# Patient Record
Sex: FEMALE | Race: WHITE | NOT HISPANIC OR LATINO | URBAN - METROPOLITAN AREA
[De-identification: names, ages, dates, MRNs, and addresses within clinical notes are randomized per-mention and may not be internally consistent; named-entity substitution may affect disease eponyms.]

---

## 2023-03-21 ENCOUNTER — INPATIENT (INPATIENT)
Facility: HOSPITAL | Age: 88
LOS: 3 days | Discharge: HOME | DRG: 871 | End: 2023-03-25
Attending: HOSPITALIST | Admitting: HOSPITALIST
Payer: MEDICARE

## 2023-03-21 VITALS
OXYGEN SATURATION: 97 % | HEART RATE: 100 BPM | TEMPERATURE: 96 F | DIASTOLIC BLOOD PRESSURE: 52 MMHG | RESPIRATION RATE: 20 BRPM | SYSTOLIC BLOOD PRESSURE: 91 MMHG

## 2023-03-21 DIAGNOSIS — J18.9 PNEUMONIA, UNSPECIFIED ORGANISM: ICD-10-CM

## 2023-03-21 LAB
ALBUMIN SERPL ELPH-MCNC: 2.7 G/DL — LOW (ref 3.5–5.2)
ALP SERPL-CCNC: 116 U/L — HIGH (ref 30–115)
ALT FLD-CCNC: <5 U/L — SIGNIFICANT CHANGE UP (ref 0–41)
ANION GAP SERPL CALC-SCNC: 15 MMOL/L — HIGH (ref 7–14)
AST SERPL-CCNC: 10 U/L — SIGNIFICANT CHANGE UP (ref 0–41)
BASOPHILS # BLD AUTO: 0.03 K/UL — SIGNIFICANT CHANGE UP (ref 0–0.2)
BASOPHILS NFR BLD AUTO: 0.2 % — SIGNIFICANT CHANGE UP (ref 0–1)
BILIRUB SERPL-MCNC: 0.2 MG/DL — SIGNIFICANT CHANGE UP (ref 0.2–1.2)
BUN SERPL-MCNC: 68 MG/DL — CRITICAL HIGH (ref 10–20)
CALCIUM SERPL-MCNC: 9.3 MG/DL — SIGNIFICANT CHANGE UP (ref 8.4–10.4)
CHLORIDE SERPL-SCNC: 89 MMOL/L — LOW (ref 98–110)
CO2 SERPL-SCNC: 21 MMOL/L — SIGNIFICANT CHANGE UP (ref 17–32)
CREAT SERPL-MCNC: 2.7 MG/DL — HIGH (ref 0.7–1.5)
EGFR: 16 ML/MIN/1.73M2 — LOW
EOSINOPHIL # BLD AUTO: 0.11 K/UL — SIGNIFICANT CHANGE UP (ref 0–0.7)
EOSINOPHIL NFR BLD AUTO: 0.8 % — SIGNIFICANT CHANGE UP (ref 0–8)
GLUCOSE SERPL-MCNC: 418 MG/DL — HIGH (ref 70–99)
HCT VFR BLD CALC: 31.5 % — LOW (ref 37–47)
HGB BLD-MCNC: 10.4 G/DL — LOW (ref 12–16)
IMM GRANULOCYTES NFR BLD AUTO: 1.1 % — HIGH (ref 0.1–0.3)
LACTATE SERPL-SCNC: 2.4 MMOL/L — HIGH (ref 0.7–2)
LIDOCAIN IGE QN: 17 U/L — SIGNIFICANT CHANGE UP (ref 7–60)
LYMPHOCYTES # BLD AUTO: 1.62 K/UL — SIGNIFICANT CHANGE UP (ref 1.2–3.4)
LYMPHOCYTES # BLD AUTO: 11.6 % — LOW (ref 20.5–51.1)
MCHC RBC-ENTMCNC: 29.3 PG — SIGNIFICANT CHANGE UP (ref 27–31)
MCHC RBC-ENTMCNC: 33 G/DL — SIGNIFICANT CHANGE UP (ref 32–37)
MCV RBC AUTO: 88.7 FL — SIGNIFICANT CHANGE UP (ref 81–99)
MONOCYTES # BLD AUTO: 0.56 K/UL — SIGNIFICANT CHANGE UP (ref 0.1–0.6)
MONOCYTES NFR BLD AUTO: 4 % — SIGNIFICANT CHANGE UP (ref 1.7–9.3)
NEUTROPHILS # BLD AUTO: 11.5 K/UL — HIGH (ref 1.4–6.5)
NEUTROPHILS NFR BLD AUTO: 82.3 % — HIGH (ref 42.2–75.2)
NRBC # BLD: 0 /100 WBCS — SIGNIFICANT CHANGE UP (ref 0–0)
PLATELET # BLD AUTO: 368 K/UL — SIGNIFICANT CHANGE UP (ref 130–400)
POTASSIUM SERPL-MCNC: 4.4 MMOL/L — SIGNIFICANT CHANGE UP (ref 3.5–5)
POTASSIUM SERPL-SCNC: 4.4 MMOL/L — SIGNIFICANT CHANGE UP (ref 3.5–5)
PROT SERPL-MCNC: 6.5 G/DL — SIGNIFICANT CHANGE UP (ref 6–8)
RBC # BLD: 3.55 M/UL — LOW (ref 4.2–5.4)
RBC # FLD: 15.9 % — HIGH (ref 11.5–14.5)
SODIUM SERPL-SCNC: 125 MMOL/L — LOW (ref 135–146)
TROPONIN T SERPL-MCNC: 0.04 NG/ML — CRITICAL HIGH
WBC # BLD: 13.97 K/UL — HIGH (ref 4.8–10.8)
WBC # FLD AUTO: 13.97 K/UL — HIGH (ref 4.8–10.8)

## 2023-03-21 PROCEDURE — 71045 X-RAY EXAM CHEST 1 VIEW: CPT | Mod: 26

## 2023-03-21 PROCEDURE — 87493 C DIFF AMPLIFIED PROBE: CPT

## 2023-03-21 PROCEDURE — 82962 GLUCOSE BLOOD TEST: CPT

## 2023-03-21 PROCEDURE — 83036 HEMOGLOBIN GLYCOSYLATED A1C: CPT

## 2023-03-21 PROCEDURE — 84484 ASSAY OF TROPONIN QUANT: CPT

## 2023-03-21 PROCEDURE — 99285 EMERGENCY DEPT VISIT HI MDM: CPT

## 2023-03-21 PROCEDURE — 97162 PT EVAL MOD COMPLEX 30 MIN: CPT | Mod: GP

## 2023-03-21 PROCEDURE — 93970 EXTREMITY STUDY: CPT

## 2023-03-21 PROCEDURE — 83550 IRON BINDING TEST: CPT

## 2023-03-21 PROCEDURE — 92610 EVALUATE SWALLOWING FUNCTION: CPT | Mod: GN

## 2023-03-21 PROCEDURE — 80048 BASIC METABOLIC PNL TOTAL CA: CPT

## 2023-03-21 PROCEDURE — 87040 BLOOD CULTURE FOR BACTERIA: CPT

## 2023-03-21 PROCEDURE — U0003: CPT

## 2023-03-21 PROCEDURE — 93010 ELECTROCARDIOGRAM REPORT: CPT

## 2023-03-21 PROCEDURE — 85027 COMPLETE CBC AUTOMATED: CPT

## 2023-03-21 PROCEDURE — 0225U NFCT DS DNA&RNA 21 SARSCOV2: CPT

## 2023-03-21 PROCEDURE — 84145 PROCALCITONIN (PCT): CPT

## 2023-03-21 PROCEDURE — 82607 VITAMIN B-12: CPT

## 2023-03-21 PROCEDURE — 83930 ASSAY OF BLOOD OSMOLALITY: CPT

## 2023-03-21 PROCEDURE — 83605 ASSAY OF LACTIC ACID: CPT

## 2023-03-21 PROCEDURE — 82728 ASSAY OF FERRITIN: CPT

## 2023-03-21 PROCEDURE — 81003 URINALYSIS AUTO W/O SCOPE: CPT

## 2023-03-21 PROCEDURE — 80053 COMPREHEN METABOLIC PANEL: CPT

## 2023-03-21 PROCEDURE — 87899 AGENT NOS ASSAY W/OPTIC: CPT

## 2023-03-21 PROCEDURE — 84540 ASSAY OF URINE/UREA-N: CPT

## 2023-03-21 PROCEDURE — 85025 COMPLETE CBC W/AUTO DIFF WBC: CPT

## 2023-03-21 PROCEDURE — 87507 IADNA-DNA/RNA PROBE TQ 12-25: CPT

## 2023-03-21 PROCEDURE — 87338 HPYLORI STOOL AG IA: CPT

## 2023-03-21 PROCEDURE — 76770 US EXAM ABDO BACK WALL COMP: CPT

## 2023-03-21 PROCEDURE — 84300 ASSAY OF URINE SODIUM: CPT

## 2023-03-21 PROCEDURE — 71045 X-RAY EXAM CHEST 1 VIEW: CPT

## 2023-03-21 PROCEDURE — 87449 NOS EACH ORGANISM AG IA: CPT

## 2023-03-21 PROCEDURE — 74176 CT ABD & PELVIS W/O CONTRAST: CPT | Mod: 26,MA

## 2023-03-21 PROCEDURE — 83935 ASSAY OF URINE OSMOLALITY: CPT

## 2023-03-21 PROCEDURE — 83735 ASSAY OF MAGNESIUM: CPT

## 2023-03-21 PROCEDURE — 83540 ASSAY OF IRON: CPT

## 2023-03-21 PROCEDURE — U0005: CPT

## 2023-03-21 PROCEDURE — 82570 ASSAY OF URINE CREATININE: CPT

## 2023-03-21 PROCEDURE — 84443 ASSAY THYROID STIM HORMONE: CPT

## 2023-03-21 PROCEDURE — 84156 ASSAY OF PROTEIN URINE: CPT

## 2023-03-21 PROCEDURE — 82010 KETONE BODYS QUAN: CPT

## 2023-03-21 PROCEDURE — 93005 ELECTROCARDIOGRAM TRACING: CPT

## 2023-03-21 PROCEDURE — 36415 COLL VENOUS BLD VENIPUNCTURE: CPT

## 2023-03-21 PROCEDURE — 94640 AIRWAY INHALATION TREATMENT: CPT

## 2023-03-21 PROCEDURE — 82746 ASSAY OF FOLIC ACID SERUM: CPT

## 2023-03-21 RX ORDER — FAMOTIDINE 10 MG/ML
20 INJECTION INTRAVENOUS ONCE
Refills: 0 | Status: COMPLETED | OUTPATIENT
Start: 2023-03-21 | End: 2023-03-21

## 2023-03-21 RX ORDER — DIPHENHYDRAMINE HYDROCHLORIDE AND LIDOCAINE HYDROCHLORIDE AND ALUMINUM HYDROXIDE AND MAGNESIUM HYDRO
30 KIT ONCE
Refills: 0 | Status: COMPLETED | OUTPATIENT
Start: 2023-03-21 | End: 2023-03-21

## 2023-03-21 RX ORDER — AZTREONAM 2 G
2000 VIAL (EA) INJECTION ONCE
Refills: 0 | Status: DISCONTINUED | OUTPATIENT
Start: 2023-03-21 | End: 2023-03-22

## 2023-03-21 RX ORDER — SODIUM CHLORIDE 9 MG/ML
1000 INJECTION INTRAMUSCULAR; INTRAVENOUS; SUBCUTANEOUS ONCE
Refills: 0 | Status: COMPLETED | OUTPATIENT
Start: 2023-03-21 | End: 2023-03-21

## 2023-03-21 RX ADMIN — FAMOTIDINE 20 MILLIGRAM(S): 10 INJECTION INTRAVENOUS at 19:45

## 2023-03-21 RX ADMIN — SODIUM CHLORIDE 1000 MILLILITER(S): 9 INJECTION INTRAMUSCULAR; INTRAVENOUS; SUBCUTANEOUS at 22:30

## 2023-03-21 NOTE — ED PROVIDER NOTE - CONSIDERATION OF ADMISSION OBSERVATION
Elderly frail female with elevated white cell count, findings of bilateral pneumonia, admit Consideration of Admission/Observation

## 2023-03-21 NOTE — ED PROVIDER NOTE - CLINICAL SUMMARY MEDICAL DECISION MAKING FREE TEXT BOX
89-year-old female with epigastric pain.  Labs ordered and reviewed, patient was found to have elevated white cell count as well as impaired renal function.  Troponin was slightly elevated, likely function of elevated creatinine.  EKG ordered and appears nonischemic.  Imaging including chest x-ray and CT abdomen pelvis were ordered, patient was found to have bilateral basilar infiltrates, antibiotics ordered, patient admitted for further management.

## 2023-03-21 NOTE — ED PROVIDER NOTE - OBJECTIVE STATEMENT
89 year old female with pmhx of ckd, dm, and peptic ulcers, presents to ed with epigastric abdominal pain. Pain is sharp, radiates upward. intermittent. pt denies sob, cough, fever, chills,  v/d, urinary symptoms, chest pain.

## 2023-03-21 NOTE — ED ADULT TRIAGE NOTE - CHIEF COMPLAINT QUOTE
BIBA from home, pt c/o epigastric abdominal pain, as per EMS, pt has been recently diagnosed with an ulcer. pt states the pain is worse after eating and she "burps a lot"

## 2023-03-21 NOTE — ED PROVIDER NOTE - NS ED ATTENDING STATEMENT MOD
This was a shared visit with the OSIEL. I reviewed and verified the documentation and independently performed the documented:

## 2023-03-21 NOTE — ED PROVIDER NOTE - ATTENDING APP SHARED VISIT CONTRIBUTION OF CARE
89-year-old female recently diagnosed with stomach ulcer presenting for evaluation of epigastric burning with eating.  Patient is a poor historian and very hard of hearing, most history obtained by from her daughter.  No vomiting, no chest pain, no shortness of breath, no leg pain or swelling.  Elderly female appears frail but in no acute distress, speaking full sentences, bilateral basilar inspiratory crackles, RRR, well-perfused extremities, abdomen is soft with mild epigastric tenderness without rebound or guarding, no leg edema, moving all extremities, awake and alert, follows directions appropriately.  Will treat pain, obtain labs, imaging, reassess.

## 2023-03-22 LAB
A1C WITH ESTIMATED AVERAGE GLUCOSE RESULT: 7.4 % — HIGH (ref 4–5.6)
ALBUMIN SERPL ELPH-MCNC: 2.7 G/DL — LOW (ref 3.5–5.2)
ALP SERPL-CCNC: 121 U/L — HIGH (ref 30–115)
ALT FLD-CCNC: <5 U/L — SIGNIFICANT CHANGE UP (ref 0–41)
ANION GAP SERPL CALC-SCNC: 13 MMOL/L — SIGNIFICANT CHANGE UP (ref 7–14)
ANION GAP SERPL CALC-SCNC: 16 MMOL/L — HIGH (ref 7–14)
AST SERPL-CCNC: 9 U/L — SIGNIFICANT CHANGE UP (ref 0–41)
B-OH-BUTYR SERPL-SCNC: <0.2 MMOL/L — SIGNIFICANT CHANGE UP
BASOPHILS # BLD AUTO: 0.05 K/UL — SIGNIFICANT CHANGE UP (ref 0–0.2)
BASOPHILS NFR BLD AUTO: 0.4 % — SIGNIFICANT CHANGE UP (ref 0–1)
BILIRUB SERPL-MCNC: <0.2 MG/DL — SIGNIFICANT CHANGE UP (ref 0.2–1.2)
BUN SERPL-MCNC: 64 MG/DL — CRITICAL HIGH (ref 10–20)
BUN SERPL-MCNC: 64 MG/DL — CRITICAL HIGH (ref 10–20)
CALCIUM SERPL-MCNC: 8.8 MG/DL — SIGNIFICANT CHANGE UP (ref 8.4–10.5)
CALCIUM SERPL-MCNC: 9.2 MG/DL — SIGNIFICANT CHANGE UP (ref 8.4–10.4)
CHLORIDE SERPL-SCNC: 96 MMOL/L — LOW (ref 98–110)
CHLORIDE SERPL-SCNC: 96 MMOL/L — LOW (ref 98–110)
CO2 SERPL-SCNC: 21 MMOL/L — SIGNIFICANT CHANGE UP (ref 17–32)
CO2 SERPL-SCNC: 23 MMOL/L — SIGNIFICANT CHANGE UP (ref 17–32)
CREAT SERPL-MCNC: 2.5 MG/DL — HIGH (ref 0.7–1.5)
CREAT SERPL-MCNC: 2.7 MG/DL — HIGH (ref 0.7–1.5)
EGFR: 16 ML/MIN/1.73M2 — LOW
EGFR: 18 ML/MIN/1.73M2 — LOW
EOSINOPHIL # BLD AUTO: 0.54 K/UL — SIGNIFICANT CHANGE UP (ref 0–0.7)
EOSINOPHIL NFR BLD AUTO: 4.3 % — SIGNIFICANT CHANGE UP (ref 0–8)
ESTIMATED AVERAGE GLUCOSE: 166 MG/DL — HIGH (ref 68–114)
FERRITIN SERPL-MCNC: 520 NG/ML — HIGH (ref 15–150)
FOLATE SERPL-MCNC: 4.7 NG/ML — SIGNIFICANT CHANGE UP
GLUCOSE BLDC GLUCOMTR-MCNC: 110 MG/DL — HIGH (ref 70–99)
GLUCOSE BLDC GLUCOMTR-MCNC: 144 MG/DL — HIGH (ref 70–99)
GLUCOSE BLDC GLUCOMTR-MCNC: 196 MG/DL — HIGH (ref 70–99)
GLUCOSE BLDC GLUCOMTR-MCNC: 291 MG/DL — HIGH (ref 70–99)
GLUCOSE BLDC GLUCOMTR-MCNC: 88 MG/DL — SIGNIFICANT CHANGE UP (ref 70–99)
GLUCOSE SERPL-MCNC: 157 MG/DL — HIGH (ref 70–99)
GLUCOSE SERPL-MCNC: 56 MG/DL — LOW (ref 70–99)
HCT VFR BLD CALC: 29.6 % — LOW (ref 37–47)
HGB BLD-MCNC: 9.4 G/DL — LOW (ref 12–16)
IMM GRANULOCYTES NFR BLD AUTO: 1.2 % — HIGH (ref 0.1–0.3)
IRON SATN MFR SERPL: 21 % — SIGNIFICANT CHANGE UP (ref 15–50)
IRON SATN MFR SERPL: 31 UG/DL — LOW (ref 35–150)
LACTATE SERPL-SCNC: 1.5 MMOL/L — SIGNIFICANT CHANGE UP (ref 0.7–2)
LACTATE SERPL-SCNC: 1.6 MMOL/L — SIGNIFICANT CHANGE UP (ref 0.7–2)
LACTATE SERPL-SCNC: 1.8 MMOL/L — SIGNIFICANT CHANGE UP (ref 0.7–2)
LACTATE SERPL-SCNC: 2.2 MMOL/L — HIGH (ref 0.7–2)
LYMPHOCYTES # BLD AUTO: 16.2 % — LOW (ref 20.5–51.1)
LYMPHOCYTES # BLD AUTO: 2.06 K/UL — SIGNIFICANT CHANGE UP (ref 1.2–3.4)
MAGNESIUM SERPL-MCNC: 1.4 MG/DL — LOW (ref 1.8–2.4)
MCHC RBC-ENTMCNC: 28.4 PG — SIGNIFICANT CHANGE UP (ref 27–31)
MCHC RBC-ENTMCNC: 31.8 G/DL — LOW (ref 32–37)
MCV RBC AUTO: 89.4 FL — SIGNIFICANT CHANGE UP (ref 81–99)
MONOCYTES # BLD AUTO: 0.62 K/UL — HIGH (ref 0.1–0.6)
MONOCYTES NFR BLD AUTO: 4.9 % — SIGNIFICANT CHANGE UP (ref 1.7–9.3)
NEUTROPHILS # BLD AUTO: 9.26 K/UL — HIGH (ref 1.4–6.5)
NEUTROPHILS NFR BLD AUTO: 73 % — SIGNIFICANT CHANGE UP (ref 42.2–75.2)
NRBC # BLD: 0 /100 WBCS — SIGNIFICANT CHANGE UP (ref 0–0)
OSMOLALITY SERPL: 300 MOS/KG — SIGNIFICANT CHANGE UP (ref 280–301)
PLATELET # BLD AUTO: 394 K/UL — SIGNIFICANT CHANGE UP (ref 130–400)
POTASSIUM SERPL-MCNC: 3.9 MMOL/L — SIGNIFICANT CHANGE UP (ref 3.5–5)
POTASSIUM SERPL-MCNC: 4 MMOL/L — SIGNIFICANT CHANGE UP (ref 3.5–5)
POTASSIUM SERPL-SCNC: 3.9 MMOL/L — SIGNIFICANT CHANGE UP (ref 3.5–5)
POTASSIUM SERPL-SCNC: 4 MMOL/L — SIGNIFICANT CHANGE UP (ref 3.5–5)
PROCALCITONIN SERPL-MCNC: 0.25 NG/ML — HIGH (ref 0.02–0.1)
PROT SERPL-MCNC: 6.2 G/DL — SIGNIFICANT CHANGE UP (ref 6–8)
RBC # BLD: 3.31 M/UL — LOW (ref 4.2–5.4)
RBC # FLD: 15.9 % — HIGH (ref 11.5–14.5)
SARS-COV-2 RNA SPEC QL NAA+PROBE: SIGNIFICANT CHANGE UP
SODIUM SERPL-SCNC: 132 MMOL/L — LOW (ref 135–146)
SODIUM SERPL-SCNC: 133 MMOL/L — LOW (ref 135–146)
TIBC SERPL-MCNC: 145 UG/DL — LOW (ref 220–430)
TROPONIN T SERPL-MCNC: 0.03 NG/ML — CRITICAL HIGH
TROPONIN T SERPL-MCNC: 0.04 NG/ML — CRITICAL HIGH
TSH SERPL-MCNC: 1.23 UIU/ML — SIGNIFICANT CHANGE UP (ref 0.27–4.2)
UIBC SERPL-MCNC: 114 UG/DL — SIGNIFICANT CHANGE UP (ref 110–370)
VIT B12 SERPL-MCNC: 1216 PG/ML — SIGNIFICANT CHANGE UP (ref 232–1245)
WBC # BLD: 12.68 K/UL — HIGH (ref 4.8–10.8)
WBC # FLD AUTO: 12.68 K/UL — HIGH (ref 4.8–10.8)

## 2023-03-22 PROCEDURE — 76770 US EXAM ABDO BACK WALL COMP: CPT | Mod: 26

## 2023-03-22 PROCEDURE — 99223 1ST HOSP IP/OBS HIGH 75: CPT

## 2023-03-22 RX ORDER — DEXTROSE 50 % IN WATER 50 %
25 SYRINGE (ML) INTRAVENOUS ONCE
Refills: 0 | Status: DISCONTINUED | OUTPATIENT
Start: 2023-03-22 | End: 2023-03-24

## 2023-03-22 RX ORDER — ATORVASTATIN CALCIUM 80 MG/1
20 TABLET, FILM COATED ORAL AT BEDTIME
Refills: 0 | Status: DISCONTINUED | OUTPATIENT
Start: 2023-03-22 | End: 2023-03-25

## 2023-03-22 RX ORDER — SODIUM CHLORIDE 9 MG/ML
1000 INJECTION, SOLUTION INTRAVENOUS
Refills: 0 | Status: DISCONTINUED | OUTPATIENT
Start: 2023-03-22 | End: 2023-03-24

## 2023-03-22 RX ORDER — LACTOBACILLUS ACIDOPHILUS 100MM CELL
1 CAPSULE ORAL DAILY
Refills: 0 | Status: DISCONTINUED | OUTPATIENT
Start: 2023-03-22 | End: 2023-03-25

## 2023-03-22 RX ORDER — SODIUM CHLORIDE 9 MG/ML
500 INJECTION, SOLUTION INTRAVENOUS
Refills: 0 | Status: COMPLETED | OUTPATIENT
Start: 2023-03-22 | End: 2023-03-22

## 2023-03-22 RX ORDER — PANTOPRAZOLE SODIUM 20 MG/1
40 TABLET, DELAYED RELEASE ORAL
Refills: 0 | Status: DISCONTINUED | OUTPATIENT
Start: 2023-03-22 | End: 2023-03-25

## 2023-03-22 RX ORDER — SUCRALFATE 1 G
1 TABLET ORAL EVERY 6 HOURS
Refills: 0 | Status: DISCONTINUED | OUTPATIENT
Start: 2023-03-22 | End: 2023-03-25

## 2023-03-22 RX ORDER — INSULIN GLARGINE 100 [IU]/ML
8 INJECTION, SOLUTION SUBCUTANEOUS ONCE
Refills: 0 | Status: COMPLETED | OUTPATIENT
Start: 2023-03-22 | End: 2023-03-22

## 2023-03-22 RX ORDER — SODIUM CHLORIDE 9 MG/ML
250 INJECTION, SOLUTION INTRAVENOUS ONCE
Refills: 0 | Status: COMPLETED | OUTPATIENT
Start: 2023-03-22 | End: 2023-03-22

## 2023-03-22 RX ORDER — METRONIDAZOLE 500 MG
500 TABLET ORAL EVERY 8 HOURS
Refills: 0 | Status: DISCONTINUED | OUTPATIENT
Start: 2023-03-22 | End: 2023-03-23

## 2023-03-22 RX ORDER — GLUCAGON INJECTION, SOLUTION 0.5 MG/.1ML
1 INJECTION, SOLUTION SUBCUTANEOUS ONCE
Refills: 0 | Status: DISCONTINUED | OUTPATIENT
Start: 2023-03-22 | End: 2023-03-24

## 2023-03-22 RX ORDER — INFLUENZA VIRUS VACCINE 15; 15; 15; 15 UG/.5ML; UG/.5ML; UG/.5ML; UG/.5ML
0.7 SUSPENSION INTRAMUSCULAR ONCE
Refills: 0 | Status: DISCONTINUED | OUTPATIENT
Start: 2023-03-22 | End: 2023-03-25

## 2023-03-22 RX ORDER — POLYETHYLENE GLYCOL 3350 17 G/17G
17 POWDER, FOR SOLUTION ORAL DAILY
Refills: 0 | Status: DISCONTINUED | OUTPATIENT
Start: 2023-03-22 | End: 2023-03-25

## 2023-03-22 RX ORDER — DEXTROSE 50 % IN WATER 50 %
15 SYRINGE (ML) INTRAVENOUS ONCE
Refills: 0 | Status: DISCONTINUED | OUTPATIENT
Start: 2023-03-22 | End: 2023-03-24

## 2023-03-22 RX ORDER — SODIUM CHLORIDE 9 MG/ML
1000 INJECTION INTRAMUSCULAR; INTRAVENOUS; SUBCUTANEOUS
Refills: 0 | Status: DISCONTINUED | OUTPATIENT
Start: 2023-03-22 | End: 2023-03-23

## 2023-03-22 RX ORDER — SODIUM CHLORIDE 9 MG/ML
1000 INJECTION INTRAMUSCULAR; INTRAVENOUS; SUBCUTANEOUS
Refills: 0 | Status: DISCONTINUED | OUTPATIENT
Start: 2023-03-22 | End: 2023-03-22

## 2023-03-22 RX ORDER — DEXTROSE 50 % IN WATER 50 %
12.5 SYRINGE (ML) INTRAVENOUS ONCE
Refills: 0 | Status: DISCONTINUED | OUTPATIENT
Start: 2023-03-22 | End: 2023-03-24

## 2023-03-22 RX ORDER — INSULIN LISPRO 100/ML
8 VIAL (ML) SUBCUTANEOUS ONCE
Refills: 0 | Status: COMPLETED | OUTPATIENT
Start: 2023-03-22 | End: 2023-03-22

## 2023-03-22 RX ORDER — HEPARIN SODIUM 5000 [USP'U]/ML
5000 INJECTION INTRAVENOUS; SUBCUTANEOUS EVERY 12 HOURS
Refills: 0 | Status: DISCONTINUED | OUTPATIENT
Start: 2023-03-22 | End: 2023-03-25

## 2023-03-22 RX ORDER — VANCOMYCIN HCL 1 G
750 VIAL (EA) INTRAVENOUS ONCE
Refills: 0 | Status: COMPLETED | OUTPATIENT
Start: 2023-03-22 | End: 2023-03-22

## 2023-03-22 RX ORDER — INSULIN LISPRO 100/ML
VIAL (ML) SUBCUTANEOUS
Refills: 0 | Status: DISCONTINUED | OUTPATIENT
Start: 2023-03-22 | End: 2023-03-24

## 2023-03-22 RX ADMIN — Medication 100 MILLIGRAM(S): at 21:34

## 2023-03-22 RX ADMIN — SODIUM CHLORIDE 50 MILLILITER(S): 9 INJECTION INTRAMUSCULAR; INTRAVENOUS; SUBCUTANEOUS at 14:22

## 2023-03-22 RX ADMIN — INSULIN GLARGINE 8 UNIT(S): 100 INJECTION, SOLUTION SUBCUTANEOUS at 05:22

## 2023-03-22 RX ADMIN — HEPARIN SODIUM 5000 UNIT(S): 5000 INJECTION INTRAVENOUS; SUBCUTANEOUS at 05:21

## 2023-03-22 RX ADMIN — Medication 8 UNIT(S): at 05:22

## 2023-03-22 RX ADMIN — PANTOPRAZOLE SODIUM 40 MILLIGRAM(S): 20 TABLET, DELAYED RELEASE ORAL at 05:23

## 2023-03-22 RX ADMIN — PANTOPRAZOLE SODIUM 40 MILLIGRAM(S): 20 TABLET, DELAYED RELEASE ORAL at 17:19

## 2023-03-22 RX ADMIN — SODIUM CHLORIDE 75 MILLILITER(S): 9 INJECTION INTRAMUSCULAR; INTRAVENOUS; SUBCUTANEOUS at 05:24

## 2023-03-22 RX ADMIN — Medication 1 TABLET(S): at 11:22

## 2023-03-22 RX ADMIN — ATORVASTATIN CALCIUM 20 MILLIGRAM(S): 80 TABLET, FILM COATED ORAL at 21:34

## 2023-03-22 RX ADMIN — Medication 250 MILLIGRAM(S): at 06:40

## 2023-03-22 RX ADMIN — SODIUM CHLORIDE 250 MILLILITER(S): 9 INJECTION, SOLUTION INTRAVENOUS at 17:06

## 2023-03-22 RX ADMIN — Medication 100 MILLIGRAM(S): at 05:22

## 2023-03-22 RX ADMIN — HEPARIN SODIUM 5000 UNIT(S): 5000 INJECTION INTRAVENOUS; SUBCUTANEOUS at 17:19

## 2023-03-22 RX ADMIN — Medication 100 MILLIGRAM(S): at 14:25

## 2023-03-22 RX ADMIN — Medication 1 GRAM(S): at 17:19

## 2023-03-22 RX ADMIN — Medication 1 GRAM(S): at 05:23

## 2023-03-22 RX ADMIN — SODIUM CHLORIDE 250 MILLILITER(S): 9 INJECTION, SOLUTION INTRAVENOUS at 10:34

## 2023-03-22 RX ADMIN — POLYETHYLENE GLYCOL 3350 17 GRAM(S): 17 POWDER, FOR SOLUTION ORAL at 11:22

## 2023-03-22 RX ADMIN — Medication 1 GRAM(S): at 11:23

## 2023-03-22 NOTE — PATIENT PROFILE ADULT - FALL HARM RISK - HARM RISK INTERVENTIONS

## 2023-03-22 NOTE — CONSULT NOTE ADULT - ASSESSMENT
ASSESSMENT  89y Female with a PMH of CKD, DM, HTN presented with abdominal pain, positive CXR admitted for aspiration PNA     IMPRESSION  #Severe sepsis on admission T<36 WBC 13 lactic acidosis currently on 4L NC sat 94%   #B/l lower lobes consolidation on CT   #Recent admission for PNA last month in [place]     #CKD  #DM  #HTN  #Torres Martinez      RECOMMENDATIONS  - C/w Levaquin for a total of 7 days, can be transitioned to PO if discharged earlier   - D/c Flagyl   - SLP evaluation completed   - Sputum and blood clx

## 2023-03-22 NOTE — PROGRESS NOTE ADULT - SUBJECTIVE AND OBJECTIVE BOX
RAUL LYNN  89[facility]      Patient is a 89y old  Female who presents with a chief complaint of pneumonia (22 Mar 2023 02:08)      INTERVAL HPI/OVERNIGHT EVENTS:        REVIEW OF SYSTEMS:        FAMILY HISTORY:    T(C): 36.7 (03-22-23 @ 07:35), Max: 37.2 (03-22-23 @ 00:10)  HR: 98 (03-22-23 @ 07:35) (98 - 104)  BP: 104/57 (03-22-23 @ 07:35) (91/52 - 104/57)  RR: 20 (03-22-23 @ 07:35) (18 - 20)  SpO2: 98% (03-22-23 @ 07:35) (96% - 98%)  Wt(kg): --Vital Signs Last 24 Hrs  T(C): 36.7 (22 Mar 2023 07:35), Max: 37.2 (22 Mar 2023 00:10)  T(F): 98 (22 Mar 2023 07:35), Max: 99 (22 Mar 2023 00:10)  HR: 98 (22 Mar 2023 07:35) (98 - 104)  BP: 104/57 (22 Mar 2023 07:35) (91/52 - 104/57)  BP(mean): 77 (21 Mar 2023 23:54) (77 - 77)  RR: 20 (22 Mar 2023 07:35) (18 - 20)  SpO2: 98% (22 Mar 2023 07:35) (96% - 98%)    Parameters below as of 22 Mar 2023 07:35  Patient On (Oxygen Delivery Method): nasal cannula        PHYSICAL EXAM:  GENERAL: NAD, well-groomed, well-developed  HEAD:  Atraumatic, Normocephalic  EYES: EOMI, PERRLA, conjunctiva and sclera clear  ENMT: No tonsillar erythema, exudates, or enlargement; Moist mucous membranes, Good dentition, No lesions  NECK: Supple, No JVD, Normal thyroid  NERVOUS SYSTEM:  Alert & Oriented X3, Good concentration; Motor Strength 5/5 B/L upper and lower extremities; DTRs 2+ intact and symmetric  PULM: Clear to auscultation bilaterally  CARDIAC: Regular rate and rhythm; No murmurs, rubs, or gallops  GI: Soft, Nontender, Nondistended; Bowel sounds present  EXTREMITIES:  2+ Peripheral Pulses, No clubbing, cyanosis, or edema  LYMPH: No lymphadenopathy noted  SKIN: No rashes or lesions    Consultant(s) Notes Reviewed:  [x ] YES  [ ] NO  Care Discussed with Consultants/Other Providers [ x] YES  [ ] NO    LABS:                            10.4   13.97 )-----------( 368      ( 21 Mar 2023 19:31 )             31.5   03-21    125<L>  |  89<L>  |  68<HH>  ----------------------------<  418<H>  4.4   |  21  |  2.7<H>    Ca    9.3      21 Mar 2023 19:31    TPro  6.5  /  Alb  2.7<L>  /  TBili  0.2  /  DBili  x   /  AST  10  /  ALT  <5  /  AlkPhos  116<H>  03-21            atorvastatin 20 milliGRAM(s) Oral at bedtime  dextrose 5%. 1000 milliLiter(s) IV Continuous <Continuous>  dextrose 5%. 1000 milliLiter(s) IV Continuous <Continuous>  dextrose 50% Injectable 25 Gram(s) IV Push once  dextrose 50% Injectable 12.5 Gram(s) IV Push once  dextrose 50% Injectable 25 Gram(s) IV Push once  dextrose Oral Gel 15 Gram(s) Oral once PRN  glucagon  Injectable 1 milliGRAM(s) IntraMuscular once  heparin   Injectable 5000 Unit(s) SubCutaneous every 12 hours  insulin lispro (ADMELOG) corrective regimen sliding scale   SubCutaneous three times a day before meals  levoFLOXacin IVPB 500 milliGRAM(s) IV Intermittent every 48 hours  metroNIDAZOLE  IVPB 500 milliGRAM(s) IV Intermittent every 8 hours  pantoprazole    Tablet 40 milliGRAM(s) Oral two times a day  sodium chloride 0.9%. 1000 milliLiter(s) IV Continuous <Continuous>  sucralfate 1 Gram(s) Oral every 6 hours      89 year old female withPMHx of HTN, DM, CKD presents to ed with epigastric abdominal pain. Pt is [place] and limited historian, further hx was obtained from daughter Chanelle. Per daughter, pt recently moved to [place] from [place] 2 months ago and was admitted to hospital in [place] last month with pneumonia and discharged to rehab where she was complaining of burning stomach pain and was discharged on Protonix (endoscopy was denied because of her age) Pt had a nephrologist in [place] per daughter but does not remember the patient's baseline kidney function. Denies chest pain, nausea, vomiting, SOB.    1. Sepsis (leukocytosis, hypothermia) secondary to Aspiration pneumonia complicated by demand ischemia   - Admit  to medicine     - ECG:WNL      - LA:2.4. Repeat LA:pending              - CXR: bilateral opacities          - cont levofloxacin and metronidazole (hx of PCN allergy)   - Bolus in the ER with 1 liter followed by 75ml/hr ( Give 250ml bolus *1)   - Blood cx:pending         - Sputum cx:pending         - Urine leg:pending        - Urine strep:pending         -RVP:pending   - CT A/P showed Bilateral lower lobe consolidation, occlusion of peripheral lower lobe bronchi suggestive of aspiration  - NPO  - cyclic cardiac enzymes detectable.   - Swallowing eval:pending     2. Dyspepsia  - was taking Protonix 40mg daily at home with no response  - increased to Protonix 40mg BID PO here  - endoscopy was not performed before because of her advanced age  - GI f/u OP  - consult GI inpatient if HgB drops acutely        3. CHRIS Vs progressive CKD  - Cr 2.7 (unknown baseline)  - F/u urine lytes  - c/w IVF NS @75cc/hr  - Monitor Cr daily      4. acute vs chronic  hyponatremia seems related to dehydration and hyperglycemia   - TSH:pending   - Na+ 125  - Urine Na/Urine Osm/Serum Osm   - Cont IVF     5. Normocytic Anemia - 2/2 CKD?  - HgB 10.4 (unknown baseline)  - MCV 88  - F/u iron studies, b12, folate    6. HTN  - takes amlodipine 2.5 mg daily, HCTZ 12.5mg daily and lisinopril 10mg daily  - hold anti-HTNsive meds for now as BP is soft and restart as needed    7. DM  - takes pioglitazone 15mg daily  - serum glucose 418; AG 15  - STAT lispro 8U  - F/u BHB and repeat AG  - c/w insulin lantus 8U and sliding scale    DVT px - heparin  Diet - NPO  GI px - protonix  Dispo - floor  CODE STATUS - DNR/DNI  *MED REC - confirmed meds with daughter Chanelle          Case Discussed with House Staff   39  minutes spent on total encounter; more than 50% of the visit was spent counseling and/or coordinating care by the attending physician.   Spectra [phone]     RAUL LYNN  89y  [facility]-N ED Hold 028 A      Patient is a 89y old  Female who presents with a chief complaint of pneumonia (22 Mar 2023 02:08)      INTERVAL HPI/OVERNIGHT EVENTS:  Patient looks comfortable  daughter at the bedside. she reported that patient has been experiencing recurrent pneumonia over the last 3 months.   it looks related to aspiration.             FAMILY HISTORY:    T(C): 36.7 (03-22-23 @ 07:35), Max: 37.2 (03-22-23 @ 00:10)  HR: 98 (03-22-23 @ 07:35) (98 - 104)  BP: 104/57 (03-22-23 @ 07:35) (91/52 - 104/57)  RR: 20 (03-22-23 @ 07:35) (18 - 20)  SpO2: 98% (03-22-23 @ 07:35) (96% - 98%)  Wt(kg): --Vital Signs Last 24 Hrs  T(C): 36.7 (22 Mar 2023 07:35), Max: 37.2 (22 Mar 2023 00:10)  T(F): 98 (22 Mar 2023 07:35), Max: 99 (22 Mar 2023 00:10)  HR: 98 (22 Mar 2023 07:35) (98 - 104)  BP: 104/57 (22 Mar 2023 07:35) (91/52 - 104/57)  BP(mean): 77 (21 Mar 2023 23:54) (77 - 77)  RR: 20 (22 Mar 2023 07:35) (18 - 20)  SpO2: 98% (22 Mar 2023 07:35) (96% - 98%)    Parameters below as of 22 Mar 2023 07:35  Patient On (Oxygen Delivery Method): nasal cannula        PHYSICAL EXAM:  GENERAL: NAD, well-groomed, well-developed  NERVOUS SYSTEM:  Alert & Oriented X3,   PULM: Crackles   Heart: Regular rate and rhythm;  GI: Soft, Nontender, Nondistended; Bowel sounds present  EXTREMITIES:  2+ Peripheral Pulses,    Consultant(s) Notes Reviewed:  [x ] YES  [ ] NO  Care Discussed with Consultants/Other Providers [ x] YES  [ ] NO    LABS:                            10.4   13.97 )-----------( 368      ( 21 Mar 2023 19:31 )             31.5   03-21    125<L>  |  89<L>  |  68<HH>  ----------------------------<  418<H>  4.4   |  21  |  2.7<H>    Ca    9.3      21 Mar 2023 19:31    TPro  6.5  /  Alb  2.7<L>  /  TBili  0.2  /  DBili  x   /  AST  10  /  ALT  <5  /  AlkPhos  116<H>  03-21            atorvastatin 20 milliGRAM(s) Oral at bedtime  dextrose 5%. 1000 milliLiter(s) IV Continuous <Continuous>  dextrose 5%. 1000 milliLiter(s) IV Continuous <Continuous>  dextrose 50% Injectable 25 Gram(s) IV Push once  dextrose 50% Injectable 12.5 Gram(s) IV Push once  dextrose 50% Injectable 25 Gram(s) IV Push once  dextrose Oral Gel 15 Gram(s) Oral once PRN  glucagon  Injectable 1 milliGRAM(s) IntraMuscular once  heparin   Injectable 5000 Unit(s) SubCutaneous every 12 hours  insulin lispro (ADMELOG) corrective regimen sliding scale   SubCutaneous three times a day before meals  levoFLOXacin IVPB 500 milliGRAM(s) IV Intermittent every 48 hours  metroNIDAZOLE  IVPB 500 milliGRAM(s) IV Intermittent every 8 hours  pantoprazole    Tablet 40 milliGRAM(s) Oral two times a day  sodium chloride 0.9%. 1000 milliLiter(s) IV Continuous <Continuous>  sucralfate 1 Gram(s) Oral every 6 hours      89 year old female withPMHx of HTN, DM, CKD presents to ed with epigastric abdominal pain. Pt is [place] and limited historian, further hx was obtained from daughter Chanelle. Per daughter, pt recently moved to [place] from [place] 2 months ago and was admitted to hospital in [place] last month with pneumonia and discharged to rehab where she was complaining of burning stomach pain and was discharged on Protonix (endoscopy was denied because of her age) Pt had a nephrologist in [place] per daughter but does not remember the patient's baseline kidney function. Denies chest pain, nausea, vomiting, SOB.    1. Sepsis (leukocytosis, hypothermia) secondary to Aspiration pneumonia complicated by demand ischemia   - Admit  to medicine     - ECG:WNL      - LA:2.4. Repeat LA:1.5              - CXR: bilateral opacities          - cont levofloxacin and metronidazole (hx of PCN allergy)   - Bolus in the ER with 1 liter followed by 50/hr ( Give 250ml bolus *1)   - Blood cx:pending         - Sputum cx:pending         - Urine leg:pending        - Urine strep:pending         -RVP:pending   - CT A/P showed Bilateral lower lobe consolidation, occlusion of peripheral lower lobe bronchi suggestive of aspiration  - NPO  - cyclic cardiac enzymes detectable.   - Swallowing eval:pending   - PT eval:pending     2. Dyspepsia  - was taking Protonix 40mg daily at home with no response  - increased to Protonix 40mg BID PO here  - endoscopy was not performed before because of her advanced age  - GI f/u OP  - consult GI inpatient if HgB drops acutely      3. CHRIS Vs progressive CKD  - Cr 2.7 (unknown baseline)  - F/u urine lytes  - Cont IVF at lower rate 50ml/hr for 10 hours.   - Monitor Cr daily      4. acute yponatremia seems related to dehydration and hyperglycemia   * daughter reported decrease in solid intake and increase in tea uptake.   * corrected hyponatremia for hyperglycemia is close to 130-132.   - TSH:pending   - Urine Na/Urine Osm/Serum Osm   - Cont IVF     5. Normocytic Anemia - 2/2 CKD?  - HgB 10.4 (unknown baseline)  - MCV 88  - F/u iron studies, b12, folate    6. HTN  - takes amlodipine 2.5 mg daily, HCTZ 12.5mg daily and lisinopril 10mg daily  - hold anti-HTNsive meds for now as BP is soft and restart as needed    7. DM  - takes pioglitazone 15mg daily  - serum glucose 418; AG 15  - c/w insulin lantus 8U and sliding scale  - Hgba1c: 7.4    DVT px - heparin  Diet - NPO  GI px - protonix  Dispo - floor  CODE STATUS - DNR/DNI  *MED REC - confirmed meds with daughter Chanelle

## 2023-03-22 NOTE — H&P ADULT - ASSESSMENT
89 year old female withPMHx of HTN, DM, CKD presents to ed with epigastric abdominal pain. Pt is [place] and limited historian, further hx was obtained from daughter Chanelle. Per daughter, pt recently moved to [place] from [place] 2 months ago and was admitted to hospital in [place] last month with pneumonia and discharged to rehab where she was complaining of burning stomach pain and was discharged on Protonix (endoscopy was denied because of her age) Pt had a nephrologist in [place] per daughter but does not remember the patient's baseline kidney function. Denies chest pain, nausea, vomiting, SOB.    #Sepsis 2/2 aspiration pna  - WBC 13K, Tmin 95.6; on RA  - Lactate 2.4  - CXR shows b/l patchy opacities (f/u official read)  - CT A/P showed Bilateral lower lobe consolidation, occlusion of peripheral lower lobe bronchi suggestive of aspiration  - F/u procal, MRSA, RVP panel, Strep ur Ag, Legionella Ur Ag  - F/u BCx, sputum Cx  - ceftriaxone 1g iv daily and flagyl 500mg iv TID  - NPO  - SpSw eval  - c/w IVF NS @75cc/hr  - Trend lactate    #Dyspepsia  - was taking Protonix 40mg daily at home with no response  - increased to Protonix 40mg BID PO here  - endoscopy was not performed before because of her advanced age  - GI f/u OP  - consult GI inpatient if HgB drops acutely    #CHRIS Vs progressive CKD  - Cr 2.7 (unknown baseline)  - F/u urine lytes  - c/w IVF NS @75cc/hr  - F/u RBUS  - Monitor Cr daily    #Troponinemia - likely 2/2 kidney dysfunction  - trops 0.04  - no chest pain  - EKG showed no ischemic changes  - trend trops    #Hypertonic hyponatremia  - Na+ 125  - asymptomatic  - calculated serum osm: 298  - likely 2/2 hyperglycemia  - F/u urine Na, serum osm, urine osm    #Normocytic Anemia - 2/2 CKD?  - HgB 10.4 (unknown baseline)  - MCV 88  - F/u iron studies, b12, folate    #HTN  - takes amlodipine 2.5 mg daily, HCTZ 12.5mg daily and lisinopril 10mg daily  - hold anti-HTNsive meds for now as BP is soft and restart as needed    #DM  - takes pioglitazone 15mg daily  - serum glucose 418; AG 15  - STAT lispro 8U  - F/u BHB and repeat AG  - c/w insulin lantus 8U and sliding scale    DVT px - heparin  Diet - NPO  GI px - protonix  Dispo - floor  CODE STATUS - DNR/DNI  *MED REC - confirmed meds with daughter Chanelle 89 year old female withPMHx of HTN, DM, CKD presents to ed with epigastric abdominal pain. [place] and limited historian, further hx was obtained from daughter Chanelle. Per daughter, pt recently moved to [place] from [place] 2 months ago and was admitted to hospital in [place] last month with pneumonia and discharged to rehab where she was complaining of burning stomach pain and was discharged on Protonix (endoscopy was denied because of her age) Pt had a nephrologist in [place] per daughter but does not remember the patient's baseline kidney function. Denies chest pain, nausea, vomiting, SOB.    #Sepsis 2/2 aspiration pna  - WBC 13K, Tmin 95.6; on RA  - Lactate 2.4  - CXR shows b/l patchy opacities (f/u official read)  - CT A/P showed Bilateral lower lobe consolidation, occlusion of peripheral lower lobe bronchi suggestive of aspiration  - F/u procal, RVP panel, Strep ur Ag, Legionella Ur Ag  - F/u BCx, sputum Cx  - levofloxacin 500mg iv daily and flagyl 500mg iv TID  - vancomycin 750mg iv once and F/u MRSA nares  - ID eval  - NPO  - SpSw eval  - c/w IVF NS @75cc/hr  - Trend lactate    #Dyspepsia  - was taking Protonix 40mg daily at home with no response  - increased to Protonix 40mg BID PO here  - endoscopy was not performed before because of her advanced age  - GI f/u OP  - consult GI inpatient if HgB drops acutely    #CHRIS Vs progressive CKD  - Cr 2.7 (unknown baseline)  - F/u urine lytes  - c/w IVF NS @75cc/hr  - F/u RBUS  - Monitor Cr daily    #Troponinemia - likely 2/2 kidney dysfunction  - trops 0.04  - no chest pain  - EKG showed no ischemic changes  - trend trops    #Hypertonic hyponatremia  - Na+ 125  - asymptomatic  - calculated serum osm: 298  - likely 2/2 hyperglycemia  - F/u urine Na, serum osm, urine osm    #Normocytic Anemia - 2/2 CKD?  - HgB 10.4 (unknown baseline)  - MCV 88  - F/u iron studies, b12, folate    #HTN  - takes amlodipine 2.5 mg daily, HCTZ 12.5mg daily and lisinopril 10mg daily  - hold anti-HTNsive meds for now as BP is soft and restart as needed    #DM  - takes pioglitazone 15mg daily  - serum glucose 418; AG 15  - STAT lispro 8U  - F/u BHB and repeat AG  - c/w insulin lantus 8U and sliding scale    DVT px - heparin  Diet - NPO  GI px - protonix  Dispo - floor  CODE STATUS - DNR/DNI  *MED REC - confirmed meds with daughter Chanelle

## 2023-03-22 NOTE — PATIENT PROFILE ADULT - FALL HARM RISK - FALLEN IN PAST
ENT HPI





- General


Chief complaint: ENT


Stated complaint: sore throat/headache


Time Seen by Provider: 11/12/18 13:18


Source: patient


Mode of arrival: ambulatory


Limitations: no limitations





- History of Present Illness


Initial comments: 


29-year-old male with PMH of HTN presenting today for headache on-and-off for 

past 3 days.  Patient states that he has had a headache every time he has sex 

for the past 2 days, he states is sharp shooting pain located to the posterior 

aspect of his head.  Patient denies any visual changes, nausea, vomiting, 

dizziness, upper or lower extremity weakness or paresthesias, facial asymmetry, 

ataxia, speech changes or any other symptoms at the time.  Patient states that 

when he gets this headache feels anxious, and throat discomfort.  Patient 

denies any throat pain, pain with swallowing, tongue or neck swelling.  Patient 

denies any ALLERGIES to any medications or rashes, dizziness, confusion, fever, 

chills, night sweats, neck masses, cough, congestion, sinus pressure.  Patient 

states that he has no current symptoms, but this is happened about 3-4 times 

total in the past 2-3 times, no episodes today.  Remainder was negative, 

patient denies any recent shortness of breath, chest pain, back pain, abdominal 

pain, nausea or vomiting, numbness or tingling, dysuria or hematuria, 

constipation or diarrhea,  or any other complaints. Upon arrival pt appears 

well no signs of acute distress. VS within acceptable limits.








- Related Data


 Allergies











Allergy/AdvReac Type Severity Reaction Status Date / Time


 


No Known Allergies Allergy   Verified 11/12/18 12:34














Review of Systems


ROS Statement: 


Those systems with pertinent positive or pertinent negative responses have been 

documented in the HPI.





ROS Other: All systems not noted in ROS Statement are negative.


Constitutional: Denies: fever, night sweats


ENT: Reports: as per HPI (throat discomfort).  Denies: ear pain


Respiratory: Denies: cough, dyspnea, wheezes, hemoptysis, stridor


Cardiovascular: Denies: chest pain, palpitations, dyspnea on exertion, edema


Endocrine: Denies: fatigue


Gastrointestinal: Denies: abdominal pain, nausea, vomiting, diarrhea, 

constipation, hematemesis, melena, hematochezia


Genitourinary: Denies: urgency, dysuria, frequency, hematuria, discharge


Musculoskeletal: Denies: back pain


Skin: Denies: rash, lesions


Neurological: Reports: headache (sharp,stabbing headache lasting from minute to 

hours ).  Denies: weakness, numbness, paresthesias, confusion, abnormal gait





Past Medical History


Past Medical History: No Reported History


History of Any Multi-Drug Resistant Organisms: None Reported


Past Surgical History: No Surgical Hx Reported


Past Psychological History: Schizophrenia


Smoking Status: Current every day smoker


Past Alcohol Use History: None Reported


Past Drug Use History: None Reported





General Exam





- General Exam Comments


Initial Comments: 


General:  The patient is awake and alert, in no distress, and does not appear 

acutely ill. Nontoxic


Eye:  +3 mm pupils are equal, round and reactive to light, extra-ocular 

movements are intact.  No nystagmus.  There is normal conjunctiva bilaterally.  

No signs of icterus.  


Ears, nose, mouth and throat:  There are moist mucous membranes and no oral 

lesions.  Oropharynx mildly erythematous, there is no tonsillar enlargement, 

exudates or lesions.  Uvula is midline, there is no neck masses.  No palpable 

anterior cervical adenopathy, no tongue swelling noted swelling of the lips.  

Tympanic membranes are within normal limits bilaterally.  External auditory 

canals within normal limits bilaterally


Neck:  The neck is supple, there is no tenderness or JVD.  No nuchal rigidity, 

negative Kernig and Brudzinski's.


Cardiovascular:  There is a regular rate and rhythm. No murmur, rub or gallop 

is appreciated.


Respiratory:  Lungs are clear to auscultation, respirations are non-labored, 

breath sounds are equal.  No wheezes, stridor, rales, or rhonchi.


Gastrointestinal:  Soft, non-distended, non-tender abdomen without masses or 

organomegaly noted. There is no rebound or guarding present.  No CVA 

tenderness. Bowel sounds are unremarkable.


Musculoskeletal:  Normal ROM, no tenderness.  Strength 5/5. Sensation intact. 

Radial pulses equal bilaterally 2+.  


Neurological:  A&O x 3. CN II-XII intact, There are no obvious motor or sensory 

deficits. Coordination appears grossly intact. Speech is normal.


Skin:  Skin is warm and dry and no rashes or lesions are noted. 


Psychiatric:  Cooperative, appropriate mood & affect, normal judgment.  





Limitations: no limitations





Course


 Vital Signs











  11/12/18 11/12/18





  12:35 15:33


 


Temperature 98.1 F 97.1 F L


 


Pulse Rate 70 60


 


Respiratory 18 18





Rate  


 


Blood Pressure 134/84 133/76


 


O2 Sat by Pulse 98 98





Oximetry  














Medical Decision Making





- Medical Decision Making


28yo with cc of coital headache, and nonspecific throat discomfort on off x 3 

days. Pt denies current symptoms.  No focal neurological deficits on exam, no 

meningeal signs.  Signs of respiratory distress or findings concerning for 

ALLERGIC reaction.  Vital signs within acceptable limits.  Rapid strep testing 

negative.  Influenza testing negative.  CT obtained without contrast, no acute 

intracranial process.  Case discussed in detail Dr. Caal who at this time feel 

patient is stable for discharge with primary care follow-up in 1-2 days. I 

agree with plan, all findings were discussed with patient, patient is agreeable 

to discharge.  Patient was given strict return parameters including return for 

headache, nausea, vomiting, visual changes or any other concerning symptoms.  

In addition I discussed possibility of neurology follow-up for further 

evaluation of coital headaches, patient states that he will discuss this with 

his primary care provider and obtain referral if deemed necessary.  Patient 

verbalized understanding of plan.  Patient was instructed to take over-the-

counter ibuprofen and Tylenol for pain management as needed.  Patient was 

discharged in stable condition. 








- Lab Data


 Lab Results











  11/12/18 11/12/18 Range/Units





  13:40 13:40 


 


Influenza Type A RNA  Not Detected   (Not Detectd)  


 


Influenza Type B (PCR)  Not Detected   (Not Detectd)  


 


Group A Strep Rapid   Negative  (Negative)  














Disposition


Clinical Impression: 


 Coital headache, Throat discomfort





Disposition: HOME SELF-CARE


Condition: Good


Instructions:  Acute Headache (ED)


Additional Instructions: 


Please use medication as discussed.  Please follow-up with family doctor in the 

next 2 days, for evaluation and possible neurological referral. Please return 

to emergency room immediately if the symptoms increase or worsen or for any 

other concerns, including return of symptoms, nausea, vomiting, visual changes, 

weakness of UE or any other concerning symptoms.


Is patient prescribed a controlled substance at d/c from ED?: No


Referrals: 


Bryon Guerra DO [Primary Care Provider] - 1-2 days


Time of Disposition: 15:18 No

## 2023-03-22 NOTE — SWALLOW BEDSIDE ASSESSMENT ADULT - SLP PERTINENT HISTORY OF CURRENT PROBLEM
89 year old female with PMHx of HTN, DM, CKD presents to ED with epigastric abdominal pain. Suspected Sepsis 2/2 aspiration PNA. CXR shows b/l patchy opacities. CT showed bilateral lower lobe consolidation, superimposed emphysema, compatible with pneumonia.

## 2023-03-22 NOTE — H&P ADULT - ATTENDING COMMENTS
Patient seen and examined at bedside independently of the residents. I read the resident's note and agree with the plan with the additions and corrections as noted below. My note supersedes the resident's note.     REVIEW OF SYSTEMS:  Negative except in HPI.      PMH: HTN, DM II, CKD    FHx: Reviewed. No fhx of asthma/copd, No fhx of liver and pulmonary disease. No fhx of hematological disorder.     Physical Exam:  GEN: No acute distress. Awake, Alert and oriented x 3.   Head: Atraumatic, Normocephalic.   Eye: PEERLA. No sclera icterus. EOMI.   ENT: Normal oropharynx, no thyromegaly, no mass, no lymphadenopathy.   LUNGS: Clear to auscultation bilaterally. No wheeze/rales/crackles.   HEART: Normal. S1/S2 present. RRR. No murmur/gallops.   ABD: Soft, non-tender, non-distended. Bowel sounds present.   EXT: No pitting edema. No erythema. No tenderness.  Integumentary: No rash, No sore, No petechia.   NEURO: CN III-XII intact. Strength: 5/5 b/l ULE. Sensory intact b/l ULE.     Vital Signs Last 24 Hrs  T(C): 37.2 (22 Mar 2023 00:10), Max: 37.2 (22 Mar 2023 00:10)  T(F): 99 (22 Mar 2023 00:10), Max: 99 (22 Mar 2023 00:10)  HR: 104 (21 Mar 2023 23:54) (100 - 104)  BP: 102/59 (21 Mar 2023 23:54) (91/52 - 102/59)  BP(mean): 77 (21 Mar 2023 23:54) (77 - 77)  RR: 18 (21 Mar 2023 23:54) (18 - 20)  SpO2: 96% (21 Mar 2023 23:54) (96% - 97%)    Parameters below as of 21 Mar 2023 23:54  Patient On (Oxygen Delivery Method): room air      Please see the above notes for Labs and radiology.     Assessment and Plan:     89 yo F with hx of HTN, DM II, CKD presents to ED from rehab for epigastric pain, found to have aspiration PNA.     Sepsis likely 2/2 aspiration PNA  - CT shows Bilateral lower lobe consolidation, superimposed emphysema, compatible with pneumonia in the appropriate clinical setting. Occlusion of peripheral lower lobe bronchi; correlation for aspiration recommended.  - s/p aztreonam and levaquin x 1 in ED.   - will give 1 dose of Vancomycin. check MRSA nares.   - c/w Levaquin and flagyl for now.   - f/u BCx, procalcitonin and atypical PNA panel.   - ID consult.   - Speech and swallow evaluation.     CHRIS on CKD vs. CKD   - serum 2.8. No baseline Cr on chart.   - c/w IVF NS @ 75cc/hr for now.   - check renal US and urine studies.   - monitor BMP.     Hyponatremia   - correct Na to glucose is 132.   - c/w IVF NS @ 75cc/hr   - monitor BMP closely.     Epigastric pain likley 2/2 Dyspepsia   - PPI BID and sucralfate   - monitor CBC closely.     Elevated troponin likley 2/2 Type II NSTEMI  - patient denied chest pain.   - trend troponin.     HTN - c/w home med  DM II - monitor FS AC HS. Insulin regimen.     DVT ppx: heparin SC  GI ppx: PPI  Diet: NPO with speech and swallow eval.   Activity: as tolerated.     Date seen by the attendin2023  Total time spent: 75 minutes.

## 2023-03-22 NOTE — H&P ADULT - NSHPPHYSICALEXAM_GEN_ALL_CORE
Constitutional; No acute distress  Head: Atraumatic, normocephalic  Lungs: crackles on Rt lower lung  Heart: S1, S2+; no murmurs  Abd: Soft non tender non distended  Ext: no pedal edema  Neuro: AOX4; no focal deficits  Skin: No rashes or lesions

## 2023-03-22 NOTE — H&P ADULT - HISTORY OF PRESENT ILLNESS
89 year old female withPMHx of HTN, DM, CKD presents to ed with epigastric abdominal pain. Pt is [place] and limited historian, further hx was obtained from daughter Chanelle. Per daughter, pt recently moved to [place] from [place] 2 months ago and was admitted to hospital in [place] last month with pneumonia and discharged to rehab where she was complaining of burning stomach pain and was discharged on protonix (endoscopy was denied because of her age) Pt had a nephrologist in [place] per daughter but does not remember the patient's baseline kidney function. Denies chest pain, nausea, vomiting, SOB.    In ED, pt's vitals are BP 91/52, Tmin 95.6, on RA  Labs show WBC 13K, HgB 10.4, Na+ 125, Cr 2.7, Glu 418  Lactate 2.4 Trops 0.04  EKG showed no ischemic changes  CXR shows b/l patchy opacities (f/u official read)  CT A/P showed Bilateral lower lobe consolidation, occlusion of peripheral lower lobe bronchi suggestive of aspiration  Admitted to medicine for pneumonia management

## 2023-03-22 NOTE — CONSULT NOTE ADULT - SUBJECTIVE AND OBJECTIVE BOX
RAUL LYNN  89y, Female  Allergy: penicillin (Unknown)    CHIEF COMPLAINT: pneumonia (22 Mar 2023 09:26)    HPI:  HPI:  89 year old female with PMHx of HTN, DM, CKD presents to ed with epigastric abdominal pain. Pt is [place] with no hearing aids at the time evaluation. Daughter at bedside reprots that her mother was living in [place] for 22 years then moved to [place] two months ago. Last month she was admitted to a hospital in [place] for PNA, no data or imaging avaliable from that admission. No presents with epigastric pain of one day described as "seesaw" feeling associated with productive coughing. No report of fever chills, diarrhea CP. Pt was short of breath for a few days before presentation but now feels better. Currently saturating 95% on 4L NC       In ED, pt's vitals are BP 91/52, Tmin 95.6, on RA  Labs show WBC 13K, HgB 10.4, Na+ 125, Cr 2.7, Glu 418  Lactate 2.4 Trops 0.04  EKG showed no ischemic changes  CXR shows b/l patchy opacities (f/u official read)  CT A/P showed Bilateral lower lobe consolidation, occlusion of peripheral lower lobe bronchi suggestive of aspiration  Admitted to medicine for pneumonia management (22 Mar 2023 02:08)      Infectious Diseases History:  Old Micro Data/Cultures: No hx available     FAMILY HISTORY: No pertinent hx in first degree relatives     PAST MEDICAL & SURGICAL HISTORY:  Hypertension  Diabetes mellitus  Chronic kidney disease, unspecified CKD stage        SOCIAL HISTORY  Social History: No EtOH, tobacco or recreational drug use       Recent Travel: Was [place], [place] but no recent international travel   Other Exposures: No sick contacts     ROS  General: Denies rigors, nightsweats  HEENT: Denies headache, rhinorrhea, sore throat, eye pain  CV: Denies CP, palpitations  PULM: Denies wheezing, hemoptysis  GI: +ve abdominal pain   : Denies discharge, hematuria  MSK: Denies arthralgias, myalgias  SKIN: Denies rash, lesions  NEURO: Denies paresthesias, weakness  PSYCH: Denies depression, anxiety    VITALS:  T(F): 98, Max: 99 (03-22-23 @ 00:10)  HR: 98  BP: 104/57  RR: 20Vital Signs Last 24 Hrs  T(C): 36.7 (22 Mar 2023 07:35), Max: 37.2 (22 Mar 2023 00:10)  T(F): 98 (22 Mar 2023 07:35), Max: 99 (22 Mar 2023 00:10)  HR: 98 (22 Mar 2023 07:35) (98 - 104)  BP: 104/57 (22 Mar 2023 07:35) (91/52 - 104/57)  BP(mean): 77 (21 Mar 2023 23:54) (77 - 77)  RR: 20 (22 Mar 2023 07:35) (18 - 20)  SpO2: 98% (22 Mar 2023 07:35) (96% - 98%)    Parameters below as of 22 Mar 2023 07:35  Patient On (Oxygen Delivery Method): nasal cannula        PHYSICAL EXAM:  Gen: Underweight elderly female resting comfortable in the ED   HEENT: Normocephalic, atraumatic  Neck: supple, no lymphadenopathy  CV: Regular rate & regular rhythm  Lungs: CTAB  Abdomen: Soft, BS present  Ext: Warm, well perfused  Neuro: non focal, awake  Skin: no rash, no lesions  Lines: no phlebitis    TESTS & MEASUREMENTS:                        9.4    12.68 )-----------( 394      ( 22 Mar 2023 09:05 )             29.6     03-22    132<L>  |  96<L>  |  64<HH>  ----------------------------<  56<L>  4.0   |  23  |  2.7<H>    Ca    9.2      22 Mar 2023 09:05  Mg     1.4     03-22    TPro  6.2  /  Alb  2.7<L>  /  TBili  <0.2  /  DBili  x   /  AST  9   /  ALT  <5  /  AlkPhos  121<H>  03-22      LIVER FUNCTIONS - ( 22 Mar 2023 09:05 )  Alb: 2.7 g/dL / Pro: 6.2 g/dL / ALK PHOS: 121 U/L / ALT: <5 U/L / AST: 9 U/L / GGT: x                 Lactate, Blood: 1.5 mmol/L (03-22-23 @ 09:05)  Lactate, Blood: 2.4 mmol/L (03-21-23 @ 19:31)      INFECTIOUS DISEASES TESTING      RADIOLOGY & ADDITIONAL TESTS:  I have personally reviewed the last available Chest xray  CXR      CT  CT Abdomen and Pelvis No Cont:   ACC: [id number] EXAM:  CT ABDOMEN AND PELVIS   ORDERED BY: MIRI FLORES     PROCEDURE DATE:  03/21/2023          INTERPRETATION:  CLINICAL STATEMENT: Abdominal pain.    TECHNIQUE: Contiguous axial CT images were obtained from the lower chest   to thepubic symphysis without intravenous contrast.  Oral contrast was   not administered.  Reformatted images in the coronal and sagittal planes   were acquired.    Comparison: None.    FINDINGS:    LOWER CHEST: Bilateral lower lobe consolidation, superimposed emphysema,   compatible with pneumonia in the appropriate clinical setting. Occlusion   of peripheral lower lobe bronchi; correlation for aspiration recommended.    HEPATOBILIARY: Cholelithiasis. Liver unremarkable. No biliary dilation.    SPLEEN: Unremarkable.    PANCREAS: Unremarkable.    ADRENAL GLANDS: Unremarkable.    KIDNEYS: No hydronephrosis. Bilateral renal cysts, not completely   evaluated. Nonobstructing left renal calculi or vascular calcifications.    ABDOMINOPELVIC NODES: Unremarkable.    PELVIC ORGANS: Unremarkable.    PERITONEUM/MESENTERY/BOWEL: Colonic diverticulosis. No ascites. No bowel   obstruction. No pneumoperitoneum.    BONES/SOFT TISSUES: Osteopenia. Degenerative change of spine.    OTHER: Vascular calcifications.      IMPRESSION:    Bilateral lower lobe consolidation, superimposed emphysema, compatible   with pneumonia in the appropriate clinical setting.    Occlusion of peripheral lower lobe bronchi; correlation for aspiration   recommended.    --- End of Report ---            DELMA RAMIREZ MD; Attending Radiologist  This document has been electronically signed. Mar 21 2023 10:07PM (03-21-23 @ 22:01)      CARDIOLOGY TESTING  12 Lead ECG:   Ventricular Rate 98 BPM    Atrial Rate 98 BPM    P-R Interval 154 ms    QRS Duration 80 ms    Q-T Interval 328 ms    QTC Calculation(Bazett) 418 ms    P Axis 75 degrees    R Axis 73 degrees    T Axis 68 degrees    Diagnosis Line Normal sinus rhythm  Normal ECG    Confirmed by CHRISSY FRIEDMAN MD ([id number]) on 3/22/2023 6:45:43 AM (03-21-23 @ 21:52)      All available historical records have been reviewed    MEDICATIONS  atorvastatin 20  dextrose 5%. 1000  dextrose 5%. 1000  dextrose 50% Injectable 25  dextrose 50% Injectable 12.5  dextrose 50% Injectable 25  glucagon  Injectable 1  heparin   Injectable 5000  insulin lispro (ADMELOG) corrective regimen sliding scale   lactobacillus acidophilus 1  levoFLOXacin IVPB 500  metroNIDAZOLE  IVPB 500  pantoprazole    Tablet 40  polyethylene glycol 3350 17  sodium chloride 0.9%. 1000  sucralfate 1      ANTIBIOTICS:  levoFLOXacin IVPB 500 milliGRAM(s) IV Intermittent every 48 hours  metroNIDAZOLE  IVPB 500 milliGRAM(s) IV Intermittent every 8 hours      All available historical data has been reviewed    ASSESSMENT  89y Female with a PMH of CKD, DM, HTN presented with abdominal pain, positive CXR admitted for aspiration PNA     IMPRESSION  #Likely sepsis on admission: hypotension low temperature, currently on 4L NC sat 94%   #B/l lower lobes consolidation on CT   #Recent admission for PNA last month in [place]     #CKD  #DM  #HTN  #[place]      RECOMMENDATIONS  - C/w Levaquin for a total of 10 days, can be transitioned to PO if discharged earlier   - D/c Flagyl   - Sputum and blood clx   This is a pended note. All final recommendations to follow pending discussion with ID Attending    RAUL LYNN  89y, Female  Allergy: penicillin (Unknown)    CHIEF COMPLAINT: pneumonia (22 Mar 2023 09:26)    HPI:  HPI:  89 year old female with PMHx of HTN, DM, CKD presents to ed with epigastric abdominal pain. Pt is [place] with no hearing aids at the time evaluation. Daughter at bedside reprots that her mother was living in [place] for 22 years then moved to [place] two months ago. Last month she was admitted to a hospital in [place] for PNA, no data or imaging avaliable from that admission. No presents with epigastric pain of one day described as "seesaw" feeling associated with productive coughing. No report of fever chills, diarrhea CP. Pt was short of breath for a few days before presentation but now feels better. Currently saturating 95% on 4L NC       In ED, pt's vitals are BP 91/52, Tmin 95.6, on RA  Labs show WBC 13K, HgB 10.4, Na+ 125, Cr 2.7, Glu 418  Lactate 2.4 Trops 0.04  EKG showed no ischemic changes  CXR shows b/l patchy opacities (f/u official read)  CT A/P showed Bilateral lower lobe consolidation, occlusion of peripheral lower lobe bronchi suggestive of aspiration  Admitted to medicine for pneumonia management (22 Mar 2023 02:08)      Infectious Diseases History:  Old Micro Data/Cultures: No hx available     FAMILY HISTORY: No pertinent hx in first degree relatives     PAST MEDICAL & SURGICAL HISTORY:  Hypertension  Diabetes mellitus  Chronic kidney disease, unspecified CKD stage        SOCIAL HISTORY  Social History: No EtOH, tobacco or recreational drug use       Recent Travel: Was [place], [place] but no recent international travel   Other Exposures: No sick contacts     ROS  General: Denies rigors, nightsweats  HEENT: Denies headache, rhinorrhea, sore throat, eye pain  CV: Denies CP, palpitations  PULM: Denies wheezing, hemoptysis  GI: +ve abdominal pain   : Denies discharge, hematuria  MSK: Denies arthralgias, myalgias  SKIN: Denies rash, lesions  NEURO: Denies paresthesias, weakness  PSYCH: Denies depression, anxiety    VITALS:  T(F): 98, Max: 99 (03-22-23 @ 00:10)  HR: 98  BP: 104/57  RR: 20Vital Signs Last 24 Hrs  T(C): 36.7 (22 Mar 2023 07:35), Max: 37.2 (22 Mar 2023 00:10)  T(F): 98 (22 Mar 2023 07:35), Max: 99 (22 Mar 2023 00:10)  HR: 98 (22 Mar 2023 07:35) (98 - 104)  BP: 104/57 (22 Mar 2023 07:35) (91/52 - 104/57)  BP(mean): 77 (21 Mar 2023 23:54) (77 - 77)  RR: 20 (22 Mar 2023 07:35) (18 - 20)  SpO2: 98% (22 Mar 2023 07:35) (96% - 98%)    Parameters below as of 22 Mar 2023 07:35  Patient On (Oxygen Delivery Method): nasal cannula        PHYSICAL EXAM:  Gen: Underweight elderly female resting comfortable in the ED   HEENT: Normocephalic, atraumatic  Neck: supple, no lymphadenopathy  CV: Regular rate & regular rhythm  Lungs: CTAB  Abdomen: Soft, BS present  Ext: Warm, well perfused  Neuro: non focal, awake  Skin: no rash, no lesions  Lines: no phlebitis    TESTS & MEASUREMENTS:                        9.4    12.68 )-----------( 394      ( 22 Mar 2023 09:05 )             29.6     03-22    132<L>  |  96<L>  |  64<HH>  ----------------------------<  56<L>  4.0   |  23  |  2.7<H>    Ca    9.2      22 Mar 2023 09:05  Mg     1.4     03-22    TPro  6.2  /  Alb  2.7<L>  /  TBili  <0.2  /  DBili  x   /  AST  9   /  ALT  <5  /  AlkPhos  121<H>  03-22      LIVER FUNCTIONS - ( 22 Mar 2023 09:05 )  Alb: 2.7 g/dL / Pro: 6.2 g/dL / ALK PHOS: 121 U/L / ALT: <5 U/L / AST: 9 U/L / GGT: x                 Lactate, Blood: 1.5 mmol/L (03-22-23 @ 09:05)  Lactate, Blood: 2.4 mmol/L (03-21-23 @ 19:31)      INFECTIOUS DISEASES TESTING      RADIOLOGY & ADDITIONAL TESTS:  I have personally reviewed the last available Chest xray  CXR      CT  CT Abdomen and Pelvis No Cont:   ACC: [id number] EXAM:  CT ABDOMEN AND PELVIS   ORDERED BY: MIRI FLORES     PROCEDURE DATE:  03/21/2023          INTERPRETATION:  CLINICAL STATEMENT: Abdominal pain.    TECHNIQUE: Contiguous axial CT images were obtained from the lower chest   to thepubic symphysis without intravenous contrast.  Oral contrast was   not administered.  Reformatted images in the coronal and sagittal planes   were acquired.    Comparison: None.    FINDINGS:    LOWER CHEST: Bilateral lower lobe consolidation, superimposed emphysema,   compatible with pneumonia in the appropriate clinical setting. Occlusion   of peripheral lower lobe bronchi; correlation for aspiration recommended.    HEPATOBILIARY: Cholelithiasis. Liver unremarkable. No biliary dilation.    SPLEEN: Unremarkable.    PANCREAS: Unremarkable.    ADRENAL GLANDS: Unremarkable.    KIDNEYS: No hydronephrosis. Bilateral renal cysts, not completely   evaluated. Nonobstructing left renal calculi or vascular calcifications.    ABDOMINOPELVIC NODES: Unremarkable.    PELVIC ORGANS: Unremarkable.    PERITONEUM/MESENTERY/BOWEL: Colonic diverticulosis. No ascites. No bowel   obstruction. No pneumoperitoneum.    BONES/SOFT TISSUES: Osteopenia. Degenerative change of spine.    OTHER: Vascular calcifications.      IMPRESSION:    Bilateral lower lobe consolidation, superimposed emphysema, compatible   with pneumonia in the appropriate clinical setting.    Occlusion of peripheral lower lobe bronchi; correlation for aspiration   recommended.    --- End of Report ---            DELMA RAMIREZ MD; Attending Radiologist  This document has been electronically signed. Mar 21 2023 10:07PM (03-21-23 @ 22:01)      CARDIOLOGY TESTING  12 Lead ECG:   Ventricular Rate 98 BPM    Atrial Rate 98 BPM    P-R Interval 154 ms    QRS Duration 80 ms    Q-T Interval 328 ms    QTC Calculation(Bazett) 418 ms    P Axis 75 degrees    R Axis 73 degrees    T Axis 68 degrees    Diagnosis Line Normal sinus rhythm  Normal ECG    Confirmed by CHRISSY FRIEDMAN MD ([id number]) on 3/22/2023 6:45:43 AM (03-21-23 @ 21:52)      All available historical records have been reviewed    MEDICATIONS  atorvastatin 20  dextrose 5%. 1000  dextrose 5%. 1000  dextrose 50% Injectable 25  dextrose 50% Injectable 12.5  dextrose 50% Injectable 25  glucagon  Injectable 1  heparin   Injectable 5000  insulin lispro (ADMELOG) corrective regimen sliding scale   lactobacillus acidophilus 1  levoFLOXacin IVPB 500  metroNIDAZOLE  IVPB 500  pantoprazole    Tablet 40  polyethylene glycol 3350 17  sodium chloride 0.9%. 1000  sucralfate 1      ANTIBIOTICS:  levoFLOXacin IVPB 500 milliGRAM(s) IV Intermittent every 48 hours  metroNIDAZOLE  IVPB 500 milliGRAM(s) IV Intermittent every 8 hours      All available historical data has been reviewed    ASSESSMENT  89y Female with a PMH of CKD, DM, HTN presented with abdominal pain, positive CXR admitted for aspiration PNA     IMPRESSION  #Likely sepsis on admission: hypotension low temperature, currently on 4L NC sat 94%   #B/l lower lobes consolidation on CT   #Recent admission for PNA last month in [place]     #CKD  #DM  #HTN  #[place]      RECOMMENDATIONS  - C/w Levaquin for a total of 10 days, can be transitioned to PO if discharged earlier   - D/c Flagyl   - SLP evaluation completed   - Sputum and blood clx   This is a pended note. All final recommendations to follow pending discussion with ID Attending    RAUL LYNN  89y, Female  Allergy: penicillin (Unknown)    CHIEF COMPLAINT: pneumonia (22 Mar 2023 09:26)    HPI:  HPI:  89 year old female with PMHx of HTN, DM, CKD presents to ed with epigastric abdominal pain. Pt is [place] with no hearing aids at the time evaluation. Daughter at bedside reprots that her mother was living in [place] for 22 years then moved to [place] two months ago. Last month she was admitted to a hospital in [place] for PNA, no data or imaging avaliable from that admission. No presents with epigastric pain of one day described as "seesaw" feeling associated with productive coughing. No report of fever chills, diarrhea CP. Pt was short of breath for a few days before presentation but now feels better. Currently saturating 95% on 4L NC       In ED, pt's vitals are BP 91/52, Tmin 95.6, on RA  Labs show WBC 13K, HgB 10.4, Na+ 125, Cr 2.7, Glu 418  Lactate 2.4 Trops 0.04  EKG showed no ischemic changes  CXR shows b/l patchy opacities (f/u official read)  CT A/P showed Bilateral lower lobe consolidation, occlusion of peripheral lower lobe bronchi suggestive of aspiration  Admitted to medicine for pneumonia management (22 Mar 2023 02:08)      Infectious Diseases History:  Old Micro Data/Cultures: No hx available     FAMILY HISTORY: No pertinent hx in first degree relatives     PAST MEDICAL & SURGICAL HISTORY:  Hypertension  Diabetes mellitus  Chronic kidney disease, unspecified CKD stage        SOCIAL HISTORY  Social History: No EtOH, tobacco or recreational drug use       Recent Travel: Was [place], [place] but no recent international travel   Other Exposures: No sick contacts     ROS  General: Denies rigors, nightsweats  HEENT: Denies headache, rhinorrhea, sore throat, eye pain  CV: Denies CP, palpitations  PULM: Denies wheezing, hemoptysis  GI: +ve abdominal pain   : Denies discharge, hematuria  MSK: Denies arthralgias, myalgias  SKIN: Denies rash, lesions  NEURO: Denies paresthesias, weakness  PSYCH: Denies depression, anxiety    VITALS:  T(F): 98, Max: 99 (03-22-23 @ 00:10)  HR: 98  BP: 104/57  RR: 20Vital Signs Last 24 Hrs  T(C): 36.7 (22 Mar 2023 07:35), Max: 37.2 (22 Mar 2023 00:10)  T(F): 98 (22 Mar 2023 07:35), Max: 99 (22 Mar 2023 00:10)  HR: 98 (22 Mar 2023 07:35) (98 - 104)  BP: 104/57 (22 Mar 2023 07:35) (91/52 - 104/57)  BP(mean): 77 (21 Mar 2023 23:54) (77 - 77)  RR: 20 (22 Mar 2023 07:35) (18 - 20)  SpO2: 98% (22 Mar 2023 07:35) (96% - 98%)    Parameters below as of 22 Mar 2023 07:35  Patient On (Oxygen Delivery Method): nasal cannula        PHYSICAL EXAM:  Gen: Underweight elderly female resting comfortable in the ED   HEENT: Normocephalic, atraumatic  Neck: supple, no lymphadenopathy  CV: Regular rate & regular rhythm  Lungs: rhonchi lower lobes  Abdomen: Soft, BS present  Ext: Warm, well perfused  Neuro: non focal, awake  Skin: no rash, no lesions  Lines: no phlebitis    TESTS & MEASUREMENTS:                        9.4    12.68 )-----------( 394      ( 22 Mar 2023 09:05 )             29.6     03-22    132<L>  |  96<L>  |  64<HH>  ----------------------------<  56<L>  4.0   |  23  |  2.7<H>    Ca    9.2      22 Mar 2023 09:05  Mg     1.4     03-22    TPro  6.2  /  Alb  2.7<L>  /  TBili  <0.2  /  DBili  x   /  AST  9   /  ALT  <5  /  AlkPhos  121<H>  03-22      LIVER FUNCTIONS - ( 22 Mar 2023 09:05 )  Alb: 2.7 g/dL / Pro: 6.2 g/dL / ALK PHOS: 121 U/L / ALT: <5 U/L / AST: 9 U/L / GGT: x                 Lactate, Blood: 1.5 mmol/L (03-22-23 @ 09:05)  Lactate, Blood: 2.4 mmol/L (03-21-23 @ 19:31)      INFECTIOUS DISEASES TESTING      RADIOLOGY & ADDITIONAL TESTS:  I have personally reviewed the last available Chest xray  CXR      CT  CT Abdomen and Pelvis No Cont:   ACC: [id number] EXAM:  CT ABDOMEN AND PELVIS   ORDERED BY: MIRI FLORES     PROCEDURE DATE:  03/21/2023          INTERPRETATION:  CLINICAL STATEMENT: Abdominal pain.    TECHNIQUE: Contiguous axial CT images were obtained from the lower chest   to thepubic symphysis without intravenous contrast.  Oral contrast was   not administered.  Reformatted images in the coronal and sagittal planes   were acquired.    Comparison: None.    FINDINGS:    LOWER CHEST: Bilateral lower lobe consolidation, superimposed emphysema,   compatible with pneumonia in the appropriate clinical setting. Occlusion   of peripheral lower lobe bronchi; correlation for aspiration recommended.    HEPATOBILIARY: Cholelithiasis. Liver unremarkable. No biliary dilation.    SPLEEN: Unremarkable.    PANCREAS: Unremarkable.    ADRENAL GLANDS: Unremarkable.    KIDNEYS: No hydronephrosis. Bilateral renal cysts, not completely   evaluated. Nonobstructing left renal calculi or vascular calcifications.    ABDOMINOPELVIC NODES: Unremarkable.    PELVIC ORGANS: Unremarkable.    PERITONEUM/MESENTERY/BOWEL: Colonic diverticulosis. No ascites. No bowel   obstruction. No pneumoperitoneum.    BONES/SOFT TISSUES: Osteopenia. Degenerative change of spine.    OTHER: Vascular calcifications.      IMPRESSION:    Bilateral lower lobe consolidation, superimposed emphysema, compatible   with pneumonia in the appropriate clinical setting.    Occlusion of peripheral lower lobe bronchi; correlation for aspiration   recommended.    --- End of Report ---            DELMA RAMIREZ MD; Attending Radiologist  This document has been electronically signed. Mar 21 2023 10:07PM (03-21-23 @ 22:01)      CARDIOLOGY TESTING  12 Lead ECG:   Ventricular Rate 98 BPM    Atrial Rate 98 BPM    P-R Interval 154 ms    QRS Duration 80 ms    Q-T Interval 328 ms    QTC Calculation(Bazett) 418 ms    P Axis 75 degrees    R Axis 73 degrees    T Axis 68 degrees    Diagnosis Line Normal sinus rhythm  Normal ECG    Confirmed by CHRISSY FRIEDMAN MD ([id number]) on 3/22/2023 6:45:43 AM (03-21-23 @ 21:52)      All available historical records have been reviewed    MEDICATIONS  atorvastatin 20  dextrose 5%. 1000  dextrose 5%. 1000  dextrose 50% Injectable 25  dextrose 50% Injectable 12.5  dextrose 50% Injectable 25  glucagon  Injectable 1  heparin   Injectable 5000  insulin lispro (ADMELOG) corrective regimen sliding scale   lactobacillus acidophilus 1  levoFLOXacin IVPB 500  metroNIDAZOLE  IVPB 500  pantoprazole    Tablet 40  polyethylene glycol 3350 17  sodium chloride 0.9%. 1000  sucralfate 1      ANTIBIOTICS:  levoFLOXacin IVPB 500 milliGRAM(s) IV Intermittent every 48 hours  metroNIDAZOLE  IVPB 500 milliGRAM(s) IV Intermittent every 8 hours      All available historical data has been reviewed

## 2023-03-22 NOTE — H&P ADULT - NSHPLABSRESULTS_GEN_ALL_CORE
(03-21 @ 19:31)                      10.4  13.97 )-----------( 368                 31.5    Neutrophils = 11.50 (82.3%)  Lymphocytes = 1.62 (11.6%)  Eosinophils = 0.11 (0.8%)  Basophils = 0.03 (0.2%)  Monocytes = 0.56 (4.0%)  Bands = --%    03-21    125<L>  |  89<L>  |  68<HH>  ----------------------------<  418<H>  4.4   |  21  |  2.7<H>    Ca    9.3      21 Mar 2023 19:31    TPro  6.5  /  Alb  2.7<L>  /  TBili  0.2  /  DBili  x   /  AST  10  /  ALT  <5  /  AlkPhos  116<H>  03-21      CARDIAC MARKERS ( 21 Mar 2023 19:31 )  Trop 0.04 ng/mL<HH> / CK x     / CKMB x           RVP:          Tox:         < from: CT Abdomen and Pelvis No Cont (03.21.23 @ 22:01) >    IMPRESSION:    Bilateral lower lobe consolidation, superimposed emphysema, compatible   with pneumonia in the appropriate clinical setting.    Occlusion of peripheral lower lobe bronchi; correlation for aspiration   recommended.    --- End of Report ---      < end of copied text >

## 2023-03-22 NOTE — SWALLOW BEDSIDE ASSESSMENT ADULT - NS SPL SWALLOW CLINIC TRIAL FT
Mild oral dysphagia with soft & bite sized likely negatively impacted by edentulous status. + tolerance for thin lqiuids, puree and soft & bite sized solids without overt s/s of penetration/ aspiration.

## 2023-03-23 LAB
ALBUMIN SERPL ELPH-MCNC: 2.4 G/DL — LOW (ref 3.5–5.2)
ALP SERPL-CCNC: 96 U/L — SIGNIFICANT CHANGE UP (ref 30–115)
ALT FLD-CCNC: <5 U/L — SIGNIFICANT CHANGE UP (ref 0–41)
ANION GAP SERPL CALC-SCNC: 11 MMOL/L — SIGNIFICANT CHANGE UP (ref 7–14)
APPEARANCE UR: CLEAR — SIGNIFICANT CHANGE UP
AST SERPL-CCNC: 9 U/L — SIGNIFICANT CHANGE UP (ref 0–41)
BASOPHILS # BLD AUTO: 0.05 K/UL — SIGNIFICANT CHANGE UP (ref 0–0.2)
BASOPHILS NFR BLD AUTO: 0.5 % — SIGNIFICANT CHANGE UP (ref 0–1)
BILIRUB SERPL-MCNC: <0.2 MG/DL — SIGNIFICANT CHANGE UP (ref 0.2–1.2)
BILIRUB UR-MCNC: NEGATIVE — SIGNIFICANT CHANGE UP
BUN SERPL-MCNC: 52 MG/DL — HIGH (ref 10–20)
C DIFF BY PCR RESULT: SIGNIFICANT CHANGE UP
CALCIUM SERPL-MCNC: 8.8 MG/DL — SIGNIFICANT CHANGE UP (ref 8.4–10.5)
CHLORIDE SERPL-SCNC: 100 MMOL/L — SIGNIFICANT CHANGE UP (ref 98–110)
CO2 SERPL-SCNC: 23 MMOL/L — SIGNIFICANT CHANGE UP (ref 17–32)
COLOR SPEC: SIGNIFICANT CHANGE UP
CREAT ?TM UR-MCNC: 40 MG/DL — SIGNIFICANT CHANGE UP
CREAT SERPL-MCNC: 2.2 MG/DL — HIGH (ref 0.7–1.5)
DIFF PNL FLD: NEGATIVE — SIGNIFICANT CHANGE UP
EGFR: 21 ML/MIN/1.73M2 — LOW
EOSINOPHIL # BLD AUTO: 0.33 K/UL — SIGNIFICANT CHANGE UP (ref 0–0.7)
EOSINOPHIL NFR BLD AUTO: 3.4 % — SIGNIFICANT CHANGE UP (ref 0–8)
GLUCOSE BLDC GLUCOMTR-MCNC: 107 MG/DL — HIGH (ref 70–99)
GLUCOSE BLDC GLUCOMTR-MCNC: 190 MG/DL — HIGH (ref 70–99)
GLUCOSE BLDC GLUCOMTR-MCNC: 227 MG/DL — HIGH (ref 70–99)
GLUCOSE BLDC GLUCOMTR-MCNC: 282 MG/DL — HIGH (ref 70–99)
GLUCOSE SERPL-MCNC: 106 MG/DL — HIGH (ref 70–99)
GLUCOSE UR QL: ABNORMAL
HCT VFR BLD CALC: 28.2 % — LOW (ref 37–47)
HGB BLD-MCNC: 9.3 G/DL — LOW (ref 12–16)
IMM GRANULOCYTES NFR BLD AUTO: 1.3 % — HIGH (ref 0.1–0.3)
KETONES UR-MCNC: NEGATIVE — SIGNIFICANT CHANGE UP
LEUKOCYTE ESTERASE UR-ACNC: NEGATIVE — SIGNIFICANT CHANGE UP
LYMPHOCYTES # BLD AUTO: 1.6 K/UL — SIGNIFICANT CHANGE UP (ref 1.2–3.4)
LYMPHOCYTES # BLD AUTO: 16.3 % — LOW (ref 20.5–51.1)
MAGNESIUM SERPL-MCNC: 1.2 MG/DL — LOW (ref 1.8–2.4)
MAGNESIUM SERPL-MCNC: 3.2 MG/DL — CRITICAL HIGH (ref 1.8–2.4)
MCHC RBC-ENTMCNC: 29.4 PG — SIGNIFICANT CHANGE UP (ref 27–31)
MCHC RBC-ENTMCNC: 33 G/DL — SIGNIFICANT CHANGE UP (ref 32–37)
MCV RBC AUTO: 89.2 FL — SIGNIFICANT CHANGE UP (ref 81–99)
MONOCYTES # BLD AUTO: 0.42 K/UL — SIGNIFICANT CHANGE UP (ref 0.1–0.6)
MONOCYTES NFR BLD AUTO: 4.3 % — SIGNIFICANT CHANGE UP (ref 1.7–9.3)
NEUTROPHILS # BLD AUTO: 7.26 K/UL — HIGH (ref 1.4–6.5)
NEUTROPHILS NFR BLD AUTO: 74.2 % — SIGNIFICANT CHANGE UP (ref 42.2–75.2)
NITRITE UR-MCNC: NEGATIVE — SIGNIFICANT CHANGE UP
NRBC # BLD: 0 /100 WBCS — SIGNIFICANT CHANGE UP (ref 0–0)
OSMOLALITY UR: 425 MOS/KG — SIGNIFICANT CHANGE UP (ref 50–1200)
PH UR: 6 — SIGNIFICANT CHANGE UP (ref 5–8)
PLATELET # BLD AUTO: 364 K/UL — SIGNIFICANT CHANGE UP (ref 130–400)
POTASSIUM SERPL-MCNC: 3.9 MMOL/L — SIGNIFICANT CHANGE UP (ref 3.5–5)
POTASSIUM SERPL-SCNC: 3.9 MMOL/L — SIGNIFICANT CHANGE UP (ref 3.5–5)
PROT ?TM UR-MCNC: 16 MG/DLG/24H — SIGNIFICANT CHANGE UP
PROT SERPL-MCNC: 5.7 G/DL — LOW (ref 6–8)
PROT UR-MCNC: SIGNIFICANT CHANGE UP
PROT/CREAT UR-RTO: 0.4 RATIO — HIGH (ref 0–0.2)
RBC # BLD: 3.16 M/UL — LOW (ref 4.2–5.4)
RBC # FLD: 15.9 % — HIGH (ref 11.5–14.5)
SODIUM SERPL-SCNC: 134 MMOL/L — LOW (ref 135–146)
SODIUM UR-SCNC: 74 MMOL/L — SIGNIFICANT CHANGE UP
SP GR SPEC: 1.01 — SIGNIFICANT CHANGE UP (ref 1.01–1.03)
UROBILINOGEN FLD QL: SIGNIFICANT CHANGE UP
UUN UR-MCNC: 561 MG/DL — SIGNIFICANT CHANGE UP
WBC # BLD: 9.79 K/UL — SIGNIFICANT CHANGE UP (ref 4.8–10.8)
WBC # FLD AUTO: 9.79 K/UL — SIGNIFICANT CHANGE UP (ref 4.8–10.8)

## 2023-03-23 PROCEDURE — 99223 1ST HOSP IP/OBS HIGH 75: CPT

## 2023-03-23 PROCEDURE — 71045 X-RAY EXAM CHEST 1 VIEW: CPT | Mod: 26

## 2023-03-23 PROCEDURE — 99233 SBSQ HOSP IP/OBS HIGH 50: CPT

## 2023-03-23 RX ORDER — MAGNESIUM SULFATE 500 MG/ML
2 VIAL (ML) INJECTION
Refills: 0 | Status: COMPLETED | OUTPATIENT
Start: 2023-03-23 | End: 2023-03-23

## 2023-03-23 RX ORDER — ALBUTEROL 90 UG/1
1 AEROSOL, METERED ORAL EVERY 6 HOURS
Refills: 0 | Status: DISCONTINUED | OUTPATIENT
Start: 2023-03-23 | End: 2023-03-25

## 2023-03-23 RX ORDER — CEFEPIME 1 G/1
1000 INJECTION, POWDER, FOR SOLUTION INTRAMUSCULAR; INTRAVENOUS EVERY 24 HOURS
Refills: 0 | Status: DISCONTINUED | OUTPATIENT
Start: 2023-03-24 | End: 2023-03-24

## 2023-03-23 RX ORDER — ACETAMINOPHEN 500 MG
650 TABLET ORAL EVERY 6 HOURS
Refills: 0 | Status: DISCONTINUED | OUTPATIENT
Start: 2023-03-23 | End: 2023-03-25

## 2023-03-23 RX ORDER — CEFEPIME 1 G/1
1000 INJECTION, POWDER, FOR SOLUTION INTRAMUSCULAR; INTRAVENOUS ONCE
Refills: 0 | Status: COMPLETED | OUTPATIENT
Start: 2023-03-23 | End: 2023-03-23

## 2023-03-23 RX ORDER — TIOTROPIUM BROMIDE 18 UG/1
2 CAPSULE ORAL; RESPIRATORY (INHALATION) DAILY
Refills: 0 | Status: DISCONTINUED | OUTPATIENT
Start: 2023-03-23 | End: 2023-03-25

## 2023-03-23 RX ORDER — CEFEPIME 1 G/1
INJECTION, POWDER, FOR SOLUTION INTRAMUSCULAR; INTRAVENOUS
Refills: 0 | Status: DISCONTINUED | OUTPATIENT
Start: 2023-03-23 | End: 2023-03-24

## 2023-03-23 RX ORDER — BUDESONIDE AND FORMOTEROL FUMARATE DIHYDRATE 160; 4.5 UG/1; UG/1
2 AEROSOL RESPIRATORY (INHALATION)
Refills: 0 | Status: DISCONTINUED | OUTPATIENT
Start: 2023-03-23 | End: 2023-03-25

## 2023-03-23 RX ORDER — GUAIFENESIN/DEXTROMETHORPHAN 600MG-30MG
5 TABLET, EXTENDED RELEASE 12 HR ORAL EVERY 6 HOURS
Refills: 0 | Status: DISCONTINUED | OUTPATIENT
Start: 2023-03-23 | End: 2023-03-25

## 2023-03-23 RX ADMIN — Medication 1 GRAM(S): at 06:00

## 2023-03-23 RX ADMIN — Medication 5 MILLILITER(S): at 23:09

## 2023-03-23 RX ADMIN — Medication 2: at 16:54

## 2023-03-23 RX ADMIN — ATORVASTATIN CALCIUM 20 MILLIGRAM(S): 80 TABLET, FILM COATED ORAL at 21:41

## 2023-03-23 RX ADMIN — Medication 1 GRAM(S): at 17:08

## 2023-03-23 RX ADMIN — Medication 1 GRAM(S): at 00:04

## 2023-03-23 RX ADMIN — Medication 30 MILLILITER(S): at 12:42

## 2023-03-23 RX ADMIN — Medication 6: at 12:33

## 2023-03-23 RX ADMIN — Medication 25 GRAM(S): at 14:36

## 2023-03-23 RX ADMIN — Medication 100 MILLIGRAM(S): at 05:35

## 2023-03-23 RX ADMIN — CEFEPIME 1000 MILLIGRAM(S): 1 INJECTION, POWDER, FOR SOLUTION INTRAMUSCULAR; INTRAVENOUS at 15:06

## 2023-03-23 RX ADMIN — Medication 5 MILLILITER(S): at 21:41

## 2023-03-23 RX ADMIN — Medication 650 MILLIGRAM(S): at 12:43

## 2023-03-23 RX ADMIN — HEPARIN SODIUM 5000 UNIT(S): 5000 INJECTION INTRAVENOUS; SUBCUTANEOUS at 05:36

## 2023-03-23 RX ADMIN — PANTOPRAZOLE SODIUM 40 MILLIGRAM(S): 20 TABLET, DELAYED RELEASE ORAL at 06:00

## 2023-03-23 RX ADMIN — PANTOPRAZOLE SODIUM 40 MILLIGRAM(S): 20 TABLET, DELAYED RELEASE ORAL at 17:08

## 2023-03-23 RX ADMIN — Medication 25 GRAM(S): at 13:35

## 2023-03-23 RX ADMIN — Medication 1 GRAM(S): at 23:09

## 2023-03-23 RX ADMIN — Medication 25 GRAM(S): at 11:41

## 2023-03-23 RX ADMIN — Medication 1 TABLET(S): at 11:41

## 2023-03-23 RX ADMIN — HEPARIN SODIUM 5000 UNIT(S): 5000 INJECTION INTRAVENOUS; SUBCUTANEOUS at 17:08

## 2023-03-23 RX ADMIN — Medication 1 GRAM(S): at 11:41

## 2023-03-23 NOTE — PROGRESS NOTE ADULT - SUBJECTIVE AND OBJECTIVE BOX
Patient is a 89y old  Female who presents with a chief complaint of pneumonia (23 Mar 2023 14:59)      Patient seen and examined at bedside.  Patient denies any chest pain reports continued cough and shortness of breath   ALLERGIES:  penicillin (Unknown)    MEDICATIONS:  acetaminophen     Tablet .. 650 milliGRAM(s) Oral every 6 hours PRN  albuterol    90 MICROgram(s) HFA Inhaler 1 Puff(s) Inhalation every 6 hours PRN  aluminum hydroxide/magnesium hydroxide/simethicone Suspension 30 milliLiter(s) Oral every 6 hours PRN  atorvastatin 20 milliGRAM(s) Oral at bedtime  budesonide  80 MICROgram(s)/formoterol 4.5 MICROgram(s) Inhaler 2 Puff(s) Inhalation two times a day  cefepime   IVPB      dextrose 5%. 1000 milliLiter(s) IV Continuous <Continuous>  dextrose 5%. 1000 milliLiter(s) IV Continuous <Continuous>  dextrose 50% Injectable 25 Gram(s) IV Push once  dextrose 50% Injectable 12.5 Gram(s) IV Push once  dextrose 50% Injectable 25 Gram(s) IV Push once  dextrose Oral Gel 15 Gram(s) Oral once PRN  glucagon  Injectable 1 milliGRAM(s) IntraMuscular once  heparin   Injectable 5000 Unit(s) SubCutaneous every 12 hours  influenza  Vaccine (HIGH DOSE) 0.7 milliLiter(s) IntraMuscular once  insulin lispro (ADMELOG) corrective regimen sliding scale   SubCutaneous three times a day before meals  lactobacillus acidophilus 1 Tablet(s) Oral daily  pantoprazole    Tablet 40 milliGRAM(s) Oral two times a day  polyethylene glycol 3350 17 Gram(s) Oral daily  sucralfate 1 Gram(s) Oral every 6 hours  tiotropium 2.5 MICROgram(s) Inhaler 2 Puff(s) Inhalation daily    Vital Signs Last 24 Hrs  T(F): 98.4 (23 Mar 2023 17:34), Max: 98.6 (22 Mar 2023 23:29)  HR: 87 (23 Mar 2023 17:34) (87 - 95)  BP: 123/65 (23 Mar 2023 17:34) (116/56 - 128/60)  RR: 18 (23 Mar 2023 17:34) (18 - 19)  SpO2: 96% (23 Mar 2023 12:00) (96% - 98%)  I&O's Summary    22 Mar 2023 07:01  -  23 Mar 2023 07:00  --------------------------------------------------------  IN: 100 mL / OUT: 1300 mL / NET: -1200 mL    23 Mar 2023 07:01  -  23 Mar 2023 18:55  --------------------------------------------------------  IN: 0 mL / OUT: 154 mL / NET: -154 mL        PHYSICAL EXAM:  General: NAD, Alert  ENT: MMM  Neck: Supple, No JVD  Lungs: diminished breath sounds bilaterally, no crackles   Cardio: RRR, S1/S2, 2/6 systolic murmur   Abdomen: Soft, Nontender, Nondistended; Bowel sounds present  Extremities: No cyanosis, No edema    LABS:                        9.3    9.79  )-----------( 364      ( 23 Mar 2023 06:44 )             28.2         134  |  100  |  52  ----------------------------<  106  3.9   |  23  |  2.2    Ca    8.8      23 Mar 2023 06:44  Mg     1.2         TPro  5.7  /  Alb  2.4  /  TBili  <0.2  /  DBili  x   /  AST  9   /  ALT  <5  /  AlkPhos  96          Lactate, Blood: 1.8 mmol/L ( @ 20:14)  Lactate, Blood: 1.6 mmol/L ( @ 17:49)  Lactate, Blood: 2.2 mmol/L ( @ 12:00)  Lactate, Blood: 1.5 mmol/L ( @ 09:05)  Lactate, Blood: 2.4 mmol/L ( @ 19:31)          TSH 1.23   TSH with FT4 reflex --  Total T3 --              POCT Blood Glucose.: 190 mg/dL (23 Mar 2023 16:36)  POCT Blood Glucose.: 282 mg/dL (23 Mar 2023 12:14)  POCT Blood Glucose.: 107 mg/dL (23 Mar 2023 07:38)  POCT Blood Glucose.: 144 mg/dL (22 Mar 2023 21:17)      Urinalysis Basic - ( 23 Mar 2023 15:31 )    Color: Light Yellow / Appearance: Clear / S.015 / pH: x  Gluc: x / Ketone: Negative  / Bili: Negative / Urobili: <2 mg/dL   Blood: x / Protein: Trace / Nitrite: Negative   Leuk Esterase: Negative / RBC: x / WBC x   Sq Epi: x / Non Sq Epi: x / Bacteria: x        Culture - Blood (collected 22 Mar 2023 09:07)  Source: .Blood None  Preliminary Report (23 Mar 2023 18:02):    No growth to date.      COVID-19 PCR: NotDetec (23 @ 10:50)      RADIOLOGY & ADDITIONAL TESTS:  < from: CT Abdomen and Pelvis No Cont (23 @ 22:01) >  IMPRESSION:    Bilateral lower lobe consolidation, superimposed emphysema, compatible   with pneumonia in the appropriate clinical setting.    Occlusion of peripheral lower lobe bronchi; correlation for aspiration   recommended.    < end of copied text >    Care Discussed with Consultants/Other Providers:

## 2023-03-23 NOTE — CONSULT NOTE ADULT - SUBJECTIVE AND OBJECTIVE BOX
Patient is a 89y old  Female who presents with a chief complaint of pneumonia (23 Mar 2023 10:59)      HPI:  89 year old female withPMHx of HTN, DM, CKD presents to ed with epigastric abdominal pain. Pt is [place] and limited historian, further hx was obtained from daughter Chanelle. Per daughter, pt recently moved to [place] from [place] 2 months ago and was admitted to hospital in [place] last month with pneumonia and discharged to rehab where she was complaining of burning stomach pain and was discharged on protonix (endoscopy was denied because of her age) Pt had a nephrologist in [place] per daughter but does not remember the patient's baseline kidney function. Denies chest pain, nausea, vomiting, SOB.    In ED, pt's vitals are BP 91/52, Tmin 95.6, on RA  Labs show WBC 13K, HgB 10.4, Na+ 125, Cr 2.7, Glu 418  Lactate 2.4 Trops 0.04  EKG showed no ischemic changes  CXR shows b/l patchy opacities (f/u official read)  CT A/P showed Bilateral lower lobe consolidation, occlusion of peripheral lower lobe bronchi suggestive of aspiration  Admitted to medicine for pneumonia management (22 Mar 2023 02:08)    Pulmonary was consulted for OP establishment       PAST MEDICAL & SURGICAL HISTORY:  Hypertension      Diabetes mellitus      Chronic kidney disease, unspecified CKD stage          SOCIAL HX:   Smoking                         ETOH                            Other    FAMILY HISTORY:  .  No cardiovascular or pulmonary family history     REVIEW OF SYSTEMS:    All ROS are negative except per HPI     Allergies    penicillin (Unknown)    Intolerances          PHYSICAL EXAM  Vital Signs Last 24 Hrs  T(C): 35.7 (23 Mar 2023 08:36), Max: 37 (22 Mar 2023 23:29)  T(F): 96.3 (23 Mar 2023 08:36), Max: 98.6 (22 Mar 2023 23:29)  HR: 89 (23 Mar 2023 08:36) (89 - 97)  BP: 125/59 (23 Mar 2023 08:36) (118/58 - 128/60)  BP(mean): 81 (22 Mar 2023 15:35) (81 - 81)  RR: 19 (22 Mar 2023 23:29) (19 - 20)  SpO2: 98% (22 Mar 2023 23:29) (98% - 98%)    Parameters below as of 22 Mar 2023 23:29  Patient On (Oxygen Delivery Method): room air        CONSTITUTIONAL:  Well nourished.  NAD    ENT:   Airway patent,   No thrush    EYES:   Clear bilaterally,   pupils equal,   round and reactive to light.    CARDIAC:   Normal rate,   regular rhythm.    no edema      CAROTID:   normal systolic impulse  no bruits    RESPIRATORY:   No wheezing  Nnormal chest expansion  Not tachypneic,  No use of accessory muscles    GASTROINTESTINAL:  Abdomen soft,   non-tender,   no guarding,   + BS    GENITOURINARY  normal genitalia for sex  no edema    MUSCULOSKELETAL:   range of motion is not limited,  no clubbing, cyanosis    NEUROLOGICAL:   Alert and oriented   no motor deficits.    SKIN:   Skin normal color for race,   No evidence of rash.    PSYCHIATRIC:   normal mood and affect.   no apparent risk to self or others.    HEME LYMPH:   No cervical  lymphadenopathy.  no inguinal lymphadenopathy          LABS:                          9.3    9.79  )-----------( 364      ( 23 Mar 2023 06:44 )             28.2                                               03-23    134<L>  |  100  |  52<H>  ----------------------------<  106<H>  3.9   |  23  |  2.2<H>    Ca    8.8      23 Mar 2023 06:44  Mg     1.2     03-23    TPro  5.7<L>  /  Alb  2.4<L>  /  TBili  <0.2  /  DBili  x   /  AST  9   /  ALT  <5  /  AlkPhos  96  03-23                                                 CARDIAC MARKERS ( 22 Mar 2023 12:00 )  x     / 0.03 ng/mL / x     / x     / x      CARDIAC MARKERS ( 22 Mar 2023 09:05 )  x     / 0.04 ng/mL / x     / x     / x      CARDIAC MARKERS ( 21 Mar 2023 19:31 )  x     / 0.04 ng/mL / x     / x     / x                                                LIVER FUNCTIONS - ( 23 Mar 2023 06:44 )  Alb: 2.4 g/dL / Pro: 5.7 g/dL / ALK PHOS: 96 U/L / ALT: <5 U/L / AST: 9 U/L / GGT: x                                                                                                MEDICATIONS  (STANDING):  atorvastatin 20 milliGRAM(s) Oral at bedtime  cefepime   IVPB      cefepime   IVPB 1000 milliGRAM(s) IV Intermittent once  dextrose 5%. 1000 milliLiter(s) (50 mL/Hr) IV Continuous <Continuous>  dextrose 5%. 1000 milliLiter(s) (100 mL/Hr) IV Continuous <Continuous>  dextrose 50% Injectable 25 Gram(s) IV Push once  dextrose 50% Injectable 12.5 Gram(s) IV Push once  dextrose 50% Injectable 25 Gram(s) IV Push once  glucagon  Injectable 1 milliGRAM(s) IntraMuscular once  heparin   Injectable 5000 Unit(s) SubCutaneous every 12 hours  influenza  Vaccine (HIGH DOSE) 0.7 milliLiter(s) IntraMuscular once  insulin lispro (ADMELOG) corrective regimen sliding scale   SubCutaneous three times a day before meals  lactobacillus acidophilus 1 Tablet(s) Oral daily  magnesium sulfate  IVPB 2 Gram(s) IV Intermittent every 2 hours  pantoprazole    Tablet 40 milliGRAM(s) Oral two times a day  polyethylene glycol 3350 17 Gram(s) Oral daily  sucralfate 1 Gram(s) Oral every 6 hours    MEDICATIONS  (PRN):  acetaminophen     Tablet .. 650 milliGRAM(s) Oral every 6 hours PRN Temp greater or equal to 38C (100.4F), Mild Pain (1 - 3)  aluminum hydroxide/magnesium hydroxide/simethicone Suspension 30 milliLiter(s) Oral every 6 hours PRN Dyspepsia  dextrose Oral Gel 15 Gram(s) Oral once PRN Blood Glucose LESS THAN 70 milliGRAM(s)/deciliter      X-Rays reviewed:    CXR interpreted by me: Patient is a 89y old  Female who presents with a chief complaint of pneumonia. (23 Mar 2023 10:59).      HPI:  89 year old female withPMHx of HTN, DM, CKD presents to ed with epigastric abdominal pain. Pt is [place] and limited historian, further hx was obtained from daughter Chanelle. Per daughter, pt recently moved to [place] from [place] 2 months ago and was admitted to hospital in [place] last month with pneumonia and discharged to rehab where she was complaining of burning stomach pain and was discharged on protonix (endoscopy was denied because of her age) Pt had a nephrologist in [place] per daughter but does not remember the patient's baseline kidney function. Denies chest pain, nausea, vomiting, SOB.    In ED, pt's vitals are BP 91/52, Tmin 95.6, on RA  Labs show WBC 13K, HgB 10.4, Na+ 125, Cr 2.7, Glu 418  Lactate 2.4 Trops 0.04  EKG showed no ischemic changes  CXR shows b/l patchy opacities (f/u official read)  CT A/P showed Bilateral lower lobe consolidation, occlusion of peripheral lower lobe bronchi suggestive of aspiration  Admitted to medicine for pneumonia management (22 Mar 2023 02:08)    Pulmonary was consulted for OP establishment       PAST MEDICAL & SURGICAL HISTORY:  Hypertension      Diabetes mellitus      Chronic kidney disease, unspecified CKD stage          SOCIAL HX:  ex smoker        FAMILY HISTORY:  .  No cardiovascular or pulmonary family history     REVIEW OF SYSTEMS:    All ROS are negative except per HPI     Allergies    penicillin (Unknown)    Intolerances          PHYSICAL EXAM  Vital Signs Last 24 Hrs  T(C): 35.7 (23 Mar 2023 08:36), Max: 37 (22 Mar 2023 23:29)  T(F): 96.3 (23 Mar 2023 08:36), Max: 98.6 (22 Mar 2023 23:29)  HR: 89 (23 Mar 2023 08:36) (89 - 97)  BP: 125/59 (23 Mar 2023 08:36) (118/58 - 128/60)  BP(mean): 81 (22 Mar 2023 15:35) (81 - 81)  RR: 19 (22 Mar 2023 23:29) (19 - 20)  SpO2: 98% (22 Mar 2023 23:29) (98% - 98%)    Parameters below as of 22 Mar 2023 23:29  Patient On (Oxygen Delivery Method): room air        CONSTITUTIONAL:  NAD  heard of hearing    ENT:   Airway patent,     EYES:   Clear bilaterally,   pupils equal,   round and reactive to light.    CARDIAC:   Normal rate,   regular rhythm.    no edema    CAROTID:   normal systolic impulse  no bruits    RESPIRATORY:   decreased breath sound bilateral    GASTROINTESTINAL:  Abdomen soft,   non-tender,   no guarding,   + BS    MUSCULOSKELETAL:   range of motion is not limited,  no clubbing, cyanosis    NEUROLOGICAL:   Alert and oriented   no motor deficits.    SKIN:   Skin normal color for race,   No evidence of rash.            LABS:                          9.3    9.79  )-----------( 364      ( 23 Mar 2023 06:44 )             28.2                                               03-23    134<L>  |  100  |  52<H>  ----------------------------<  106<H>  3.9   |  23  |  2.2<H>    Ca    8.8      23 Mar 2023 06:44  Mg     1.2     03-23    TPro  5.7<L>  /  Alb  2.4<L>  /  TBili  <0.2  /  DBili  x   /  AST  9   /  ALT  <5  /  AlkPhos  96  03-23                                                 CARDIAC MARKERS ( 22 Mar 2023 12:00 )  x     / 0.03 ng/mL / x     / x     / x      CARDIAC MARKERS ( 22 Mar 2023 09:05 )  x     / 0.04 ng/mL / x     / x     / x      CARDIAC MARKERS ( 21 Mar 2023 19:31 )  x     / 0.04 ng/mL / x     / x     / x                                                LIVER FUNCTIONS - ( 23 Mar 2023 06:44 )  Alb: 2.4 g/dL / Pro: 5.7 g/dL / ALK PHOS: 96 U/L / ALT: <5 U/L / AST: 9 U/L / GGT: x                                                                                                MEDICATIONS  (STANDING):  atorvastatin 20 milliGRAM(s) Oral at bedtime  cefepime   IVPB      cefepime   IVPB 1000 milliGRAM(s) IV Intermittent once  dextrose 5%. 1000 milliLiter(s) (50 mL/Hr) IV Continuous <Continuous>  dextrose 5%. 1000 milliLiter(s) (100 mL/Hr) IV Continuous <Continuous>  dextrose 50% Injectable 25 Gram(s) IV Push once  dextrose 50% Injectable 12.5 Gram(s) IV Push once  dextrose 50% Injectable 25 Gram(s) IV Push once  glucagon  Injectable 1 milliGRAM(s) IntraMuscular once  heparin   Injectable 5000 Unit(s) SubCutaneous every 12 hours  influenza  Vaccine (HIGH DOSE) 0.7 milliLiter(s) IntraMuscular once  insulin lispro (ADMELOG) corrective regimen sliding scale   SubCutaneous three times a day before meals  lactobacillus acidophilus 1 Tablet(s) Oral daily  magnesium sulfate  IVPB 2 Gram(s) IV Intermittent every 2 hours  pantoprazole    Tablet 40 milliGRAM(s) Oral two times a day  polyethylene glycol 3350 17 Gram(s) Oral daily  sucralfate 1 Gram(s) Oral every 6 hours    MEDICATIONS  (PRN):  acetaminophen     Tablet .. 650 milliGRAM(s) Oral every 6 hours PRN Temp greater or equal to 38C (100.4F), Mild Pain (1 - 3)  aluminum hydroxide/magnesium hydroxide/simethicone Suspension 30 milliLiter(s) Oral every 6 hours PRN Dyspepsia  dextrose Oral Gel 15 Gram(s) Oral once PRN Blood Glucose LESS THAN 70 milliGRAM(s)/deciliter

## 2023-03-23 NOTE — CONSULT NOTE ADULT - ATTENDING COMMENTS
Impression:    Sepsis present on admission  Aspiration PNA  Ho PUD  CHRIS on CKD  COPD/emphysema  Acute hypoxemia     Impression and plan are my own.
I have personally seen and examined this patient.  I have fully participated in the care of this patient.  I have reviewed all pertinent clinical information, including history, physical exam, plan and note.  I have reviewed all pertinent clinical information and reviewed all relevant imaging and diagnostic studies personally.  My addendum includes the final recommendations.      #Aspiration PNA with Severe sepsis on admission T<36 WBC 13 lactic acidosis  Recent hospitalization in [place] for PNA  < from: CT Abdomen and Pelvis No Cont (03.21.23 @ 22:01) >   Bilateral lower lobe consolidation, superimposed emphysema,   compatible with pneumonia in the appropriate clinical setting. Occlusion   of peripheral lower lobe bronchi; correlation for aspiration recommended.  #Hyponatremia  #CHRIS    - Continue levaquin 7 days, can be PO same dose on D/C  - Sputum CX

## 2023-03-23 NOTE — PROGRESS NOTE ADULT - SUBJECTIVE AND OBJECTIVE BOX
RAUL LYNN  89y, Female  Allergy: penicillin (Unknown)      LOS  2d    CHIEF COMPLAINT: pneumonia (23 Mar 2023 13:41)      INTERVAL EVENTS/HPI  - No acute events overnight  - T(F): , Max: 98.6 (03-22-23 @ 23:29)  - Denies any worsening symptoms  - Tolerating medication  - WBC Count: 9.79 (03-23-23 @ 06:44)  WBC Count: 12.68 (03-22-23 @ 09:05)     - Creatinine, Serum: 2.2 (03-23-23 @ 06:44)  Creatinine, Serum: 2.5 (03-22-23 @ 12:00)       ROS  General: Denies rigors, nightsweats  HEENT: Denies headache, rhinorrhea, sore throat, eye pain  CV: Denies CP, palpitations  PULM: Denies wheezing, hemoptysis  GI: Denies hematemesis, hematochezia, melena  : Denies discharge, hematuria  MSK: Denies arthralgias, myalgias  SKIN: Denies rash, lesions  NEURO: Denies paresthesias, weakness  PSYCH: Denies depression, anxiety    VITALS:  T(F): 96.3, Max: 98.6 (03-22-23 @ 23:29)  HR: 89  BP: 125/59  RR: 19Vital Signs Last 24 Hrs  T(C): 35.7 (23 Mar 2023 08:36), Max: 37 (22 Mar 2023 23:29)  T(F): 96.3 (23 Mar 2023 08:36), Max: 98.6 (22 Mar 2023 23:29)  HR: 89 (23 Mar 2023 08:36) (89 - 97)  BP: 125/59 (23 Mar 2023 08:36) (118/58 - 128/60)  BP(mean): 81 (22 Mar 2023 15:35) (81 - 81)  RR: 19 (22 Mar 2023 23:29) (19 - 20)  SpO2: 98% (22 Mar 2023 23:29) (98% - 98%)    Parameters below as of 22 Mar 2023 23:29  Patient On (Oxygen Delivery Method): room air        PHYSICAL EXAM:  Gen: NAD, resting in bed Elderly frail F  HEENT: Normocephalic, atraumatic  Neck: supple, no lymphadenopathy  CV: Regular rate & regular rhythm  Lungs: decreased BS at bases, no fremitus  Abdomen: Soft, BS present  Ext: Warm, well perfused  Neuro: non focal, awake  Skin: no rash, no erythema  Lines: no phlebitis    FH: Non-contributory  Social Hx: Non-contributory    TESTS & MEASUREMENTS:                        9.3    9.79  )-----------( 364      ( 23 Mar 2023 06:44 )             28.2     03-23    134<L>  |  100  |  52<H>  ----------------------------<  106<H>  3.9   |  23  |  2.2<H>    Ca    8.8      23 Mar 2023 06:44  Mg     1.2     03-23    TPro  5.7<L>  /  Alb  2.4<L>  /  TBili  <0.2  /  DBili  x   /  AST  9   /  ALT  <5  /  AlkPhos  96  03-23      LIVER FUNCTIONS - ( 23 Mar 2023 06:44 )  Alb: 2.4 g/dL / Pro: 5.7 g/dL / ALK PHOS: 96 U/L / ALT: <5 U/L / AST: 9 U/L / GGT: x                 Lactate, Blood: 1.8 mmol/L (03-22-23 @ 20:14)  Lactate, Blood: 1.6 mmol/L (03-22-23 @ 17:49)  Lactate, Blood: 2.2 mmol/L (03-22-23 @ 12:00)  Lactate, Blood: 1.5 mmol/L (03-22-23 @ 09:05)  Lactate, Blood: 2.4 mmol/L (03-21-23 @ 19:31)      INFECTIOUS DISEASES TESTING  COVID-19 PCR: NotDetec (03-22-23 @ 10:50)  Procalcitonin, Serum: 0.25 (03-22-23 @ 09:05)      INFLAMMATORY MARKERS      RADIOLOGY & ADDITIONAL TESTS:  I have personally reviewed the last available Chest xray  CXR      CT  CT Abdomen and Pelvis No Cont:   ACC: [id number] EXAM:  CT ABDOMEN AND PELVIS   ORDERED BY: MIRI FLORES     PROCEDURE DATE:  03/21/2023          INTERPRETATION:  CLINICAL STATEMENT: Abdominal pain.    TECHNIQUE: Contiguous axial CT images were obtained from the lower chest   to thepubic symphysis without intravenous contrast.  Oral contrast was   not administered.  Reformatted images in the coronal and sagittal planes   were acquired.    Comparison: None.    FINDINGS:    LOWER CHEST: Bilateral lower lobe consolidation, superimposed emphysema,   compatible with pneumonia in the appropriate clinical setting. Occlusion   of peripheral lower lobe bronchi; correlation for aspiration recommended.    HEPATOBILIARY: Cholelithiasis. Liver unremarkable. No biliary dilation.    SPLEEN: Unremarkable.    PANCREAS: Unremarkable.    ADRENAL GLANDS: Unremarkable.    KIDNEYS: No hydronephrosis. Bilateral renal cysts, not completely   evaluated. Nonobstructing left renal calculi or vascular calcifications.    ABDOMINOPELVIC NODES: Unremarkable.    PELVIC ORGANS: Unremarkable.    PERITONEUM/MESENTERY/BOWEL: Colonic diverticulosis. No ascites. No bowel   obstruction. No pneumoperitoneum.    BONES/SOFT TISSUES: Osteopenia. Degenerative change of spine.    OTHER: Vascular calcifications.      IMPRESSION:    Bilateral lower lobe consolidation, superimposed emphysema, compatible   with pneumonia in the appropriate clinical setting.    Occlusion of peripheral lower lobe bronchi; correlation for aspiration   recommended.    --- End of Report ---            DELMA RAMIREZ MD; Attending Radiologist  This document has been electronically signed. Mar 21 2023 10:07PM (03-21-23 @ 22:01)      CARDIOLOGY TESTING  12 Lead ECG:   Ventricular Rate 98 BPM    Atrial Rate 98 BPM    P-R Interval 154 ms    QRS Duration 80 ms    Q-T Interval 328 ms    QTC Calculation(Bazett) 418 ms    P Axis 75 degrees    R Axis 73 degrees    T Axis 68 degrees    Diagnosis Line Normal sinus rhythm  Normal ECG    Confirmed by CHRISSY FRIEDMAN MD ([id number]) on 3/22/2023 6:45:43 AM (03-21-23 @ 21:52)      MEDICATIONS  atorvastatin 20 Oral at bedtime  cefepime   IVPB     cefepime   IVPB 1000 IV Intermittent once  dextrose 5%. 1000 IV Continuous <Continuous>  dextrose 5%. 1000 IV Continuous <Continuous>  dextrose 50% Injectable 25 IV Push once  dextrose 50% Injectable 12.5 IV Push once  dextrose 50% Injectable 25 IV Push once  glucagon  Injectable 1 IntraMuscular once  heparin   Injectable 5000 SubCutaneous every 12 hours  influenza  Vaccine (HIGH DOSE) 0.7 IntraMuscular once  insulin lispro (ADMELOG) corrective regimen sliding scale  SubCutaneous three times a day before meals  lactobacillus acidophilus 1 Oral daily  pantoprazole    Tablet 40 Oral two times a day  polyethylene glycol 3350 17 Oral daily  sucralfate 1 Oral every 6 hours      WEIGHT  Weight (kg): 43.1 (03-22-23 @ 13:03)  Creatinine, Serum: 2.2 mg/dL (03-23-23 @ 06:44)      ANTIBIOTICS:  cefepime   IVPB      cefepime   IVPB 1000 milliGRAM(s) IV Intermittent once      All available historical records have been reviewed

## 2023-03-23 NOTE — CONSULT NOTE ADULT - ASSESSMENT
89 year old female withPMHx of HTN, DM, CKD presents to ed with epigastric abdominal pain. Pt is [place] and limited historian, further hx was obtained from daughter Chanelle. Per daughter, pt recently moved to [place] from [place] 2 months ago and was admitted to hospital in [place] last month with pneumonia and discharged to rehab.    Impression      Plan  - f/u as OP for PFTs Impression:    Sepsis present on admission  Aspiration PNA  Ho PUD  CHRIS on CKD        Plan:  Procal noted 0.25  Unasyn  PO Levaquin on discharge  Nebs as needed  Wean oxygen   Target oxygen 88-92  D-dimer  Swallow eval noted  Keep equal balance  Outpatient PFT Impression:    Sepsis present on admission  Aspiration PNA  Ho PUD  CHRIS on CKD  COPD/emphysema  Acute hypoxemia       Plan:    CT chest noted with bibasilar consolidations; extensive emphysema as well   Likely aspirating and findings represent aspiration pneumonias  Procal noted 0.25; follow up on culture results: blood, urine, urine strep and legionella. Check nasal MRSA  Recommend Rocephin and Doxy and finish 5 day course  She is out of the window for LDCT for lung cancer screening due to her age   She is also unlikely to be able to perform PFTs   She has significant emphysema on CT and likely significant COPD   Start Symbicort and spiriva; albuterol as needed   Continue O2 via nasal radha and keep spo2 more than 88%   Check duplex of the lower extremities   Clarify goals of care with the family   Willf ollow as neede  Impression:    Sepsis present on admission  Aspiration PNA  Ho PUD  CHRIS on CKD  COPD/emphysema  Acute hypoxemia       Plan:    CT chest noted with bibasilar consolidations; extensive emphysema as well   Likely aspirating and findings represent aspiration pneumonias  Procal noted 0.25; follow up on culture results: blood, urine, urine strep and legionella. Check nasal MRSA  Recommend Rocephin and Doxy and finish 5 day course  She is out of the window for LDCT for lung cancer screening due to her age   She is also unlikely to be able to perform PFTs   She has significant emphysema on CT and likely significant COPD   Start Symbicort and spiriva; albuterol as needed   Continue O2 via nasal radha and keep spo2 more than 88%   Check duplex of the lower extremities   Clarify goals of care with the family   Will follow as neede

## 2023-03-23 NOTE — PROGRESS NOTE ADULT - SUBJECTIVE AND OBJECTIVE BOX
RAUL LYNN 89y Female  MRN#: [id number]     Hospital Day: 2d    Pt is currently admitted with the primary diagnosis of DIARRHEA ; [facility]    Overnight events   -No major overnight events                                          ----------------------------------------------------------  OBJECTIVE  PAST MEDICAL & SURGICAL HISTORY  Hypertension    Diabetes mellitus    Chronic kidney disease, unspecified CKD stage                                              -----------------------------------------------------------  ALLERGIES:  penicillin (Unknown)                                            ------------------------------------------------------------    HOME MEDICATIONS  Home Medications:  amLODIPine 2.5 mg oral tablet: 1 tab(s) orally once a day (22 Mar 2023 01:48)  Lipitor 20 mg oral tablet: 1 tab(s) orally once a day (22 Mar 2023 01:49)  lisinopril-hydrochlorothiazide 10 mg-12.5 mg oral tablet: 1 tab(s) orally once a day (22 Mar 2023 01:48)  pioglitazone 15 mg oral tablet: 1 tab(s) orally once a day (22 Mar 2023 01:49)  Protonix 40 mg oral delayed release tablet: 1 tab(s) orally once a day (22 Mar 2023 01:49)                           MEDICATIONS:  STANDING MEDICATIONS  atorvastatin 20 milliGRAM(s) Oral at bedtime  cefepime   IVPB      cefepime   IVPB 1000 milliGRAM(s) IV Intermittent once  dextrose 5%. 1000 milliLiter(s) IV Continuous <Continuous>  dextrose 5%. 1000 milliLiter(s) IV Continuous <Continuous>  dextrose 50% Injectable 25 Gram(s) IV Push once  dextrose 50% Injectable 12.5 Gram(s) IV Push once  dextrose 50% Injectable 25 Gram(s) IV Push once  glucagon  Injectable 1 milliGRAM(s) IntraMuscular once  heparin   Injectable 5000 Unit(s) SubCutaneous every 12 hours  influenza  Vaccine (HIGH DOSE) 0.7 milliLiter(s) IntraMuscular once  insulin lispro (ADMELOG) corrective regimen sliding scale   SubCutaneous three times a day before meals  lactobacillus acidophilus 1 Tablet(s) Oral daily  magnesium sulfate  IVPB 2 Gram(s) IV Intermittent every 2 hours  pantoprazole    Tablet 40 milliGRAM(s) Oral two times a day  polyethylene glycol 3350 17 Gram(s) Oral daily  sodium chloride 0.9%. 1000 milliLiter(s) IV Continuous <Continuous>  sucralfate 1 Gram(s) Oral every 6 hours    PRN MEDICATIONS  dextrose Oral Gel 15 Gram(s) Oral once PRN                                            ------------------------------------------------------------  VITAL SIGNS: Last 24 Hours  T(C): 35.7 (23 Mar 2023 08:36), Max: 37 (22 Mar 2023 23:29)  T(F): 96.3 (23 Mar 2023 08:36), Max: 98.6 (22 Mar 2023 23:29)  HR: 89 (23 Mar 2023 08:36) (89 - 97)  BP: 125/59 (23 Mar 2023 08:36) (118/58 - 128/60)  BP(mean): 81 (22 Mar 2023 15:35) (81 - 81)  RR: 19 (22 Mar 2023 23:29) (19 - 20)  SpO2: 98% (22 Mar 2023 23:29) (98% - 98%)      03-22-23 @ 07:01  -  03-23-23 @ 07:00  --------------------------------------------------------  IN: 100 mL / OUT: 1300 mL / NET: -1200 mL                                             --------------------------------------------------------------  LABS:                        9.3    9.79  )-----------( 364      ( 23 Mar 2023 06:44 )             28.2     03-23    134<L>  |  100  |  52<H>  ----------------------------<  106<H>  3.9   |  23  |  2.2<H>    Ca    8.8      23 Mar 2023 06:44  Mg     1.2     03-23    TPro  5.7<L>  /  Alb  2.4<L>  /  TBili  <0.2  /  DBili  x   /  AST  9   /  ALT  <5  /  AlkPhos  96  03-23          Lactate, Blood: 1.8 mmol/L (03-22-23 @ 20:14)  Lactate, Blood: 1.6 mmol/L (03-22-23 @ 17:49)  Lactate, Blood: 2.2 mmol/L *H* (03-22-23 @ 12:00)  Troponin T, Serum: 0.03 ng/mL *HH* (03-22-23 @ 12:00)          CARDIAC MARKERS ( 22 Mar 2023 12:00 )  x     / 0.03 ng/mL / x     / x     / x      CARDIAC MARKERS ( 22 Mar 2023 09:05 )  x     / 0.04 ng/mL / x     / x     / x      CARDIAC MARKERS ( 21 Mar 2023 19:31 )  x     / 0.04 ng/mL / x     / x     / x                                                    --------------------------------------------------------------    PHYSICAL EXAM:  GENERAL: Awake, alert, oriented to person, place, time, situation. Underweight, laying in bed appearing in no acute distress  HEENT: No FNDs, atraumatic, normocephalic  LUNGS: RLL crackles, poor inspiratory effort  HEART: S1/S2. No heaves or thrills  ABD: Soft, non-tender, non-distended.  EXT/NEURO: Strength, sensation and ROM grossly intact.  SKIN: No edema      PLAN:  89 year old female withPMHx of HTN, DM, CKD presents to ed with epigastric abdominal pain. Pt is [place] and limited historian, further hx was obtained from daughter Chanelle. Per daughter, pt recently moved to [place] from [place] 2 months ago and was admitted to hospital in [place] last month with pneumonia and discharged to rehab where she was complaining of burning stomach pain and was discharged on Protonix (endoscopy was denied because of her age) Pt had a nephrologist in [place] per daughter but does not remember the patient's baseline kidney function. Denies chest pain, nausea, vomiting, SOB.    #Severe Sepsis, resolved  #HCAP  #Lactic Acidosis, resolved  - WBC 13K, Tmin 95.6; on RA  - CXR shows b/l patchy opacities + chronic lung disease pattern  - CT A/P showed Bilateral lower lobe consolidation, occlusion of peripheral lower lobe bronchi suggestive of aspiration  -procal 0.25  - F/u RVP panel, Strep ur Ag, Legionella Ur Ag, MRSA swab, BCx, sputum Cx  -ID: levaquin/flagyl to cefepime    #Diarrhea  #Severe Hypomagnesemia  -per pt started after outpt abx use (levaquin/flagyl)  -c/w PNA txt with cefepime  -GI PCR, f/u for resolution  -replete Mg as needed, likely secondary to GI losses    #Dyspepsia  #Peptic Ulcer Disease  - was taking Protonix 40mg daily at home with no response  - c/w protonix BID (improved with this)  - endoscopy was not performed before because of her advanced age  - GI f/u OP    #CHRIS Vs progressive CKD, resolving  #Hypertonic hyponatremia  - Cr 2.7 (unknown baseline)  -RBUS- previous renal cysts similar, 429cc in bladder, no hydro  -f/u post void bladder scan  - F/u urine lytes    #Troponinemia, resolving - likely 2/2 kidney dysfunction  - trops 0.04-->0.03  - no chest pain  - EKG showed no ischemic changes    #Normocytic Anemia - 2/2 CKD?  - HgB 10.4 (unknown baseline)  - MCV 88  - F/u iron studies, b12, folate    #HTN  - takes amlodipine 2.5 mg daily, HCTZ 12.5mg daily and lisinopril 10mg daily  - hold anti-HTNsive meds for now as BP is soft and restart as needed    #DM  - takes pioglitazone 15mg daily  - c/w insulin lantus 8U and sliding scale    # DVT prophylaxis   -HSQ    # GI prophylaxis   -PROTNIX BID    # Diet   -soft, DASH/TLC    # Activity Score (AM-PAC)    #Progress Note Handoff  Pending (specify): diarrhea resolution, PNA monitoring  Family discussion:   Disposition:   -24hrs      RAUL LYNN 89y Female  MRN#: [id number]     Hospital Day: 2d    Pt is currently admitted with the primary diagnosis of DIARRHEA ; [facility]    Overnight events   -No major overnight events                                          ----------------------------------------------------------  OBJECTIVE  PAST MEDICAL & SURGICAL HISTORY  Hypertension    Diabetes mellitus    Chronic kidney disease, unspecified CKD stage                                              -----------------------------------------------------------  ALLERGIES:  penicillin (Unknown)                                            ------------------------------------------------------------    HOME MEDICATIONS  Home Medications:  amLODIPine 2.5 mg oral tablet: 1 tab(s) orally once a day (22 Mar 2023 01:48)  Lipitor 20 mg oral tablet: 1 tab(s) orally once a day (22 Mar 2023 01:49)  lisinopril-hydrochlorothiazide 10 mg-12.5 mg oral tablet: 1 tab(s) orally once a day (22 Mar 2023 01:48)  pioglitazone 15 mg oral tablet: 1 tab(s) orally once a day (22 Mar 2023 01:49)  Protonix 40 mg oral delayed release tablet: 1 tab(s) orally once a day (22 Mar 2023 01:49)                           MEDICATIONS:  STANDING MEDICATIONS  atorvastatin 20 milliGRAM(s) Oral at bedtime  cefepime   IVPB      cefepime   IVPB 1000 milliGRAM(s) IV Intermittent once  dextrose 5%. 1000 milliLiter(s) IV Continuous <Continuous>  dextrose 5%. 1000 milliLiter(s) IV Continuous <Continuous>  dextrose 50% Injectable 25 Gram(s) IV Push once  dextrose 50% Injectable 12.5 Gram(s) IV Push once  dextrose 50% Injectable 25 Gram(s) IV Push once  glucagon  Injectable 1 milliGRAM(s) IntraMuscular once  heparin   Injectable 5000 Unit(s) SubCutaneous every 12 hours  influenza  Vaccine (HIGH DOSE) 0.7 milliLiter(s) IntraMuscular once  insulin lispro (ADMELOG) corrective regimen sliding scale   SubCutaneous three times a day before meals  lactobacillus acidophilus 1 Tablet(s) Oral daily  magnesium sulfate  IVPB 2 Gram(s) IV Intermittent every 2 hours  pantoprazole    Tablet 40 milliGRAM(s) Oral two times a day  polyethylene glycol 3350 17 Gram(s) Oral daily  sodium chloride 0.9%. 1000 milliLiter(s) IV Continuous <Continuous>  sucralfate 1 Gram(s) Oral every 6 hours    PRN MEDICATIONS  dextrose Oral Gel 15 Gram(s) Oral once PRN                                            ------------------------------------------------------------  VITAL SIGNS: Last 24 Hours  T(C): 35.7 (23 Mar 2023 08:36), Max: 37 (22 Mar 2023 23:29)  T(F): 96.3 (23 Mar 2023 08:36), Max: 98.6 (22 Mar 2023 23:29)  HR: 89 (23 Mar 2023 08:36) (89 - 97)  BP: 125/59 (23 Mar 2023 08:36) (118/58 - 128/60)  BP(mean): 81 (22 Mar 2023 15:35) (81 - 81)  RR: 19 (22 Mar 2023 23:29) (19 - 20)  SpO2: 98% (22 Mar 2023 23:29) (98% - 98%)      03-22-23 @ 07:01  -  03-23-23 @ 07:00  --------------------------------------------------------  IN: 100 mL / OUT: 1300 mL / NET: -1200 mL                                             --------------------------------------------------------------  LABS:                        9.3    9.79  )-----------( 364      ( 23 Mar 2023 06:44 )             28.2     03-23    134<L>  |  100  |  52<H>  ----------------------------<  106<H>  3.9   |  23  |  2.2<H>    Ca    8.8      23 Mar 2023 06:44  Mg     1.2     03-23    TPro  5.7<L>  /  Alb  2.4<L>  /  TBili  <0.2  /  DBili  x   /  AST  9   /  ALT  <5  /  AlkPhos  96  03-23          Lactate, Blood: 1.8 mmol/L (03-22-23 @ 20:14)  Lactate, Blood: 1.6 mmol/L (03-22-23 @ 17:49)  Lactate, Blood: 2.2 mmol/L *H* (03-22-23 @ 12:00)  Troponin T, Serum: 0.03 ng/mL *HH* (03-22-23 @ 12:00)          CARDIAC MARKERS ( 22 Mar 2023 12:00 )  x     / 0.03 ng/mL / x     / x     / x      CARDIAC MARKERS ( 22 Mar 2023 09:05 )  x     / 0.04 ng/mL / x     / x     / x      CARDIAC MARKERS ( 21 Mar 2023 19:31 )  x     / 0.04 ng/mL / x     / x     / x                                                    --------------------------------------------------------------    PHYSICAL EXAM:  GENERAL: Awake, alert, oriented to person, place, time, situation. Underweight, laying in bed appearing in no acute distress  HEENT: No FNDs, atraumatic, normocephalic  LUNGS: RLL crackles, poor inspiratory effort  HEART: S1/S2. No heaves or thrills  ABD: Soft, non-tender, non-distended.  EXT/NEURO: Strength, sensation and ROM grossly intact.  SKIN: No edema      PLAN:  89 year old female withPMHx of HTN, DM, CKD presents to ed with epigastric abdominal pain. Pt is [place] and limited historian, further hx was obtained from daughter Chanelle. Per daughter, pt recently moved to [place] from [place] 2 months ago and was admitted to hospital in [place] last month with pneumonia and discharged to rehab where she was complaining of burning stomach pain and was discharged on Protonix (endoscopy was denied because of her age) Pt had a nephrologist in [place] per daughter but does not remember the patient's baseline kidney function. Denies chest pain, nausea, vomiting, SOB.    #Severe Sepsis, resolved  #HCAP  #Lactic Acidosis, resolved  - WBC 13K, Tmin 95.6; on RA  - CXR shows b/l patchy opacities + chronic lung disease pattern  - CT A/P showed Bilateral lower lobe consolidation, occlusion of peripheral lower lobe bronchi suggestive of aspiration  -procal 0.25  - F/u RVP panel, Strep ur Ag, Legionella Ur Ag, MRSA swab, BCx, sputum Cx  -ID: levaquin/flagyl to cefepime    #Diarrhea  #Severe Hypomagnesemia  -intermittent constipation/diarrhea, recently diarrhea and apparent ABx use recently  -c/w PNA txt with cefepime  -GI PCR, f/u for resolution  -replete Mg as needed, likely secondary to GI losses    #Dyspepsia  #Peptic Ulcer Disease  - was taking Protonix 40mg daily at home with no response  - c/w protonix BID (improved with this)  - endoscopy was not performed before because of her advanced age  - GI f/u OP    #CHRIS Vs progressive CKD, resolving  #Hypertonic hyponatremia  - Cr 2.7 (unknown baseline)  -RBUS- previous renal cysts similar, 429cc in bladder, no hydro  -f/u post void bladder scan  - F/u urine lytes    #Troponinemia, resolving - likely 2/2 kidney dysfunction  - trops 0.04-->0.03  - no chest pain  - EKG showed no ischemic changes    #Normocytic Anemia - 2/2 CKD?  - HgB 10.4 (unknown baseline)  - MCV 88  - F/u iron studies, b12, folate    #HTN  - takes amlodipine 2.5 mg daily, HCTZ 12.5mg daily and lisinopril 10mg daily  - hold anti-HTNsive meds for now as BP is soft and restart as needed    #DM  - takes pioglitazone 15mg daily  - c/w insulin lantus 8U and sliding scale    # DVT prophylaxis   -HSQ    # GI prophylaxis   -PROTNIX BID    # Diet   -soft, DASH/TLC    # Activity Score (AM-PAC)    #Progress Note Handoff  Pending (specify): diarrhea resolution, PNA monitoring  Family discussion:   Disposition:   -24hrs

## 2023-03-23 NOTE — PHYSICAL THERAPY INITIAL EVALUATION ADULT - SPECIFY REASON(S)
PT evaluation attempted. Patient currently had multiple diarrhea. With exertion or movement, diarrhea is stimulated. PT eval on hold. Will follow-up,

## 2023-03-24 LAB
ALBUMIN SERPL ELPH-MCNC: 2.6 G/DL — LOW (ref 3.5–5.2)
ALP SERPL-CCNC: 117 U/L — HIGH (ref 30–115)
ALT FLD-CCNC: <5 U/L — SIGNIFICANT CHANGE UP (ref 0–41)
ANION GAP SERPL CALC-SCNC: 11 MMOL/L — SIGNIFICANT CHANGE UP (ref 7–14)
AST SERPL-CCNC: 9 U/L — SIGNIFICANT CHANGE UP (ref 0–41)
BASOPHILS # BLD AUTO: 0.05 K/UL — SIGNIFICANT CHANGE UP (ref 0–0.2)
BASOPHILS NFR BLD AUTO: 0.5 % — SIGNIFICANT CHANGE UP (ref 0–1)
BILIRUB SERPL-MCNC: <0.2 MG/DL — SIGNIFICANT CHANGE UP (ref 0.2–1.2)
BUN SERPL-MCNC: 42 MG/DL — HIGH (ref 10–20)
CALCIUM SERPL-MCNC: 9.5 MG/DL — SIGNIFICANT CHANGE UP (ref 8.4–10.5)
CHLORIDE SERPL-SCNC: 99 MMOL/L — SIGNIFICANT CHANGE UP (ref 98–110)
CO2 SERPL-SCNC: 23 MMOL/L — SIGNIFICANT CHANGE UP (ref 17–32)
CREAT SERPL-MCNC: 1.8 MG/DL — HIGH (ref 0.7–1.5)
EGFR: 26 ML/MIN/1.73M2 — LOW
EOSINOPHIL # BLD AUTO: 0.73 K/UL — HIGH (ref 0–0.7)
EOSINOPHIL NFR BLD AUTO: 7.2 % — SIGNIFICANT CHANGE UP (ref 0–8)
GI PCR PANEL: SIGNIFICANT CHANGE UP
GLUCOSE BLDC GLUCOMTR-MCNC: 101 MG/DL — HIGH (ref 70–99)
GLUCOSE BLDC GLUCOMTR-MCNC: 176 MG/DL — HIGH (ref 70–99)
GLUCOSE BLDC GLUCOMTR-MCNC: 191 MG/DL — HIGH (ref 70–99)
GLUCOSE BLDC GLUCOMTR-MCNC: 203 MG/DL — HIGH (ref 70–99)
GLUCOSE BLDC GLUCOMTR-MCNC: 213 MG/DL — HIGH (ref 70–99)
GLUCOSE SERPL-MCNC: 186 MG/DL — HIGH (ref 70–99)
HCT VFR BLD CALC: 31 % — LOW (ref 37–47)
HGB BLD-MCNC: 10.2 G/DL — LOW (ref 12–16)
IMM GRANULOCYTES NFR BLD AUTO: 1.1 % — HIGH (ref 0.1–0.3)
LEGIONELLA AG UR QL: NEGATIVE — SIGNIFICANT CHANGE UP
LYMPHOCYTES # BLD AUTO: 1.91 K/UL — SIGNIFICANT CHANGE UP (ref 1.2–3.4)
LYMPHOCYTES # BLD AUTO: 18.7 % — LOW (ref 20.5–51.1)
MAGNESIUM SERPL-MCNC: 2.6 MG/DL — HIGH (ref 1.8–2.4)
MCHC RBC-ENTMCNC: 28.8 PG — SIGNIFICANT CHANGE UP (ref 27–31)
MCHC RBC-ENTMCNC: 32.9 G/DL — SIGNIFICANT CHANGE UP (ref 32–37)
MCV RBC AUTO: 87.6 FL — SIGNIFICANT CHANGE UP (ref 81–99)
MONOCYTES # BLD AUTO: 0.45 K/UL — SIGNIFICANT CHANGE UP (ref 0.1–0.6)
MONOCYTES NFR BLD AUTO: 4.4 % — SIGNIFICANT CHANGE UP (ref 1.7–9.3)
NEUTROPHILS # BLD AUTO: 6.95 K/UL — HIGH (ref 1.4–6.5)
NEUTROPHILS NFR BLD AUTO: 68.1 % — SIGNIFICANT CHANGE UP (ref 42.2–75.2)
NRBC # BLD: 0 /100 WBCS — SIGNIFICANT CHANGE UP (ref 0–0)
PLATELET # BLD AUTO: 408 K/UL — HIGH (ref 130–400)
POTASSIUM SERPL-MCNC: 4 MMOL/L — SIGNIFICANT CHANGE UP (ref 3.5–5)
POTASSIUM SERPL-SCNC: 4 MMOL/L — SIGNIFICANT CHANGE UP (ref 3.5–5)
PROT SERPL-MCNC: 6.1 G/DL — SIGNIFICANT CHANGE UP (ref 6–8)
RAPID RVP RESULT: SIGNIFICANT CHANGE UP
RBC # BLD: 3.54 M/UL — LOW (ref 4.2–5.4)
RBC # FLD: 15.8 % — HIGH (ref 11.5–14.5)
S PNEUM AG UR QL: NEGATIVE — SIGNIFICANT CHANGE UP
SARS-COV-2 RNA SPEC QL NAA+PROBE: SIGNIFICANT CHANGE UP
SODIUM SERPL-SCNC: 133 MMOL/L — LOW (ref 135–146)
WBC # BLD: 10.2 K/UL — SIGNIFICANT CHANGE UP (ref 4.8–10.8)
WBC # FLD AUTO: 10.2 K/UL — SIGNIFICANT CHANGE UP (ref 4.8–10.8)

## 2023-03-24 PROCEDURE — 93970 EXTREMITY STUDY: CPT | Mod: 26

## 2023-03-24 PROCEDURE — 99232 SBSQ HOSP IP/OBS MODERATE 35: CPT

## 2023-03-24 RX ORDER — DEXTROSE 50 % IN WATER 50 %
25 SYRINGE (ML) INTRAVENOUS ONCE
Refills: 0 | Status: DISCONTINUED | OUTPATIENT
Start: 2023-03-24 | End: 2023-03-25

## 2023-03-24 RX ORDER — DEXTROSE 50 % IN WATER 50 %
15 SYRINGE (ML) INTRAVENOUS ONCE
Refills: 0 | Status: DISCONTINUED | OUTPATIENT
Start: 2023-03-24 | End: 2023-03-25

## 2023-03-24 RX ORDER — LOPERAMIDE HCL 2 MG
2 TABLET ORAL
Refills: 0 | Status: DISCONTINUED | OUTPATIENT
Start: 2023-03-24 | End: 2023-03-25

## 2023-03-24 RX ORDER — INSULIN GLARGINE 100 [IU]/ML
6 INJECTION, SOLUTION SUBCUTANEOUS AT BEDTIME
Refills: 0 | Status: DISCONTINUED | OUTPATIENT
Start: 2023-03-24 | End: 2023-03-24

## 2023-03-24 RX ORDER — INSULIN LISPRO 100/ML
VIAL (ML) SUBCUTANEOUS
Refills: 0 | Status: DISCONTINUED | OUTPATIENT
Start: 2023-03-24 | End: 2023-03-25

## 2023-03-24 RX ORDER — SIMETHICONE 80 MG/1
80 TABLET, CHEWABLE ORAL THREE TIMES A DAY
Refills: 0 | Status: DISCONTINUED | OUTPATIENT
Start: 2023-03-24 | End: 2023-03-25

## 2023-03-24 RX ORDER — SODIUM CHLORIDE 9 MG/ML
1000 INJECTION, SOLUTION INTRAVENOUS
Refills: 0 | Status: DISCONTINUED | OUTPATIENT
Start: 2023-03-24 | End: 2023-03-25

## 2023-03-24 RX ORDER — CEFPODOXIME PROXETIL 100 MG
200 TABLET ORAL DAILY
Refills: 0 | Status: DISCONTINUED | OUTPATIENT
Start: 2023-03-24 | End: 2023-03-25

## 2023-03-24 RX ORDER — DEXTROSE 50 % IN WATER 50 %
12.5 SYRINGE (ML) INTRAVENOUS ONCE
Refills: 0 | Status: DISCONTINUED | OUTPATIENT
Start: 2023-03-24 | End: 2023-03-25

## 2023-03-24 RX ORDER — GLUCAGON INJECTION, SOLUTION 0.5 MG/.1ML
1 INJECTION, SOLUTION SUBCUTANEOUS ONCE
Refills: 0 | Status: DISCONTINUED | OUTPATIENT
Start: 2023-03-24 | End: 2023-03-25

## 2023-03-24 RX ORDER — INSULIN GLARGINE 100 [IU]/ML
6 INJECTION, SOLUTION SUBCUTANEOUS ONCE
Refills: 0 | Status: COMPLETED | OUTPATIENT
Start: 2023-03-24 | End: 2023-03-24

## 2023-03-24 RX ORDER — INSULIN GLARGINE 100 [IU]/ML
6 INJECTION, SOLUTION SUBCUTANEOUS AT BEDTIME
Refills: 0 | Status: DISCONTINUED | OUTPATIENT
Start: 2023-03-24 | End: 2023-03-25

## 2023-03-24 RX ADMIN — Medication 5 MILLILITER(S): at 05:26

## 2023-03-24 RX ADMIN — Medication 4: at 16:55

## 2023-03-24 RX ADMIN — Medication 1 GRAM(S): at 12:02

## 2023-03-24 RX ADMIN — Medication 200 MILLIGRAM(S): at 17:17

## 2023-03-24 RX ADMIN — Medication 1 TABLET(S): at 13:15

## 2023-03-24 RX ADMIN — PANTOPRAZOLE SODIUM 40 MILLIGRAM(S): 20 TABLET, DELAYED RELEASE ORAL at 05:26

## 2023-03-24 RX ADMIN — TIOTROPIUM BROMIDE 2 PUFF(S): 18 CAPSULE ORAL; RESPIRATORY (INHALATION) at 08:50

## 2023-03-24 RX ADMIN — PANTOPRAZOLE SODIUM 40 MILLIGRAM(S): 20 TABLET, DELAYED RELEASE ORAL at 17:24

## 2023-03-24 RX ADMIN — INSULIN GLARGINE 6 UNIT(S): 100 INJECTION, SOLUTION SUBCUTANEOUS at 21:13

## 2023-03-24 RX ADMIN — Medication 5 MILLILITER(S): at 12:00

## 2023-03-24 RX ADMIN — HEPARIN SODIUM 5000 UNIT(S): 5000 INJECTION INTRAVENOUS; SUBCUTANEOUS at 05:26

## 2023-03-24 RX ADMIN — ATORVASTATIN CALCIUM 20 MILLIGRAM(S): 80 TABLET, FILM COATED ORAL at 21:01

## 2023-03-24 RX ADMIN — BUDESONIDE AND FORMOTEROL FUMARATE DIHYDRATE 2 PUFF(S): 160; 4.5 AEROSOL RESPIRATORY (INHALATION) at 20:02

## 2023-03-24 RX ADMIN — HEPARIN SODIUM 5000 UNIT(S): 5000 INJECTION INTRAVENOUS; SUBCUTANEOUS at 17:16

## 2023-03-24 RX ADMIN — CEFEPIME 100 MILLIGRAM(S): 1 INJECTION, POWDER, FOR SOLUTION INTRAMUSCULAR; INTRAVENOUS at 10:05

## 2023-03-24 RX ADMIN — Medication 4: at 08:36

## 2023-03-24 RX ADMIN — Medication 2 MILLIGRAM(S): at 10:53

## 2023-03-24 RX ADMIN — Medication 1 GRAM(S): at 05:26

## 2023-03-24 RX ADMIN — BUDESONIDE AND FORMOTEROL FUMARATE DIHYDRATE 2 PUFF(S): 160; 4.5 AEROSOL RESPIRATORY (INHALATION) at 08:52

## 2023-03-24 RX ADMIN — INSULIN GLARGINE 6 UNIT(S): 100 INJECTION, SOLUTION SUBCUTANEOUS at 12:00

## 2023-03-24 RX ADMIN — Medication 5 MILLILITER(S): at 17:16

## 2023-03-24 RX ADMIN — Medication 1 GRAM(S): at 17:25

## 2023-03-24 NOTE — DIETITIAN INITIAL EVALUATION ADULT - ORAL NUTRITION SUPPLEMENTS
RECOMMEND ensure plus high protein oral supplement 2x/day  (provides 350 kcals + 20 gm protein per serving)

## 2023-03-24 NOTE — PROGRESS NOTE ADULT - SUBJECTIVE AND OBJECTIVE BOX
RAUL LYNN  89y, Female  Allergy: penicillin (Unknown)      LOS  3d    CHIEF COMPLAINT: pneumonia (24 Mar 2023 12:38)      INTERVAL EVENTS/HPI  - No acute events overnight- continued abd pain   - T(F): , Max: 98.4 (23 @ 17:34)  - Denies any worsening symptoms  - Tolerating medication  - WBC Count: 10.20 (23 @ 07:27)  WBC Count: 9.79 (23 @ 06:44)     - Creatinine, Serum: 1.8 (23 @ 07:27)  Creatinine, Serum: 2.2 (23 @ 06:44)       ROS  General: Denies rigors, nightsweats  HEENT: Denies headache, rhinorrhea, sore throat, eye pain  CV: Denies CP, palpitations  PULM: Denies wheezing, hemoptysis  GI: Denies hematemesis, hematochezia, melena  : Denies discharge, hematuria  MSK: Denies arthralgias, myalgias  SKIN: Denies rash, lesions  NEURO: Denies paresthesias, weakness  PSYCH: Denies depression, anxiety    VITALS:  T(F): 96.7, Max: 98.4 (23 @ 17:34)  HR: 87  BP: 151/70  RR: 18Vital Signs Last 24 Hrs  T(C): 35.9 (24 Mar 2023 08:00), Max: 36.9 (23 Mar 2023 17:34)  T(F): 96.7 (24 Mar 2023 08:00), Max: 98.4 (23 Mar 2023 17:34)  HR: 87 (24 Mar 2023 08:00) (87 - 92)  BP: 151/70 (24 Mar 2023 08:00) (116/56 - 151/70)  BP(mean): --  RR: 18 (24 Mar 2023 08:00) (18 - 19)  SpO2: 98% (24 Mar 2023 08:00) (98% - 98%)    Parameters below as of 24 Mar 2023 08:00  Patient On (Oxygen Delivery Method): nasal cannula  O2 Flow (L/min): 2      PHYSICAL EXAM:  Gen: NAD, resting in bed Elderly F  HEENT: Normocephalic, atraumatic  Neck: supple, no lymphadenopathy  CV: Regular rate & regular rhythm  Lungs: decreased BS at bases, no fremitus  Abdomen: Soft, BS present  Ext: Warm, well perfused  Neuro: non focal, awake  Skin: no rash, no erythema  Lines: no phlebitis    FH: Non-contributory  Social Hx: Non-contributory    TESTS & MEASUREMENTS:                        10.2   10.20 )-----------( 408      ( 24 Mar 2023 07:27 )             31.0         133<L>  |  99  |  42<H>  ----------------------------<  186<H>  4.0   |  23  |  1.8<H>    Ca    9.5      24 Mar 2023 07:27  Mg     2.6         TPro  6.1  /  Alb  2.6<L>  /  TBili  <0.2  /  DBili  x   /  AST  9   /  ALT  <5  /  AlkPhos  117<H>        LIVER FUNCTIONS - ( 24 Mar 2023 07:27 )  Alb: 2.6 g/dL / Pro: 6.1 g/dL / ALK PHOS: 117 U/L / ALT: <5 U/L / AST: 9 U/L / GGT: x           Urinalysis Basic - ( 23 Mar 2023 15:31 )    Color: Light Yellow / Appearance: Clear / S.015 / pH: x  Gluc: x / Ketone: Negative  / Bili: Negative / Urobili: <2 mg/dL   Blood: x / Protein: Trace / Nitrite: Negative   Leuk Esterase: Negative / RBC: x / WBC x   Sq Epi: x / Non Sq Epi: x / Bacteria: x        Culture - Blood (collected 23 @ 09:07)  Source: .Blood None  Preliminary Report (23 @ 18:02):    No growth to date.        Lactate, Blood: 1.8 mmol/L (23 @ 20:14)  Lactate, Blood: 1.6 mmol/L (23 @ 17:49)  Lactate, Blood: 2.2 mmol/L (23 @ 12:00)  Lactate, Blood: 1.5 mmol/L (23 @ 09:05)  Lactate, Blood: 2.4 mmol/L (23 @ 19:31)      INFECTIOUS DISEASES TESTING  Streptococcus pneumoniae Ag, Ur Result: Negative (23 @ 15:32)  Rapid RVP Result: NotDetec (23 @ 12:55)  COVID-19 PCR: NotDetec (23 @ 10:50)  Procalcitonin, Serum: 0.25 (23 @ 09:05)      INFLAMMATORY MARKERS      RADIOLOGY & ADDITIONAL TESTS:  I have personally reviewed the last available Chest xray  CXR      CT  CT Abdomen and Pelvis No Cont:   ACC: [id number] EXAM:  CT ABDOMEN AND PELVIS   ORDERED BY: MIRI FLORES     PROCEDURE DATE:  2023          INTERPRETATION:  CLINICAL STATEMENT: Abdominal pain.    TECHNIQUE: Contiguous axial CT images were obtained from the lower chest   to thepubic symphysis without intravenous contrast.  Oral contrast was   not administered.  Reformatted images in the coronal and sagittal planes   were acquired.    Comparison: None.    FINDINGS:    LOWER CHEST: Bilateral lower lobe consolidation, superimposed emphysema,   compatible with pneumonia in the appropriate clinical setting. Occlusion   of peripheral lower lobe bronchi; correlation for aspiration recommended.    HEPATOBILIARY: Cholelithiasis. Liver unremarkable. No biliary dilation.    SPLEEN: Unremarkable.    PANCREAS: Unremarkable.    ADRENAL GLANDS: Unremarkable.    KIDNEYS: No hydronephrosis. Bilateral renal cysts, not completely   evaluated. Nonobstructing left renal calculi or vascular calcifications.    ABDOMINOPELVIC NODES: Unremarkable.    PELVIC ORGANS: Unremarkable.    PERITONEUM/MESENTERY/BOWEL: Colonic diverticulosis. No ascites. No bowel   obstruction. No pneumoperitoneum.    BONES/SOFT TISSUES: Osteopenia. Degenerative change of spine.    OTHER: Vascular calcifications.      IMPRESSION:    Bilateral lower lobe consolidation, superimposed emphysema, compatible   with pneumonia in the appropriate clinical setting.    Occlusion of peripheral lower lobe bronchi; correlation for aspiration   recommended.    --- End of Report ---            DELMA RAMIREZ MD; Attending Radiologist  This document has been electronically signed. Mar 21 2023 10:07PM (23 @ 22:01)      CARDIOLOGY TESTING  12 Lead ECG:   Ventricular Rate 98 BPM    Atrial Rate 98 BPM    P-R Interval 154 ms    QRS Duration 80 ms    Q-T Interval 328 ms    QTC Calculation(Bazett) 418 ms    P Axis 75 degrees    R Axis 73 degrees    T Axis 68 degrees    Diagnosis Line Normal sinus rhythm  Normal ECG    Confirmed by CHRISSY FRIEDMAN MD ([id number]) on 3/22/2023 6:45:43 AM (03-21-23 @ 21:52)      MEDICATIONS  atorvastatin 20 Oral at bedtime  budesonide  80 MICROgram(s)/formoterol 4.5 MICROgram(s) Inhaler 2 Inhalation two times a day  cefpodoxime 200 Oral daily  dextrose 5%. 1000 IV Continuous <Continuous>  dextrose 5%. 1000 IV Continuous <Continuous>  dextrose 50% Injectable 25 IV Push once  dextrose 50% Injectable 12.5 IV Push once  dextrose 50% Injectable 25 IV Push once  glucagon  Injectable 1 IntraMuscular once  guaifenesin/dextromethorphan Oral Liquid 5 Oral every 6 hours  heparin   Injectable 5000 SubCutaneous every 12 hours  influenza  Vaccine (HIGH DOSE) 0.7 IntraMuscular once  insulin glargine Injectable (LANTUS) 6 SubCutaneous at bedtime  insulin lispro (ADMELOG) corrective regimen sliding scale  SubCutaneous three times a day before meals  lactobacillus acidophilus 1 Oral daily  pantoprazole    Tablet 40 Oral two times a day  polyethylene glycol 3350 17 Oral daily  sucralfate 1 Oral every 6 hours  tiotropium 2.5 MICROgram(s) Inhaler 2 Inhalation daily      WEIGHT  Weight (kg): 43.1 (23 @ 13:03)  Creatinine, Serum: 1.8 mg/dL (23 @ 07:27)      ANTIBIOTICS:  cefpodoxime 200 milliGRAM(s) Oral daily      All available historical records have been reviewed

## 2023-03-24 NOTE — DIETITIAN INITIAL EVALUATION ADULT - ORAL INTAKE PTA/DIET HISTORY
***** Unable to obtain subjective information, pt busy with other services at time of visit. Will obtain as able at follow up.

## 2023-03-24 NOTE — CHART NOTE - NSCHARTNOTEFT_GEN_A_CORE
Updated by CM that pt daughters now requesting endoscopy. Pt pain has improved since admission and there are no signs of active GI hemorrhage. Given significant medical history and age (initially why pt family denied EGD), pt likely to need further work up prior to endoscopy. Given that pt is stable for discharge (awaiting safe dispo) and likelihood of endoscopy occuring earliest mid week of 3/26, family recommended for outpatient follow up for endoscopy. Spoke with Daughter Chanelle and agrees

## 2023-03-24 NOTE — PHYSICAL THERAPY INITIAL EVALUATION ADULT - PERTINENT HX OF CURRENT PROBLEM, REHAB EVAL
89 year old female with PMHx of HTN, DM, CKD presents to ed with epigastric abdominal pain. Pt is [place] and limited historian, further hx was obtained from daughter Chanelle. Per daughter, pt recently moved to [place] from [place] 2 months ago and was admitted to hospital in [place] last month with pneumonia and discharged to rehab where she was complaining of burning stomach pain and was discharged on protonix (endoscopy was denied because of her age) Pt had a nephrologist in [place] per daughter but does not remember the patient's baseline kidney function. Denies chest pain, nausea, vomiting, SOB.

## 2023-03-24 NOTE — DIETITIAN INITIAL EVALUATION ADULT - REASON INDICATOR FOR ASSESSMENT
*INCOMPLETE; pending assessment*  PI stage 2 or > PI stage 2 or >; however pressure injury now healed   pt with BMI <19

## 2023-03-24 NOTE — PROGRESS NOTE ADULT - SUBJECTIVE AND OBJECTIVE BOX
RAUL LYNN 89y Female  MRN#: [id number]     Hospital Day: 3d    Pt is currently admitted with the primary diagnosis of DIARRHEA ; [facility] PNE[facility]    Overnight events   -No major overnight events                                          ----------------------------------------------------------  OBJECTIVE  PAST MEDICAL & SURGICAL HISTORY  Hypertension    Diabetes mellitus    Chronic kidney disease, unspecified CKD stage                                              -----------------------------------------------------------  ALLERGIES:  penicillin (Unknown)                                            ------------------------------------------------------------    HOME MEDICATIONS  Home Medications:  amLODIPine 2.5 mg oral tablet: 1 tab(s) orally once a day (22 Mar 2023 01:48)  Lipitor 20 mg oral tablet: 1 tab(s) orally once a day (22 Mar 2023 01:49)  lisinopril-hydrochlorothiazide 10 mg-12.5 mg oral tablet: 1 tab(s) orally once a day (22 Mar 2023 01:48)  pioglitazone 15 mg oral tablet: 1 tab(s) orally once a day (22 Mar 2023 01:49)  Protonix 40 mg oral delayed release tablet: 1 tab(s) orally once a day (22 Mar 2023 01:49)                           MEDICATIONS:  STANDING MEDICATIONS  atorvastatin 20 milliGRAM(s) Oral at bedtime  budesonide  80 MICROgram(s)/formoterol 4.5 MICROgram(s) Inhaler 2 Puff(s) Inhalation two times a day  cefpodoxime 200 milliGRAM(s) Oral daily  dextrose 5%. 1000 milliLiter(s) IV Continuous <Continuous>  dextrose 5%. 1000 milliLiter(s) IV Continuous <Continuous>  dextrose 50% Injectable 25 Gram(s) IV Push once  dextrose 50% Injectable 12.5 Gram(s) IV Push once  dextrose 50% Injectable 25 Gram(s) IV Push once  glucagon  Injectable 1 milliGRAM(s) IntraMuscular once  guaifenesin/dextromethorphan Oral Liquid 5 milliLiter(s) Oral every 6 hours  heparin   Injectable 5000 Unit(s) SubCutaneous every 12 hours  influenza  Vaccine (HIGH DOSE) 0.7 milliLiter(s) IntraMuscular once  insulin glargine Injectable (LANTUS) 6 Unit(s) SubCutaneous at bedtime  insulin lispro (ADMELOG) corrective regimen sliding scale   SubCutaneous three times a day before meals  lactobacillus acidophilus 1 Tablet(s) Oral daily  pantoprazole    Tablet 40 milliGRAM(s) Oral two times a day  polyethylene glycol 3350 17 Gram(s) Oral daily  sucralfate 1 Gram(s) Oral every 6 hours  tiotropium 2.5 MICROgram(s) Inhaler 2 Puff(s) Inhalation daily    PRN MEDICATIONS  acetaminophen     Tablet .. 650 milliGRAM(s) Oral every 6 hours PRN  albuterol    90 MICROgram(s) HFA Inhaler 1 Puff(s) Inhalation every 6 hours PRN  aluminum hydroxide/magnesium hydroxide/simethicone Suspension 30 milliLiter(s) Oral every 6 hours PRN  dextrose Oral Gel 15 Gram(s) Oral once PRN  loperamide 2 milliGRAM(s) Oral four times a day PRN  simethicone 80 milliGRAM(s) Chew three times a day PRN                                            ------------------------------------------------------------  VITAL SIGNS: Last 24 Hours  T(C): 35.9 (24 Mar 2023 08:00), Max: 36.9 (23 Mar 2023 17:34)  T(F): 96.7 (24 Mar 2023 08:00), Max: 98.4 (23 Mar 2023 17:34)  HR: 87 (24 Mar 2023 08:00) (87 - 92)  BP: 151/70 (24 Mar 2023 08:00) (116/56 - 151/70)  BP(mean): --  RR: 18 (24 Mar 2023 08:00) (18 - 19)  SpO2: 98% (24 Mar 2023 08:00) (98% - 98%)      23 @ 07:01  -  23 @ 07:00  --------------------------------------------------------  IN: 0 mL / OUT: 354 mL / NET: -354 mL    23 @ 07:01  -  23 @ 12:39  --------------------------------------------------------  IN: 180 mL / OUT: 241 mL / NET: -61 mL                                             --------------------------------------------------------------  LABS:                        10.2   10.20 )-----------( 408      ( 24 Mar 2023 07:27 )             31.0     03-    133<L>  |  99  |  42<H>  ----------------------------<  186<H>  4.0   |  23  |  1.8<H>    Ca    9.5      24 Mar 2023 07:27  Mg     2.6         TPro  6.1  /  Alb  2.6<L>  /  TBili  <0.2  /  DBili  x   /  AST  9   /  ALT  <5  /  AlkPhos  117<H>  24      Urinalysis Basic - ( 23 Mar 2023 15:31 )    Color: Light Yellow / Appearance: Clear / S.015 / pH: x  Gluc: x / Ketone: Negative  / Bili: Negative / Urobili: <2 mg/dL   Blood: x / Protein: Trace / Nitrite: Negative   Leuk Esterase: Negative / RBC: x / WBC x   Sq Epi: x / Non Sq Epi: x / Bacteria: x              Culture - Blood (collected 22 Mar 2023 09:07)  Source: .Blood None  Preliminary Report (23 Mar 2023 18:02):    No growth to date.                                                      --------------------------------------------------------------    PHYSICAL EXAM:  GENERAL: Awake, alert, oriented to person, place, time, situation. Underweight, laying in bed appearing in no acute distress  HEENT: No FNDs, atraumatic, normocephalic  LUNGS: RLL crackles, minimal improvement. No wheeze  HEART: S1/S2. No heaves or thrills  ABD: Soft, non-tender, non-distended.  EXT/NEURO: Strength, sensation and ROM grossly intact.  SKIN: No edema      PLAN:  89 year old female withPMHx of HTN, DM, CKD presents to ed with epigastric abdominal pain. Pt is [place] and limited historian, further hx was obtained from daughter Chanelle. Per daughter, pt recently moved to [place] from [place] 2 months ago and was admitted to hospital in [place] last month with pneumonia and discharged to rehab where she was complaining of burning stomach pain and was discharged on Protonix (endoscopy was denied because of her age) Pt had a nephrologist in [place] per daughter but does not remember the patient's baseline kidney function. Denies chest pain, nausea, vomiting, SOB.    #Severe Sepsis, resolved  #?HCAP  #?H/o interstitial lung disease  #Lactic Acidosis, resolved  - WBC 13K, Tmin 95.6; on RA  - CXR shows b/l patchy opacities + chronic lung disease pattern  - CT A/P showed Bilateral lower lobe consolidation, occlusion of peripheral lower lobe bronchi suggestive of aspiration  -procal 0.25, strep/RVP (-)  - F/u Legionella Ur Ag, MRSA swab, BCx, sputum Cx  -s/p cefepime, will complete 7 days with vantin (renally dosed)  -start spiriva/symbicort, albuterol PRN, outpt establishment with pul on [place]    #Diarrhea, resolving  #Severe Hypomagnesemia, resolved  -intermittent constipation/diarrhea, recently diarrhea and apparent ABx use recently  -C diff/GI PCR (-)  -start immodium, multimodal pain therapy for abdominal pain  -replete Mg as needed, likely secondary to GI losses    #Dyspepsia  #Peptic Ulcer Disease  - was taking Protonix 40mg daily at home with no response  - c/w protonix BID (improved with this)  - endoscopy was not performed before because of her advanced age  - GI f/u OP    #CHRIS Vs progressive CKD, resolving  #Hypertonic hyponatremia  - Cr 2.7 (unknown baseline)  -RBUS- previous renal cysts similar, 429cc in bladder, no hydro  -No retention per post void bladder scans  -FEUrea suggests intrinsice renal disease  -Trend Cr/GFR for baseline    #Troponinemia, resolving - likely 2/2 kidney dysfunction  - trops 0.04-->0.03  - no chest pain  - EKG showed no ischemic changes    #Normocytic Anemia - 2/2 CKD?  - HgB 10.4 (unknown baseline)  - MCV 88  - F/u iron studies, b12, folate    #HTN  - takes amlodipine 2.5 mg daily, HCTZ 12.5mg daily and lisinopril 10mg daily  - hold anti-HTNsive meds for now as BP is soft and restart as needed    #DM  - takes pioglitazone 15mg daily  - c/w insulin lantus 8U and sliding scale    # DVT prophylaxis   -HSQ    # GI prophylaxis   -PROTNIX BID    # Diet   #BMI 17.9  -soft, DASH/TLC  -RD, nutrition eval for malnourishment    # Activity Score (AM-PAC)    #Progress Note Handoff  Pending (specify): abd pain/diarrhea resolution  Family discussion: Per daughter wants assisted living on [place]  Disposition:   -24hrs   -PT home PT with [facility]

## 2023-03-24 NOTE — PROGRESS NOTE ADULT - ASSESSMENT
89 year old female withPMHx of HTN, DM, CKD presents to ed with epigastric abdominal pain. Pt is [place] and limited historian, further hx was obtained from daughter Chanelle. Per daughter, pt recently moved to [place] from [place] 2 months ago and was admitted to hospital in [place] last month with pneumonia and discharged to rehab where she was complaining of burning stomach pain and was discharged on Protonix (endoscopy was denied because of her age) Pt had a nephrologist in [place] per daughter but does not remember the patient's baseline kidney function. Denies chest pain, nausea, vomiting, SOB.    #Severe Sepsis on admission - resolved   #HCAP  #Lactic Acidosis, resolved  #Emphysema changes  - current smoker - counseling proved, pt refusing any nicotine replacement   - WBC 13K, Tmin 95.6; on RA  - CXR shows b/l patchy opacities + chronic lung disease pattern  - CT A/P showed Bilateral lower lobe consolidation, occlusion of peripheral lower lobe bronchi suggestive of aspiration  -procal 0.25  - F/u RVP panel, Strep ur Ag, Legionella Ur Ag, MRSA swab, BCx, sputum Cx  -ID consult appreciated - changed from levaquin to cefepime 3/24 converted to vantin - to complete a total of 7 day course     #Diarrhea - improving   #Severe Hypomagnesemia  -intermittent constipation/diarrhea, recently diarrhea and apparent ABx use recently  -c/w PNA txt with cefepime  -GI PCR, f/u for resolution  -c. diff - negative  - h pylori - stool sent   -replete Mg as needed, likely secondary to GI losses  - 3/24 started prn imodium     #Dyspepsia  #Peptic Ulcer Disease  - was taking Protonix 40mg daily at home with no response  - c/w protonix BID (improved with this)  - endoscopy was not performed before because of her advanced age  - GI f/u OP    #CHRIS Vs progressive CKD, resolving  #Hypertonic hyponatremia  - Cr 2.7 (unknown baseline) tending down to 2.2   -RBUS- previous renal cysts similar, 429cc in bladder, no hydro  -f/u post void bladder scan  - F/u urine lytes    #Troponinemia, resolving - likely 2/2 kidney dysfunction  - trops 0.04-->0.03  - no chest pain  - EKG showed no ischemic changes    #Normocytic Anemia - 2/2 CKD?  - HgB 10.4 (unknown baseline)  - MCV 88  - F/u iron studies, b12, folate    #HTN  - takes amlodipine 2.5 mg daily, HCTZ 12.5mg daily and lisinopril 10mg daily  - hold anti-HTNsive meds for now as BP is soft and restart as needed    #DM  - takes pioglitazone 15mg daily  - c/w insulin lantus 8U and sliding scale    # DVT prophylaxis   -HSQ    # GI prophylaxis   -PROTNIX BID    # Diet   -soft, DASH/TLC    # Activity Score (AM-PAC)    #Progress Note Handoff  Pending (specify): diarrhea improvement, h. pylori, sputum   Family discussion: daughter updated bedside 3/24  Disposition: assisted living   
89 year old female withPMHx of HTN, DM, CKD presents to ed with epigastric abdominal pain. Pt is [place] and limited historian, further hx was obtained from daughter Chanelle. Per daughter, pt recently moved to [place] from [place] 2 months ago and was admitted to hospital in [place] last month with pneumonia and discharged to rehab where she was complaining of burning stomach pain and was discharged on Protonix (endoscopy was denied because of her age) Pt had a nephrologist in [place] per daughter but does not remember the patient's baseline kidney function. Denies chest pain, nausea, vomiting, SOB.    #Severe Sepsis on admission - resolved   #HCAP  #Lactic Acidosis, resolved  #Emphysema changes  - current smoker - counseling proved, pt refusing any nicotine replacement   - WBC 13K, Tmin 95.6; on RA  - CXR shows b/l patchy opacities + chronic lung disease pattern  - CT A/P showed Bilateral lower lobe consolidation, occlusion of peripheral lower lobe bronchi suggestive of aspiration  -procal 0.25  - F/u RVP panel, Strep ur Ag, Legionella Ur Ag, MRSA swab, BCx, sputum Cx  -ID consult appreciated - changed from levaquin to cefepime    #Diarrhea  #Severe Hypomagnesemia  -intermittent constipation/diarrhea, recently diarrhea and apparent ABx use recently  -c/w PNA txt with cefepime  -GI PCR, f/u for resolution  -c. diff - negative  - h pylori - stool sent   -replete Mg as needed, likely secondary to GI losses    #Dyspepsia  #Peptic Ulcer Disease  - was taking Protonix 40mg daily at home with no response  - c/w protonix BID (improved with this)  - endoscopy was not performed before because of her advanced age  - GI f/u OP    #CHRIS Vs progressive CKD, resolving  #Hypertonic hyponatremia  - Cr 2.7 (unknown baseline) tending down to 2.2   -RBUS- previous renal cysts similar, 429cc in bladder, no hydro  -f/u post void bladder scan  - F/u urine lytes    #Troponinemia, resolving - likely 2/2 kidney dysfunction  - trops 0.04-->0.03  - no chest pain  - EKG showed no ischemic changes    #Normocytic Anemia - 2/2 CKD?  - HgB 10.4 (unknown baseline)  - MCV 88  - F/u iron studies, b12, folate    #HTN  - takes amlodipine 2.5 mg daily, HCTZ 12.5mg daily and lisinopril 10mg daily  - hold anti-HTNsive meds for now as BP is soft and restart as needed    #DM  - takes pioglitazone 15mg daily  - c/w insulin lantus 8U and sliding scale    # DVT prophylaxis   -HSQ    # GI prophylaxis   -PROTNIX BID    # Diet   -soft, DASH/TLC    # Activity Score (AM-PAC)    #Progress Note Handoff  Pending (specify): diarrhea resolution, gi pcr, h. pylori, sputum   Family discussion: daughter updated bedside 3/23  Disposition: unclear  
ASSESSMENT  89y Female with a PMH of CKD, DM, HTN presented with abdominal pain, positive CXR admitted for aspiration PNA     IMPRESSION  #Diarrhea  #Abd pain  #Aspiration PNA with Severe sepsis on admission T<36 WBC 13 lactic acidosis  Recent hospitalization in [place] for PNA  < from: CT Abdomen and Pelvis No Cont (03.21.23 @ 22:01) >   Bilateral lower lobe consolidation, superimposed emphysema,   compatible with pneumonia in the appropriate clinical setting. Occlusion   of peripheral lower lobe bronchi; correlation for aspiration recommended.  #Hyponatremia  #CHRIS    RECOMMENDATION  - Changed to cefepime 1g q24h iv as having a lot of diarrhea on levaquin x 7 days (if d/c prior PO vantin PO- dose pending final crcl)   - f/u PULM   - Check CDiff, GI PCR, H pylori stool ag (diarrhea present prior to admission )   - Contact iso until results  - Sputum CX.    If any questions, please call or send a message on [facility] Teams  Please continue to update ID with any pertinent new laboratory or radiographic findings  Spectra [id number] 
ASSESSMENT  89y Female with a PMH of CKD, DM, HTN presented with abdominal pain, positive CXR admitted for aspiration PNA     IMPRESSION  #Diarrhea    Cdiff & GI PCR NEGATIVE   #Abd pain  #Aspiration PNA with Severe sepsis on admission T<36 WBC 13 lactic acidosis  Recent hospitalization in [place] for PNA  < from: CT Abdomen and Pelvis No Cont (03.21.23 @ 22:01) >   Bilateral lower lobe consolidation, superimposed emphysema,   compatible with pneumonia in the appropriate clinical setting. Occlusion   of peripheral lower lobe bronchi; correlation for aspiration recommended.  #Hyponatremia  #CHRIS    RECOMMENDATION  - Changed to cefepime 1g q24h iv as having a lot of diarrhea on levaquin x 7 days (if d/c prior PO vantin PO- dose pending final crcl)   - f/u PULM   -  H pylori stool ag   - GI eval for EGD  - Contact iso until results  - Sputum CX.  - Recall ID PRN    I will be away until 4/3/23; Please contact Dr. Wharton [phone] or Dr. Rowe with any patient matters.  Thank you.     If any questions, please call or send a message on [facility] Teams  Please continue to update ID with any pertinent new laboratory or radiographic findings  Spectra [phone]

## 2023-03-24 NOTE — DIETITIAN INITIAL EVALUATION ADULT - OTHER INFO
89 year old female withPMHx of HTN, DM, CKD presents to ed with epigastric abdominal pain. Pt is [place] and limited historian, further hx was obtained from daughter Chanelle. Per daughter, pt recently moved to [place] from [place] 2 months ago and was admitted to hospital in [place] last month with pneumonia and discharged to rehab where she was complaining of burning stomach pain and was discharged on Protonix (endoscopy was denied because of her age) Pt had a nephrologist in [place] per daughter but does not remember the patient's baseline kidney function. Denies chest pain, nausea, vomiting, SOB.

## 2023-03-24 NOTE — DIETITIAN INITIAL EVALUATION ADULT - PERTINENT MEDS FT
MEDICATIONS  (STANDING):  atorvastatin 20 milliGRAM(s) Oral at bedtime  budesonide  80 MICROgram(s)/formoterol 4.5 MICROgram(s) Inhaler 2 Puff(s) Inhalation two times a day  cefpodoxime 200 milliGRAM(s) Oral daily  dextrose 5%. 1000 milliLiter(s) (100 mL/Hr) IV Continuous <Continuous>  dextrose 5%. 1000 milliLiter(s) (50 mL/Hr) IV Continuous <Continuous>  dextrose 50% Injectable 25 Gram(s) IV Push once  dextrose 50% Injectable 12.5 Gram(s) IV Push once  dextrose 50% Injectable 25 Gram(s) IV Push once  glucagon  Injectable 1 milliGRAM(s) IntraMuscular once  guaifenesin/dextromethorphan Oral Liquid 5 milliLiter(s) Oral every 6 hours  heparin   Injectable 5000 Unit(s) SubCutaneous every 12 hours  influenza  Vaccine (HIGH DOSE) 0.7 milliLiter(s) IntraMuscular once  insulin glargine Injectable (LANTUS) 6 Unit(s) SubCutaneous at bedtime  insulin lispro (ADMELOG) corrective regimen sliding scale   SubCutaneous three times a day before meals  lactobacillus acidophilus 1 Tablet(s) Oral daily  pantoprazole    Tablet 40 milliGRAM(s) Oral two times a day  polyethylene glycol 3350 17 Gram(s) Oral daily  sucralfate 1 Gram(s) Oral every 6 hours  tiotropium 2.5 MICROgram(s) Inhaler 2 Puff(s) Inhalation daily    MEDICATIONS  (PRN):  acetaminophen     Tablet .. 650 milliGRAM(s) Oral every 6 hours PRN Temp greater or equal to 38C (100.4F), Mild Pain (1 - 3)  albuterol    90 MICROgram(s) HFA Inhaler 1 Puff(s) Inhalation every 6 hours PRN Shortness of Breath and/or Wheezing  aluminum hydroxide/magnesium hydroxide/simethicone Suspension 30 milliLiter(s) Oral every 6 hours PRN Dyspepsia  dextrose Oral Gel 15 Gram(s) Oral once PRN Blood Glucose LESS THAN 70 milliGRAM(s)/deciliter  loperamide 2 milliGRAM(s) Oral four times a day PRN following bowel movements  simethicone 80 milliGRAM(s) Chew three times a day PRN bloating/gas pain

## 2023-03-24 NOTE — PROGRESS NOTE ADULT - SUBJECTIVE AND OBJECTIVE BOX
Patient is a 89y old  Female who presents with a chief complaint of Pneumonia due to infectious organism     (24 Mar 2023 14:16)      Patient seen and examined at bedside.  Patient reports improvment in diarrhea, no chest pain or shortness of breaht   ALLERGIES:  penicillin (Unknown)    MEDICATIONS:  acetaminophen     Tablet .. 650 milliGRAM(s) Oral every 6 hours PRN  albuterol    90 MICROgram(s) HFA Inhaler 1 Puff(s) Inhalation every 6 hours PRN  aluminum hydroxide/magnesium hydroxide/simethicone Suspension 30 milliLiter(s) Oral every 6 hours PRN  atorvastatin 20 milliGRAM(s) Oral at bedtime  budesonide  80 MICROgram(s)/formoterol 4.5 MICROgram(s) Inhaler 2 Puff(s) Inhalation two times a day  cefpodoxime 200 milliGRAM(s) Oral daily  dextrose 5%. 1000 milliLiter(s) IV Continuous <Continuous>  dextrose 5%. 1000 milliLiter(s) IV Continuous <Continuous>  dextrose 50% Injectable 25 Gram(s) IV Push once  dextrose 50% Injectable 12.5 Gram(s) IV Push once  dextrose 50% Injectable 25 Gram(s) IV Push once  dextrose Oral Gel 15 Gram(s) Oral once PRN  glucagon  Injectable 1 milliGRAM(s) IntraMuscular once  guaifenesin/dextromethorphan Oral Liquid 5 milliLiter(s) Oral every 6 hours  heparin   Injectable 5000 Unit(s) SubCutaneous every 12 hours  influenza  Vaccine (HIGH DOSE) 0.7 milliLiter(s) IntraMuscular once  insulin glargine Injectable (LANTUS) 6 Unit(s) SubCutaneous at bedtime  insulin lispro (ADMELOG) corrective regimen sliding scale   SubCutaneous three times a day before meals  lactobacillus acidophilus 1 Tablet(s) Oral daily  loperamide 2 milliGRAM(s) Oral four times a day PRN  pantoprazole    Tablet 40 milliGRAM(s) Oral two times a day  polyethylene glycol 3350 17 Gram(s) Oral daily  simethicone 80 milliGRAM(s) Chew three times a day PRN  sucralfate 1 Gram(s) Oral every 6 hours  tiotropium 2.5 MICROgram(s) Inhaler 2 Puff(s) Inhalation daily    Vital Signs Last 24 Hrs  T(F): 99 (24 Mar 2023 15:20), Max: 99 (24 Mar 2023 15:20)  HR: 103 (24 Mar 2023 15:20) (87 - 103)  BP: 102/58 (24 Mar 2023 15:20) (102/58 - 151/70)  RR: 18 (24 Mar 2023 15:20) (18 - 18)  SpO2: 93% (24 Mar 2023 15:20) (93% - 98%)  I&O's Summary    23 Mar 2023 07:01  -  24 Mar 2023 07:00  --------------------------------------------------------  IN: 0 mL / OUT: 354 mL / NET: -354 mL    24 Mar 2023 07:01  -  24 Mar 2023 19:19  --------------------------------------------------------  IN: 420 mL / OUT: 501 mL / NET: -81 mL        PHYSICAL EXAM:  General: NAD, A/O x 3  ENT: MMM  Neck: Supple, No JVD  Lungs: diminished breath sounds no wheezing   Cardio: RRR, S1/S2, No murmurs  Abdomen: Soft, Nontender, Nondistended; Bowel sounds present  Extremities: No cyanosis, No edema    LABS:                        10.2   10.20 )-----------( 408      ( 24 Mar 2023 07:27 )             31.0         133  |  99  |  42  ----------------------------<  186  4.0   |  23  |  1.8    Ca    9.5      24 Mar 2023 07:27  Mg     2.6     24    TPro  6.1  /  Alb  2.6  /  TBili  <0.2  /  DBili  x   /  AST  9   /  ALT  <5  /  AlkPhos  117          Lactate, Blood: 1.8 mmol/L ( @ 20:14)  Lactate, Blood: 1.6 mmol/L ( @ 17:49)  Lactate, Blood: 2.2 mmol/L ( @ 12:00)  Lactate, Blood: 1.5 mmol/L ( @ 09:05)  Lactate, Blood: 2.4 mmol/L ( @ 19:31)          TSH 1.23   TSH with FT4 reflex --  Total T3 --              POCT Blood Glucose.: 213 mg/dL (24 Mar 2023 16:49)  POCT Blood Glucose.: 101 mg/dL (24 Mar 2023 11:09)  POCT Blood Glucose.: 203 mg/dL (24 Mar 2023 08:17)  POCT Blood Glucose.: 176 mg/dL (24 Mar 2023 08:06)  POCT Blood Glucose.: 227 mg/dL (23 Mar 2023 20:52)      Urinalysis Basic - ( 23 Mar 2023 15:31 )    Color: Light Yellow / Appearance: Clear / S.015 / pH: x  Gluc: x / Ketone: Negative  / Bili: Negative / Urobili: <2 mg/dL   Blood: x / Protein: Trace / Nitrite: Negative   Leuk Esterase: Negative / RBC: x / WBC x   Sq Epi: x / Non Sq Epi: x / Bacteria: x        Culture - Blood (collected 22 Mar 2023 09:07)  Source: .Blood None  Preliminary Report (23 Mar 2023 18:02):    No growth to date.      COVID-19 PCR: NotDetec (23 @ 10:50)      RADIOLOGY & ADDITIONAL TESTS:    Care Discussed with Consultants/Other Providers:

## 2023-03-24 NOTE — DIETITIAN INITIAL EVALUATION ADULT - ENERGY INTAKE
PTA: ***  during admission: *** pt documented with 25-50% po intake during admission. PTA: unknown  during admission: poor (<50% estimated needs)

## 2023-03-24 NOTE — DIETITIAN INITIAL EVALUATION ADULT - OTHER CALCULATIONS
calculations done using actual wt  43.1kg (3/22) with considerations for age, wt, BMI, clinical course    ENERGY:2418-3533 kcal/day (30-35 kcal/kg)  PROTEIN: 43-52 gm/day (1-1.2 gm/kg)  FLUID: 4614-1730 ml/day (25-30 ml/kg)

## 2023-03-24 NOTE — PROGRESS NOTE ADULT - PROVIDER SPECIALTY LIST ADULT
Hospitalist
Hospitalist
Infectious Disease
Internal Medicine
Internal Medicine
Hospitalist
Infectious Disease

## 2023-03-24 NOTE — DIETITIAN INITIAL EVALUATION ADULT - NS FNS DIET ORDER
Diet, Soft and Bite Sized:   DASH/TLC {Sodium & Cholesterol Restricted} (03-23-23 @ 07:06) [Active]

## 2023-03-24 NOTE — DIETITIAN INITIAL EVALUATION ADULT - PHYSCIAL ASSESSMENT
cognition: A&Ox4; False Pass    Ht: 155 cm  Wt: 43.1 kg  BMI: 17.9   IBW: 48 kg  UBW: unknown   %wt change: unable to determine    NFPE: unable to perform NFPE; will obtain as able at follow up

## 2023-03-24 NOTE — DIETITIAN INITIAL EVALUATION ADULT - PERTINENT LABORATORY DATA
03-24    133<L>  |  99  |  42<H>  ----------------------------<  186<H>  4.0   |  23  |  1.8<H>    Ca    9.5      24 Mar 2023 07:27  Mg     2.6     03-24    TPro  6.1  /  Alb  2.6<L>  /  TBili  <0.2  /  DBili  x   /  AST  9   /  ALT  <5  /  AlkPhos  117<H>  03-24  POCT Blood Glucose.: 101 mg/dL (03-24-23 @ 11:09)  A1C with Estimated Average Glucose Result: 7.4 % (03-22-23 @ 09:05)

## 2023-03-24 NOTE — DIETITIAN INITIAL EVALUATION ADULT - ADD RECOMMEND
-continue bowel regimen per team  - continue insulin regimen, adjust prn per team to promote euglycemia  Patient is at HIGH Risk, follow up x 3-5 days  Gi Romero, RD  #[id number] or via TEAMS

## 2023-03-24 NOTE — DIETITIAN INITIAL EVALUATION ADULT - NSFNSGIIOFT_GEN_A_CORE
03-23-23 @ 07:01  -  03-24-23 @ 07:00  --------------------------------------------------------  OUT:    Stool (mL): 4 mL  Total OUT: 4 mL    Total NET: -4 mL      03-24-23 @ 07:01  -  03-24-23 @ 14:16  --------------------------------------------------------  OUT:    Stool (mL): 1 mL  Total OUT: 1 mL    Total NET: -1 mL

## 2023-03-24 NOTE — PHYSICAL THERAPY INITIAL EVALUATION ADULT - GENERAL OBSERVATIONS, REHAB EVAL
Patient encountered lying in bed. Requires max encouragement o participate in therapy. Daughter in room.

## 2023-03-24 NOTE — PHYSICAL THERAPY INITIAL EVALUATION ADULT - ADDITIONAL COMMENTS
Patient lives in a house with no steps to enter and 12 steps to bedroom. Was independent in ADLs and ambulation no AD.

## 2023-03-25 VITALS
RESPIRATION RATE: 18 BRPM | OXYGEN SATURATION: 97 % | SYSTOLIC BLOOD PRESSURE: 130 MMHG | HEART RATE: 103 BPM | TEMPERATURE: 98 F

## 2023-03-25 LAB
ALBUMIN SERPL ELPH-MCNC: 2.5 G/DL — LOW (ref 3.5–5.2)
ALP SERPL-CCNC: 99 U/L — SIGNIFICANT CHANGE UP (ref 30–115)
ALT FLD-CCNC: <5 U/L — SIGNIFICANT CHANGE UP (ref 0–41)
ANION GAP SERPL CALC-SCNC: 10 MMOL/L — SIGNIFICANT CHANGE UP (ref 7–14)
AST SERPL-CCNC: 9 U/L — SIGNIFICANT CHANGE UP (ref 0–41)
BILIRUB SERPL-MCNC: <0.2 MG/DL — SIGNIFICANT CHANGE UP (ref 0.2–1.2)
BUN SERPL-MCNC: 36 MG/DL — HIGH (ref 10–20)
CALCIUM SERPL-MCNC: 9.4 MG/DL — SIGNIFICANT CHANGE UP (ref 8.4–10.4)
CHLORIDE SERPL-SCNC: 98 MMOL/L — SIGNIFICANT CHANGE UP (ref 98–110)
CO2 SERPL-SCNC: 25 MMOL/L — SIGNIFICANT CHANGE UP (ref 17–32)
CREAT SERPL-MCNC: 1.9 MG/DL — HIGH (ref 0.7–1.5)
EGFR: 25 ML/MIN/1.73M2 — LOW
GLUCOSE BLDC GLUCOMTR-MCNC: 203 MG/DL — HIGH (ref 70–99)
GLUCOSE BLDC GLUCOMTR-MCNC: 247 MG/DL — HIGH (ref 70–99)
GLUCOSE BLDC GLUCOMTR-MCNC: 74 MG/DL — SIGNIFICANT CHANGE UP (ref 70–99)
GLUCOSE SERPL-MCNC: 80 MG/DL — SIGNIFICANT CHANGE UP (ref 70–99)
H PYLORI AG STL QL: NEGATIVE — SIGNIFICANT CHANGE UP
HCT VFR BLD CALC: 31.3 % — LOW (ref 37–47)
HGB BLD-MCNC: 10 G/DL — LOW (ref 12–16)
MAGNESIUM SERPL-MCNC: 1.9 MG/DL — SIGNIFICANT CHANGE UP (ref 1.8–2.4)
MCHC RBC-ENTMCNC: 28.2 PG — SIGNIFICANT CHANGE UP (ref 27–31)
MCHC RBC-ENTMCNC: 31.9 G/DL — LOW (ref 32–37)
MCV RBC AUTO: 88.4 FL — SIGNIFICANT CHANGE UP (ref 81–99)
NRBC # BLD: 0 /100 WBCS — SIGNIFICANT CHANGE UP (ref 0–0)
PLATELET # BLD AUTO: 412 K/UL — HIGH (ref 130–400)
POTASSIUM SERPL-MCNC: 4.1 MMOL/L — SIGNIFICANT CHANGE UP (ref 3.5–5)
POTASSIUM SERPL-SCNC: 4.1 MMOL/L — SIGNIFICANT CHANGE UP (ref 3.5–5)
PROT SERPL-MCNC: 5.9 G/DL — LOW (ref 6–8)
RBC # BLD: 3.54 M/UL — LOW (ref 4.2–5.4)
RBC # FLD: 15.9 % — HIGH (ref 11.5–14.5)
SODIUM SERPL-SCNC: 133 MMOL/L — LOW (ref 135–146)
WBC # BLD: 8.53 K/UL — SIGNIFICANT CHANGE UP (ref 4.8–10.8)
WBC # FLD AUTO: 8.53 K/UL — SIGNIFICANT CHANGE UP (ref 4.8–10.8)

## 2023-03-25 PROCEDURE — 99239 HOSP IP/OBS DSCHRG MGMT >30: CPT

## 2023-03-25 PROCEDURE — 93010 ELECTROCARDIOGRAM REPORT: CPT

## 2023-03-25 RX ORDER — SUCRALFATE 1 G
1 TABLET ORAL
Qty: 14 | Refills: 0
Start: 2023-03-25

## 2023-03-25 RX ORDER — AMLODIPINE BESYLATE 2.5 MG/1
1 TABLET ORAL
Qty: 0 | Refills: 0 | DISCHARGE

## 2023-03-25 RX ORDER — LISINOPRIL/HYDROCHLOROTHIAZIDE 10-12.5 MG
1 TABLET ORAL
Qty: 0 | Refills: 0 | DISCHARGE

## 2023-03-25 RX ORDER — BUDESONIDE AND FORMOTEROL FUMARATE DIHYDRATE 160; 4.5 UG/1; UG/1
1 AEROSOL RESPIRATORY (INHALATION)
Qty: 3 | Refills: 0
Start: 2023-03-25

## 2023-03-25 RX ORDER — GUAIFENESIN/DEXTROMETHORPHAN 600MG-30MG
5 TABLET, EXTENDED RELEASE 12 HR ORAL
Qty: 100 | Refills: 0
Start: 2023-03-25

## 2023-03-25 RX ORDER — TIOTROPIUM BROMIDE 18 UG/1
2 CAPSULE ORAL; RESPIRATORY (INHALATION)
Qty: 10 | Refills: 0
Start: 2023-03-25

## 2023-03-25 RX ORDER — CEFPODOXIME PROXETIL 100 MG
1 TABLET ORAL
Qty: 5 | Refills: 0
Start: 2023-03-25

## 2023-03-25 RX ORDER — CEFPODOXIME PROXETIL 100 MG
1 TABLET ORAL
Qty: 0 | Refills: 0
Start: 2023-03-25

## 2023-03-25 RX ORDER — PANTOPRAZOLE SODIUM 20 MG/1
1 TABLET, DELAYED RELEASE ORAL
Qty: 0 | Refills: 0 | DISCHARGE

## 2023-03-25 RX ORDER — BUDESONIDE AND FORMOTEROL FUMARATE DIHYDRATE 160; 4.5 UG/1; UG/1
1 AEROSOL RESPIRATORY (INHALATION)
Qty: 0 | Refills: 0 | DISCHARGE
Start: 2023-03-25

## 2023-03-25 RX ORDER — TIOTROPIUM BROMIDE 18 UG/1
2 CAPSULE ORAL; RESPIRATORY (INHALATION)
Qty: 3 | Refills: 0
Start: 2023-03-25

## 2023-03-25 RX ORDER — TIOTROPIUM BROMIDE 18 UG/1
2 CAPSULE ORAL; RESPIRATORY (INHALATION)
Qty: 30 | Refills: 0
Start: 2023-03-25

## 2023-03-25 RX ORDER — LOPERAMIDE HCL 2 MG
1 TABLET ORAL
Qty: 20 | Refills: 0
Start: 2023-03-25

## 2023-03-25 RX ORDER — LOPERAMIDE HCL 2 MG
1 TABLET ORAL
Qty: 0 | Refills: 0
Start: 2023-03-25

## 2023-03-25 RX ORDER — PANTOPRAZOLE SODIUM 20 MG/1
1 TABLET, DELAYED RELEASE ORAL
Qty: 60 | Refills: 1
Start: 2023-03-25

## 2023-03-25 RX ADMIN — Medication 5 MILLILITER(S): at 17:10

## 2023-03-25 RX ADMIN — BUDESONIDE AND FORMOTEROL FUMARATE DIHYDRATE 2 PUFF(S): 160; 4.5 AEROSOL RESPIRATORY (INHALATION) at 12:30

## 2023-03-25 RX ADMIN — PANTOPRAZOLE SODIUM 40 MILLIGRAM(S): 20 TABLET, DELAYED RELEASE ORAL at 13:48

## 2023-03-25 RX ADMIN — Medication 1 TABLET(S): at 12:18

## 2023-03-25 RX ADMIN — Medication 1 GRAM(S): at 13:48

## 2023-03-25 RX ADMIN — Medication 200 MILLIGRAM(S): at 12:29

## 2023-03-25 RX ADMIN — Medication 1 GRAM(S): at 13:51

## 2023-03-25 RX ADMIN — TIOTROPIUM BROMIDE 2 PUFF(S): 18 CAPSULE ORAL; RESPIRATORY (INHALATION) at 11:13

## 2023-03-25 RX ADMIN — HEPARIN SODIUM 5000 UNIT(S): 5000 INJECTION INTRAVENOUS; SUBCUTANEOUS at 05:12

## 2023-03-25 RX ADMIN — Medication 5 MILLILITER(S): at 05:12

## 2023-03-25 RX ADMIN — POLYETHYLENE GLYCOL 3350 17 GRAM(S): 17 POWDER, FOR SOLUTION ORAL at 12:19

## 2023-03-25 RX ADMIN — Medication 4: at 12:12

## 2023-03-25 RX ADMIN — Medication 4: at 17:12

## 2023-03-25 RX ADMIN — Medication 1 GRAM(S): at 17:11

## 2023-03-25 RX ADMIN — PANTOPRAZOLE SODIUM 40 MILLIGRAM(S): 20 TABLET, DELAYED RELEASE ORAL at 17:11

## 2023-03-25 RX ADMIN — HEPARIN SODIUM 5000 UNIT(S): 5000 INJECTION INTRAVENOUS; SUBCUTANEOUS at 17:12

## 2023-03-25 RX ADMIN — Medication 5 MILLILITER(S): at 13:46

## 2023-03-25 RX ADMIN — Medication 1 GRAM(S): at 12:19

## 2023-03-25 NOTE — DISCHARGE NOTE PROVIDER - HOSPITAL COURSE
HPI:    89 year old female withPMHx of HTN, DM, CKD presents to ed with epigastric abdominal pain. Pt is [place] and limited historian, further hx was obtained from daughter Chanelle. Per daughter, pt recently moved to [place] from [place] 2 months ago and was admitted to hospital in [place] last month with pneumonia and discharged to rehab where she was complaining of burning stomach pain and was discharged on protonix (endoscopy was denied because of her age) Pt had a nephrologist in [place] per daughter but does not remember the patient's baseline kidney function. Denies chest pain, nausea, vomiting, SOB.    In ED, pt's vitals are BP 91/52, Tmin 95.6, on RA  Labs show WBC 13K, HgB 10.4, Na+ 125, Cr 2.7, Glu 418  Lactate 2.4 Trops 0.04  EKG showed no ischemic changes  CXR shows b/l patchy opacities (f/u official read)  CT A/P showed Bilateral lower lobe consolidation, occlusion of peripheral lower lobe bronchi suggestive of aspiration  Admitted to medicine for pneumonia management    Hospital Course:    Pt monitored of 5 days. Admitted for Pneumonia, likely resolving chronic pneumonia on chronic lung disease (given hx smoking). Pt afebrile without elevation in WBC or fevers on AV antibiotics, transitioned to oral without change in clinical status. Pt sat well on RA, ambulating without [id number] or severe desaturation. Pt evaluated by pulmonology to establish care with recs for starting inhalers. Pt to f/u with Dr. Landa outpatient. Pt evaluated as well for abdominal pain, per family they denied EGD in the past given she is high risk of morbidity but were seeking endoscopy near end of pt hospital stay. Family told to follow up outpatient as pt abdominal pain has significant improved and pt has no clinical reason for admission at this time. Pt found to have distal DVT, per vasc no indication for AC but should repeat imaging and f/u Dr. Rosales after imaging performed. Pt is stable for discharge with close pulm, PCP, GI, and vascular follow up HPI:    89 year old female withPMHx of HTN, DM, CKD presents to ed with epigastric abdominal pain. Pt is [place] and limited historian, further hx was obtained from daughter Chanelle. Per daughter, pt recently moved to [place] from [place] 2 months ago and was admitted to hospital in [place] last month with pneumonia and discharged to rehab where she was complaining of burning stomach pain and was discharged on protonix (endoscopy was denied because of her age) Pt had a nephrologist in [place] per daughter but does not remember the patient's baseline kidney function. Denies chest pain, nausea, vomiting, SOB.    In ED, pt's vitals are BP 91/52, Tmin 95.6, on RA  EKG showed no ischemic changes  CXR shows b/l patchy opacities (f/u official read)  CT A/P showed Bilateral lower lobe consolidation, occlusion of peripheral lower lobe bronchi suggestive of aspiration  Admitted to medicine for pneumonia management    Hospital Course:    Pt monitored of 5 days. Admitted for Pneumonia, likely resolving chronic pneumonia on chronic lung disease (given hx smoking). Pt afebrile without elevation in WBC or fevers on AV antibiotics, transitioned to oral without change in clinical status. Pt sat well on RA, ambulating without [id number] or severe desaturation. Pt evaluated by pulmonology to establish care with recs for starting inhalers. Pt to f/u with Dr. Landa outpatient. Pt evaluated as well for abdominal pain, per family they denied EGD in the past given she is high risk of morbidity but were seeking endoscopy near end of pt hospital stay. Family told to follow up outpatient as pt abdominal pain has significant improved and pt has no clinical reason for admission at this time. Pt found to have distal DVT, per vasc no indication for AC but should repeat imaging and f/u Dr. Rosales after imaging performed. Pt is stable for discharge with close pulm, PCP, GI, and vascular follow up HPI:    89 year old female withPMHx of HTN, DM, CKD presents to ed with epigastric abdominal pain. Pt is [place] and limited historian, further hx was obtained from daughter Chanelle. Per daughter, pt recently moved to [place] from [place] 2 months ago and was admitted to hospital in [place] last month with pneumonia and discharged to rehab where she was complaining of burning stomach pain and was discharged on protonix (endoscopy was denied because of her age) Pt had a nephrologist in [place] per daughter but does not remember the patient's baseline kidney function. Denies chest pain, nausea, vomiting, SOB.    In ED, pt's vitals are BP 91/52, Tmin 95.6, on RA  EKG showed no ischemic changes  CXR shows b/l patchy opacities (f/u official read)  CT A/P showed Bilateral lower lobe consolidation, occlusion of peripheral lower lobe bronchi suggestive of aspiration  Admitted to medicine for pneumonia management    Hospital Course:    Pt monitored of 5 days. Admitted for Pneumonia, likely resolving chronic pneumonia on chronic lung disease (given hx smoking). Pt afebrile without elevation in WBC or fevers on AV antibiotics, transitioned to oral without change in clinical status. Pt sat well on RA, ambulating without [id number] or severe desaturation. Pt evaluated by pulmonology to establish care with recs for starting inhalers. Pt to f/u with Dr. Landa outpatient. Pt evaluated as well for abdominal pain, per family they denied EGD in the past given she is high risk of morbidity but were seeking endoscopy near end of pt hospital stay. Family told to follow up outpatient as pt abdominal pain has significant improved and pt has no clinical reason for admission at this time. Pt found to have distal DVT, per vasc no indication for AC but should repeat imaging and f/u Dr. Rosales after imaging performed. Pt is stable for discharge with close pulm, PCP, GI, and vascular follow up    Patient discharged home with family

## 2023-03-25 NOTE — CONSULT NOTE ADULT - CONSULT REASON
Thrombus visualized within the left gastrocnemius and peroneal veins seen on incidental duplex
Aspiration PNA
OP establishment

## 2023-03-25 NOTE — DISCHARGE NOTE NURSING/CASE MANAGEMENT/SOCIAL WORK - PATIENT PORTAL LINK FT
You can access the FollowMyHealth Patient Portal offered by [facility] by registering at the following website: [email or web address]. By joining [employer]’s FollowMyHealth portal, you will also be able to view your health information using other applications (apps) compatible with our system.

## 2023-03-25 NOTE — DISCHARGE NOTE PROVIDER - NSDCMRMEDTOKEN_GEN_ALL_CORE_FT
aluminum hydroxide-magnesium hydroxide 200 mg-200 mg/5 mL oral suspension: 30 milliliter(s) orally every 6 hours As needed Dyspepsia  amLODIPine 2.5 mg oral tablet: 1 tab(s) orally once a day  Lipitor 20 mg oral tablet: 1 tab(s) orally once a day  lisinopril-hydrochlorothiazide 10 mg-12.5 mg oral tablet: 1 tab(s) orally once a day  pioglitazone 15 mg oral tablet: 1 tab(s) orally once a day  Protonix 40 mg oral delayed release tablet: 1 tab(s) orally once a day   aluminum hydroxide-magnesium hydroxide 200 mg-200 mg/5 mL oral suspension: 30 milliliter(s) orally every 6 hours As needed Dyspepsia  budesonide-formoterol 80 mcg-4.5 mcg/inh inhalation aerosol: 1 puff(s) inhaled 2 times a day  Lipitor 20 mg oral tablet: 1 tab(s) orally once a day  pioglitazone 15 mg oral tablet: 1 tab(s) orally once a day  Protonix 40 mg oral delayed release tablet: 1 tab(s) orally once a day   aluminum hydroxide-magnesium hydroxide 200 mg-200 mg/5 mL oral suspension: 30 milliliter(s) orally every 6 hours As needed Dyspepsia  budesonide-formoterol 80 mcg-4.5 mcg/inh inhalation aerosol: 1 puff(s) inhaled 2 times a day  cefpodoxime 200 mg oral tablet: 1 tab(s) orally once a day START 3/26/23, LAST DOSE 3/30/23  dextromethorphan-guaifenesin 10 mg-100 mg/5 mL oral liquid: 5 milliliter(s) orally every 6 hours as needed for  cough  Lipitor 20 mg oral tablet: 1 tab(s) orally once a day  loperamide 2 mg oral tablet: 1 tab(s) orally 4 times a day as needed for  diarrhea MDD: 4 tabs  magnesium hydroxide/aluminum hydroxide/simethicone 400 mg-400 mg-40 mg/5 mL oral suspension: 10 milliliter(s) orally 4 times a day as needed for  indigestion  pioglitazone 15 mg oral tablet: 1 tab(s) orally once a day  Protonix 40 mg oral delayed release tablet: 1 tab(s) orally 2 times a day  sucralfate 1 g oral tablet: 1 tab(s) orally every 6 hours as needed for  indigestion

## 2023-03-25 NOTE — CONSULT NOTE ADULT - ASSESSMENT
Patient 89 yo f/ with pmhx dm, htn, mary carmen on ckd, currently here with pneumonia. Consult for LLE DVT of gastroc and peroneal vein. Patient ambulating. No swelling or pain on PE of LLE.     Plan  Care per primary team  No therapeutic anticoagulation needed  Repeat duplex in one week  f/u in office with Dr. Rosales after repeat duplex  Recall as needed

## 2023-03-25 NOTE — DISCHARGE NOTE NURSING/CASE MANAGEMENT/SOCIAL WORK - NSDCPEFALRISK_GEN_ALL_CORE
For information on Fall & Injury Prevention, visit: [email or web address] OR  [email or web address] OR  https://www.cdc.gov/steadi/patient.html

## 2023-03-25 NOTE — DISCHARGE NOTE PROVIDER - NSDCCPCAREPLAN_GEN_ALL_CORE_FT
PRINCIPAL DISCHARGE DIAGNOSIS  Diagnosis: Pneumonia, aspiration  Assessment and Plan of Treatment: -Please continue your antibiotic for 5 additional days     PRINCIPAL DISCHARGE DIAGNOSIS  Diagnosis: Pneumonia, aspiration  Assessment and Plan of Treatment: -Please continue your antibiotic for 5 additional days      SECONDARY DISCHARGE DIAGNOSES  Diagnosis: Chronic lung disease  Assessment and Plan of Treatment: -Please continue your inhalers as prescribed  -Please follow up with pulmonology at [facility]    Diagnosis: Peptic ulcer disease  Assessment and Plan of Treatment: -Please continue medications as prescribed  -Please follow up with gastroenterology at [facility] for further management    Diagnosis: Acute deep vein thrombosis of distal leg, left  Assessment and Plan of Treatment: -Please follow up with your primary care provider for repeat imaging of your leg to evaluate for extension of cloth  -Please follow up with Dr. Rosales after the scan is performed for further monitoring and management

## 2023-03-25 NOTE — DISCHARGE NOTE PROVIDER - CARE PROVIDERS DIRECT ADDRESSES
,DirectAddress_Unknown,[email or web address] ,DirectAddress_Unknown,[email or web address],DirectAddress_Unknown,[email or web address]

## 2023-03-25 NOTE — DISCHARGE NOTE PROVIDER - ATTENDING DISCHARGE PHYSICAL EXAMINATION:
Vital Signs Last 24 Hrs  T(F): 98.3 (25 Mar 2023 08:30), Max: 99 (24 Mar 2023 15:20)  HR: 99 (25 Mar 2023 08:30) (99 - 103)  BP: 104/76 (25 Mar 2023 08:30) (102/58 - 104/76)  RR: 16 (25 Mar 2023 08:30) (16 - 18)  SpO2: 96% (25 Mar 2023 08:30) (93% - 96%)    PHYSICAL EXAM:  GENERAL: NAD, well-groomed, well-developed  HEAD:  Atraumatic, Normocephalic  EYES: EOMI, conjunctiva and sclera clear  ENMT: Moist mucous membranes, Good dentition, no thrush  NECK: Supple, No JVD  CHEST/LUNG: Clear to auscultation bilaterally, poor air entry, non-labored breathing  HEART: RRR; S1/S2, 2/6 murmur  ABDOMEN: Soft, Nontender, Nondistended; Bowel sounds present  VASCULAR: Normal pulses, Normal capillary refill  EXTREMITIES: No calf tenderness, No cyanosis, No edema  LYMPH: Normal; No lymphadenopathy noted  SKIN: Warm, Intact  PSYCH: Normal mood, Normal affect  NERVOUS SYSTEM:  A/O x2, poor concentration

## 2023-03-25 NOTE — DISCHARGE NOTE PROVIDER - CARE PROVIDER_API CALL
Caden Bradford)  Critical Care Medicine; Internal Medicine; Pulmonary Disease  [address] [place] [address]  Phone: [phone]  Fax: [phone]  Follow Up Time: 1 week    Tony Rosales)  Surgery; Vascular Surgery  [address] [place] [address]  Phone: [phone]  Fax: [phone]  Follow Up Time: 1 week   Caden Bradford)  Critical Care Medicine; Internal Medicine; Pulmonary Disease  [address] [place] [address]  Phone: [phone]  Fax: [phone]  Follow Up Time: 1 week    Tony Rosales)  Surgery; Vascular Surgery  [address] [place] [address]  Phone: [phone]  Fax: [phone]  Follow Up Time: 2 weeks    Your Primary Care Provider,   Phone: (   )    -  Fax: (   )    -  Follow Up Time: 1-3 days    Jaclyn Serrato)  Gastroenterology  [address] [place] [address]  Phone: [phone]  Fax: [phone]  Follow Up Time: 1 month

## 2023-03-25 NOTE — DISCHARGE NOTE PROVIDER - PROVIDER TOKENS
PROVIDER:[TOKEN:[[id number]],FOLLOWUP:[1 week]],PROVIDER:[TOKEN:[[id number]],FOLLOWUP:[1 week]] PROVIDER:[TOKEN:[[id number]],FOLLOWUP:[1 week]],PROVIDER:[TOKEN:[[id number]],FOLLOWUP:[2 weeks]],FREE:[LAST:[Your Primary Care Provider],PHONE:[(   )    -],FAX:[(   )    -],FOLLOWUP:[1-3 days]],PROVIDER:[TOKEN:[[id number]],FOLLOWUP:[1 month]]

## 2023-03-25 NOTE — CONSULT NOTE ADULT - SUBJECTIVE AND OBJECTIVE BOX
GENERAL SURGERY CONSULT NOTE    Patient: RAUL LYNN , 89y (31)Female   MRN: [id number]  Location: [address] ([address]) 019 A  Visit: 23 Inpatient  Date: 23 @ 12:42    HPI:  89 year old female withPMHx of HTN, DM, CKD presents to ed with epigastric abdominal pain. Pt is [place] and limited historian, further hx was obtained from daughter Chanelle. Per daughter, pt recently moved to [place] from [place] 2 months ago and was admitted to hospital in [place] last month with pneumonia and discharged to rehab where she was complaining of burning stomach pain and was discharged on protonix (endoscopy was denied because of her age) Pt had a nephrologist in [place] per daughter but does not remember the patient's baseline kidney function. Denies chest pain, nausea, vomiting, SOB    In ED, pt's vitals are BP 91/52, Tmin 95.6, on RA  Labs show WBC 13K, HgB 10.4, Na+ 125, Cr 2.7, Glu 418  Lactate 2.4 Trops 0.04  EKG showed no ischemic changes  CXR shows b/l patchy opacities (f/u official read)  CT A/P showed Bilateral lower lobe consolidation, occlusion of peripheral lower lobe bronchi suggestive of aspiration  Admitted to medicine for pneumonia management (22 Mar 2023 02:08)    Vascular surgery consult  Incidental LLE peroneal and gastroc DVT  Patient 89 yo f/ with pmhx dm, htn, mary carmen on ckd, currently here with pneumonia. Patient ambulating. No swelling or pain on PE of LLE.     PAST MEDICAL & SURGICAL HISTORY:  Hypertension  Diabetes mellitus  Chronic kidney disease, unspecified CKD stage    Home Medications:  amLODIPine 2.5 mg oral tablet: 1 tab(s) orally once a day (22 Mar 2023 01:48)  Lipitor 20 mg oral tablet: 1 tab(s) orally once a day (22 Mar 2023 01:49)  lisinopril-hydrochlorothiazide 10 mg-12.5 mg oral tablet: 1 tab(s) orally once a day (22 Mar 2023 01:48)  pioglitazone 15 mg oral tablet: 1 tab(s) orally once a day (22 Mar 2023 01:49)  Protonix 40 mg oral delayed release tablet: 1 tab(s) orally once a day (22 Mar 2023 01:49)        VITALS:  T(F): 99 (23 @ 15:20), Max: 99 (23 @ 15:20)  HR: 103 (23 @ 15:20) (103 - 103)  BP: 102/58 (23 @ 15:20) (102/58 - 102/58)  RR: 18 (23 @ 15:20) (18 - 18)  SpO2: 93% (23 @ 15:20) (93% - 93%)    PHYSICAL EXAM:  General: NAD, AAOx3, calm and cooperative  Cardiac: RRR S1, S2, no Murmurs, rubs or gallops  Respiratory: CTAB,  Abdomen: Soft, non-distended, non-tender, no rebound, no guarding. +BS.  Neuro: Sensation grossly intact and equal throughout, no focal deficits  Vascular: Pulses 2+ throughout, extremities well perfused  Skin: Warm/dry, normal color, no jaundice  Incision/wound: healing well, dressings in place, clean, dry and intact    MEDICATIONS  (STANDING):  atorvastatin 20 milliGRAM(s) Oral at bedtime  budesonide  80 MICROgram(s)/formoterol 4.5 MICROgram(s) Inhaler 2 Puff(s) Inhalation two times a day  cefpodoxime 200 milliGRAM(s) Oral daily  dextrose 5%. 1000 milliLiter(s) (100 mL/Hr) IV Continuous <Continuous>  dextrose 5%. 1000 milliLiter(s) (50 mL/Hr) IV Continuous <Continuous>  dextrose 50% Injectable 25 Gram(s) IV Push once  dextrose 50% Injectable 12.5 Gram(s) IV Push once  dextrose 50% Injectable 25 Gram(s) IV Push once  glucagon  Injectable 1 milliGRAM(s) IntraMuscular once  guaifenesin/dextromethorphan Oral Liquid 5 milliLiter(s) Oral every 6 hours  heparin   Injectable 5000 Unit(s) SubCutaneous every 12 hours  influenza  Vaccine (HIGH DOSE) 0.7 milliLiter(s) IntraMuscular once  insulin glargine Injectable (LANTUS) 6 Unit(s) SubCutaneous at bedtime  insulin lispro (ADMELOG) corrective regimen sliding scale   SubCutaneous three times a day before meals  lactobacillus acidophilus 1 Tablet(s) Oral daily  pantoprazole    Tablet 40 milliGRAM(s) Oral two times a day  polyethylene glycol 3350 17 Gram(s) Oral daily  sucralfate 1 Gram(s) Oral every 6 hours  tiotropium 2.5 MICROgram(s) Inhaler 2 Puff(s) Inhalation daily    MEDICATIONS  (PRN):  acetaminophen     Tablet .. 650 milliGRAM(s) Oral every 6 hours PRN Temp greater or equal to 38C (100.4F), Mild Pain (1 - 3)  albuterol    90 MICROgram(s) HFA Inhaler 1 Puff(s) Inhalation every 6 hours PRN Shortness of Breath and/or Wheezing  aluminum hydroxide/magnesium hydroxide/simethicone Suspension 30 milliLiter(s) Oral every 6 hours PRN Dyspepsia  dextrose Oral Gel 15 Gram(s) Oral once PRN Blood Glucose LESS THAN 70 milliGRAM(s)/deciliter  loperamide 2 milliGRAM(s) Oral four times a day PRN following bowel movements  simethicone 80 milliGRAM(s) Chew three times a day PRN bloating/gas pain      LAB/STUDIES:                        10.0   8.53  )-----------( 412      ( 25 Mar 2023 06:31 )             31.3     03-25    133<L>  |  98  |  36<H>  ----------------------------<  80  4.1   |  25  |  1.9<H>    Ca    9.4      25 Mar 2023 06:31  Mg     1.9     03-25    TPro  5.9<L>  /  Alb  2.5<L>  /  TBili  <0.2  /  DBili  x   /  AST  9   /  ALT  <5  /  AlkPhos  99  03-25      LIVER FUNCTIONS - ( 25 Mar 2023 06:31 )  Alb: 2.5 g/dL / Pro: 5.9 g/dL / ALK PHOS: 99 U/L / ALT: <5 U/L / AST: 9 U/L / GGT: x           Urinalysis Basic - ( 23 Mar 2023 15:31 )    Color: Light Yellow / Appearance: Clear / S.015 / pH: x  Gluc: x / Ketone: Negative  / Bili: Negative / Urobili: <2 mg/dL   Blood: x / Protein: Trace / Nitrite: Negative   Leuk Esterase: Negative / RBC: x / WBC x   Sq Epi: x / Non Sq Epi: x / Bacteria: x

## 2023-03-27 LAB
CULTURE RESULTS: SIGNIFICANT CHANGE UP
SPECIMEN SOURCE: SIGNIFICANT CHANGE UP

## 2023-03-29 DIAGNOSIS — E87.20 ACIDOSIS, UNSPECIFIED: ICD-10-CM

## 2023-03-29 DIAGNOSIS — D64.9 ANEMIA, UNSPECIFIED: ICD-10-CM

## 2023-03-29 DIAGNOSIS — Z88.0 ALLERGY STATUS TO PENICILLIN: ICD-10-CM

## 2023-03-29 DIAGNOSIS — A41.9 SEPSIS, UNSPECIFIED ORGANISM: ICD-10-CM

## 2023-03-29 DIAGNOSIS — H91.90 UNSPECIFIED HEARING LOSS, UNSPECIFIED EAR: ICD-10-CM

## 2023-03-29 DIAGNOSIS — I12.9 HYPERTENSIVE CHRONIC KIDNEY DISEASE WITH STAGE 1 THROUGH STAGE 4 CHRONIC KIDNEY DISEASE, OR UNSPECIFIED CHRONIC KIDNEY DISEASE: ICD-10-CM

## 2023-03-29 DIAGNOSIS — N17.9 ACUTE KIDNEY FAILURE, UNSPECIFIED: ICD-10-CM

## 2023-03-29 DIAGNOSIS — K25.9 GASTRIC ULCER, UNSPECIFIED AS ACUTE OR CHRONIC, WITHOUT HEMORRHAGE OR PERFORATION: ICD-10-CM

## 2023-03-29 DIAGNOSIS — R19.7 DIARRHEA, UNSPECIFIED: ICD-10-CM

## 2023-03-29 DIAGNOSIS — R63.6 UNDERWEIGHT: ICD-10-CM

## 2023-03-29 DIAGNOSIS — R10.13 EPIGASTRIC PAIN: ICD-10-CM

## 2023-03-29 DIAGNOSIS — E86.0 DEHYDRATION: ICD-10-CM

## 2023-03-29 DIAGNOSIS — Z71.3 DIETARY COUNSELING AND SURVEILLANCE: ICD-10-CM

## 2023-03-29 DIAGNOSIS — N18.9 CHRONIC KIDNEY DISEASE, UNSPECIFIED: ICD-10-CM

## 2023-03-29 DIAGNOSIS — J43.9 EMPHYSEMA, UNSPECIFIED: ICD-10-CM

## 2023-03-29 DIAGNOSIS — E83.42 HYPOMAGNESEMIA: ICD-10-CM

## 2023-03-29 DIAGNOSIS — E87.1 HYPO-OSMOLALITY AND HYPONATREMIA: ICD-10-CM

## 2023-03-29 DIAGNOSIS — I21.A1 MYOCARDIAL INFARCTION TYPE 2: ICD-10-CM

## 2023-03-29 DIAGNOSIS — I82.462 ACUTE EMBOLISM AND THROMBOSIS OF LEFT CALF MUSCULAR VEIN: ICD-10-CM

## 2023-03-29 DIAGNOSIS — E11.22 TYPE 2 DIABETES MELLITUS WITH DIABETIC CHRONIC KIDNEY DISEASE: ICD-10-CM

## 2023-03-29 DIAGNOSIS — E11.65 TYPE 2 DIABETES MELLITUS WITH HYPERGLYCEMIA: ICD-10-CM

## 2023-03-29 DIAGNOSIS — R65.20 SEVERE SEPSIS WITHOUT SEPTIC SHOCK: ICD-10-CM

## 2023-03-29 DIAGNOSIS — J69.0 PNEUMONITIS DUE TO INHALATION OF FOOD AND VOMIT: ICD-10-CM

## 2023-03-29 DIAGNOSIS — I82.452 ACUTE EMBOLISM AND THROMBOSIS OF LEFT PERONEAL VEIN: ICD-10-CM

## 2023-03-29 DIAGNOSIS — R09.02 HYPOXEMIA: ICD-10-CM

## 2023-03-29 DIAGNOSIS — N28.1 CYST OF KIDNEY, ACQUIRED: ICD-10-CM

## 2023-03-29 DIAGNOSIS — Z20.822 CONTACT WITH AND (SUSPECTED) EXPOSURE TO COVID-19: ICD-10-CM

## 2023-03-29 DIAGNOSIS — F17.210 NICOTINE DEPENDENCE, CIGARETTES, UNCOMPLICATED: ICD-10-CM

## 2023-04-03 ENCOUNTER — INPATIENT (INPATIENT)
Facility: HOSPITAL | Age: 88
LOS: 15 days | Discharge: HOSPICE | DRG: 871 | End: 2023-04-19
Attending: HOSPITALIST | Admitting: HOSPITALIST
Payer: MEDICARE

## 2023-04-03 VITALS
TEMPERATURE: 98 F | HEART RATE: 110 BPM | HEIGHT: 60 IN | RESPIRATION RATE: 22 BRPM | SYSTOLIC BLOOD PRESSURE: 149 MMHG | OXYGEN SATURATION: 97 % | WEIGHT: 104.94 LBS | DIASTOLIC BLOOD PRESSURE: 79 MMHG

## 2023-04-03 DIAGNOSIS — J18.9 PNEUMONIA, UNSPECIFIED ORGANISM: ICD-10-CM

## 2023-04-03 PROBLEM — N18.9 CHRONIC KIDNEY DISEASE, UNSPECIFIED: Chronic | Status: ACTIVE | Noted: 2023-03-22

## 2023-04-03 PROBLEM — I10 ESSENTIAL (PRIMARY) HYPERTENSION: Chronic | Status: ACTIVE | Noted: 2023-03-22

## 2023-04-03 PROBLEM — E11.9 TYPE 2 DIABETES MELLITUS WITHOUT COMPLICATIONS: Chronic | Status: ACTIVE | Noted: 2023-03-22

## 2023-04-03 LAB
ALBUMIN SERPL ELPH-MCNC: 2.9 G/DL — LOW (ref 3.5–5.2)
ALP SERPL-CCNC: 125 U/L — HIGH (ref 30–115)
ALT FLD-CCNC: 9 U/L — SIGNIFICANT CHANGE UP (ref 0–41)
ANION GAP SERPL CALC-SCNC: 10 MMOL/L — SIGNIFICANT CHANGE UP (ref 7–14)
ANION GAP SERPL CALC-SCNC: 11 MMOL/L — SIGNIFICANT CHANGE UP (ref 7–14)
APPEARANCE UR: CLEAR — SIGNIFICANT CHANGE UP
AST SERPL-CCNC: 21 U/L — SIGNIFICANT CHANGE UP (ref 0–41)
BACTERIA # UR AUTO: NEGATIVE — SIGNIFICANT CHANGE UP
BASE EXCESS BLDV CALC-SCNC: 6.9 MMOL/L — HIGH (ref -2–3)
BASOPHILS # BLD AUTO: 0.04 K/UL — SIGNIFICANT CHANGE UP (ref 0–0.2)
BASOPHILS NFR BLD AUTO: 0.5 % — SIGNIFICANT CHANGE UP (ref 0–1)
BILIRUB SERPL-MCNC: 0.2 MG/DL — SIGNIFICANT CHANGE UP (ref 0.2–1.2)
BILIRUB UR-MCNC: NEGATIVE — SIGNIFICANT CHANGE UP
BUN SERPL-MCNC: 26 MG/DL — HIGH (ref 10–20)
BUN SERPL-MCNC: 32 MG/DL — HIGH (ref 10–20)
CA-I SERPL-SCNC: 1.45 MMOL/L — HIGH (ref 1.15–1.33)
CALCIUM SERPL-MCNC: 10.7 MG/DL — HIGH (ref 8.4–10.4)
CALCIUM SERPL-MCNC: 9.9 MG/DL — SIGNIFICANT CHANGE UP (ref 8.4–10.4)
CHLORIDE SERPL-SCNC: 93 MMOL/L — LOW (ref 98–110)
CHLORIDE SERPL-SCNC: 96 MMOL/L — LOW (ref 98–110)
CO2 SERPL-SCNC: 27 MMOL/L — SIGNIFICANT CHANGE UP (ref 17–32)
CO2 SERPL-SCNC: 29 MMOL/L — SIGNIFICANT CHANGE UP (ref 17–32)
COLOR SPEC: SIGNIFICANT CHANGE UP
COMMENT - URINE: SIGNIFICANT CHANGE UP
CREAT SERPL-MCNC: 1.4 MG/DL — SIGNIFICANT CHANGE UP (ref 0.7–1.5)
CREAT SERPL-MCNC: 1.6 MG/DL — HIGH (ref 0.7–1.5)
DIFF PNL FLD: NEGATIVE — SIGNIFICANT CHANGE UP
EGFR: 30 ML/MIN/1.73M2 — LOW
EGFR: 36 ML/MIN/1.73M2 — LOW
EOSINOPHIL # BLD AUTO: 0.34 K/UL — SIGNIFICANT CHANGE UP (ref 0–0.7)
EOSINOPHIL NFR BLD AUTO: 3.9 % — SIGNIFICANT CHANGE UP (ref 0–8)
EPI CELLS # UR: 14 /HPF — HIGH (ref 0–5)
FLUAV AG NPH QL: SIGNIFICANT CHANGE UP
FLUBV AG NPH QL: SIGNIFICANT CHANGE UP
GAS PNL BLDV: 132 MMOL/L — LOW (ref 136–145)
GAS PNL BLDV: SIGNIFICANT CHANGE UP
GLUCOSE BLDC GLUCOMTR-MCNC: 177 MG/DL — HIGH (ref 70–99)
GLUCOSE BLDC GLUCOMTR-MCNC: 205 MG/DL — HIGH (ref 70–99)
GLUCOSE BLDC GLUCOMTR-MCNC: 214 MG/DL — HIGH (ref 70–99)
GLUCOSE BLDC GLUCOMTR-MCNC: 408 MG/DL — HIGH (ref 70–99)
GLUCOSE SERPL-MCNC: 157 MG/DL — HIGH (ref 70–99)
GLUCOSE SERPL-MCNC: 184 MG/DL — HIGH (ref 70–99)
GLUCOSE UR QL: NEGATIVE — SIGNIFICANT CHANGE UP
HCO3 BLDV-SCNC: 32 MMOL/L — HIGH (ref 22–29)
HCT VFR BLD CALC: 31.8 % — LOW (ref 37–47)
HCT VFR BLDA CALC: 31 % — LOW (ref 39–51)
HGB BLD CALC-MCNC: 10.2 G/DL — LOW (ref 12.6–17.4)
HGB BLD-MCNC: 10 G/DL — LOW (ref 12–16)
HYALINE CASTS # UR AUTO: 1 /LPF — SIGNIFICANT CHANGE UP (ref 0–7)
IMM GRANULOCYTES NFR BLD AUTO: 0.9 % — HIGH (ref 0.1–0.3)
KETONES UR-MCNC: NEGATIVE — SIGNIFICANT CHANGE UP
LACTATE BLDV-MCNC: 1.4 MMOL/L — SIGNIFICANT CHANGE UP (ref 0.5–2)
LACTATE SERPL-SCNC: 1.6 MMOL/L — SIGNIFICANT CHANGE UP (ref 0.7–2)
LEUKOCYTE ESTERASE UR-ACNC: ABNORMAL
LIDOCAIN IGE QN: 18 U/L — SIGNIFICANT CHANGE UP (ref 7–60)
LYMPHOCYTES # BLD AUTO: 2.38 K/UL — SIGNIFICANT CHANGE UP (ref 1.2–3.4)
LYMPHOCYTES # BLD AUTO: 27.1 % — SIGNIFICANT CHANGE UP (ref 20.5–51.1)
MCHC RBC-ENTMCNC: 28.2 PG — SIGNIFICANT CHANGE UP (ref 27–31)
MCHC RBC-ENTMCNC: 31.4 G/DL — LOW (ref 32–37)
MCV RBC AUTO: 89.8 FL — SIGNIFICANT CHANGE UP (ref 81–99)
MONOCYTES # BLD AUTO: 0.59 K/UL — SIGNIFICANT CHANGE UP (ref 0.1–0.6)
MONOCYTES NFR BLD AUTO: 6.7 % — SIGNIFICANT CHANGE UP (ref 1.7–9.3)
NEUTROPHILS # BLD AUTO: 5.35 K/UL — SIGNIFICANT CHANGE UP (ref 1.4–6.5)
NEUTROPHILS NFR BLD AUTO: 60.9 % — SIGNIFICANT CHANGE UP (ref 42.2–75.2)
NITRITE UR-MCNC: NEGATIVE — SIGNIFICANT CHANGE UP
NRBC # BLD: 0 /100 WBCS — SIGNIFICANT CHANGE UP (ref 0–0)
NT-PROBNP SERPL-SCNC: 1779 PG/ML — HIGH (ref 0–300)
PCO2 BLDV: 47 MMHG — HIGH (ref 39–42)
PH BLDV: 7.44 — HIGH (ref 7.32–7.43)
PH UR: 6.5 — SIGNIFICANT CHANGE UP (ref 5–8)
PLATELET # BLD AUTO: 389 K/UL — SIGNIFICANT CHANGE UP (ref 130–400)
PO2 BLDV: 59 MMHG — SIGNIFICANT CHANGE UP
POTASSIUM BLDV-SCNC: 4.2 MMOL/L — SIGNIFICANT CHANGE UP (ref 3.5–5.1)
POTASSIUM SERPL-MCNC: 4.5 MMOL/L — SIGNIFICANT CHANGE UP (ref 3.5–5)
POTASSIUM SERPL-MCNC: 5 MMOL/L — SIGNIFICANT CHANGE UP (ref 3.5–5)
POTASSIUM SERPL-SCNC: 4.5 MMOL/L — SIGNIFICANT CHANGE UP (ref 3.5–5)
POTASSIUM SERPL-SCNC: 5 MMOL/L — SIGNIFICANT CHANGE UP (ref 3.5–5)
PROT SERPL-MCNC: 7.1 G/DL — SIGNIFICANT CHANGE UP (ref 6–8)
PROT UR-MCNC: ABNORMAL
RBC # BLD: 3.54 M/UL — LOW (ref 4.2–5.4)
RBC # FLD: 16 % — HIGH (ref 11.5–14.5)
RBC CASTS # UR COMP ASSIST: 1 /HPF — SIGNIFICANT CHANGE UP (ref 0–4)
RSV RNA NPH QL NAA+NON-PROBE: SIGNIFICANT CHANGE UP
SAO2 % BLDV: 91.5 % — SIGNIFICANT CHANGE UP
SARS-COV-2 RNA SPEC QL NAA+PROBE: SIGNIFICANT CHANGE UP
SODIUM SERPL-SCNC: 133 MMOL/L — LOW (ref 135–146)
SODIUM SERPL-SCNC: 133 MMOL/L — LOW (ref 135–146)
SP GR SPEC: 1.01 — SIGNIFICANT CHANGE UP (ref 1.01–1.03)
TROPONIN T SERPL-MCNC: 0.03 NG/ML — CRITICAL HIGH
TROPONIN T SERPL-MCNC: 0.03 NG/ML — CRITICAL HIGH
TROPONIN T SERPL-MCNC: 0.04 NG/ML — CRITICAL HIGH
UROBILINOGEN FLD QL: SIGNIFICANT CHANGE UP
WBC # BLD: 8.78 K/UL — SIGNIFICANT CHANGE UP (ref 4.8–10.8)
WBC # FLD AUTO: 8.78 K/UL — SIGNIFICANT CHANGE UP (ref 4.8–10.8)
WBC UR QL: 41 /HPF — HIGH (ref 0–5)

## 2023-04-03 PROCEDURE — 83735 ASSAY OF MAGNESIUM: CPT

## 2023-04-03 PROCEDURE — 93010 ELECTROCARDIOGRAM REPORT: CPT | Mod: 77

## 2023-04-03 PROCEDURE — 99285 EMERGENCY DEPT VISIT HI MDM: CPT

## 2023-04-03 PROCEDURE — 87640 STAPH A DNA AMP PROBE: CPT

## 2023-04-03 PROCEDURE — 92610 EVALUATE SWALLOWING FUNCTION: CPT | Mod: GN

## 2023-04-03 PROCEDURE — 93010 ELECTROCARDIOGRAM REPORT: CPT

## 2023-04-03 PROCEDURE — C9113: CPT

## 2023-04-03 PROCEDURE — 0241U: CPT

## 2023-04-03 PROCEDURE — 94640 AIRWAY INHALATION TREATMENT: CPT

## 2023-04-03 PROCEDURE — 74018 RADEX ABDOMEN 1 VIEW: CPT

## 2023-04-03 PROCEDURE — 94760 N-INVAS EAR/PLS OXIMETRY 1: CPT

## 2023-04-03 PROCEDURE — 93306 TTE W/DOPPLER COMPLETE: CPT

## 2023-04-03 PROCEDURE — 99223 1ST HOSP IP/OBS HIGH 75: CPT

## 2023-04-03 PROCEDURE — 87449 NOS EACH ORGANISM AG IA: CPT

## 2023-04-03 PROCEDURE — 85027 COMPLETE CBC AUTOMATED: CPT

## 2023-04-03 PROCEDURE — 82803 BLOOD GASES ANY COMBINATION: CPT

## 2023-04-03 PROCEDURE — 97162 PT EVAL MOD COMPLEX 30 MIN: CPT | Mod: GP

## 2023-04-03 PROCEDURE — 85025 COMPLETE CBC W/AUTO DIFF WBC: CPT

## 2023-04-03 PROCEDURE — 82962 GLUCOSE BLOOD TEST: CPT

## 2023-04-03 PROCEDURE — 80048 BASIC METABOLIC PNL TOTAL CA: CPT

## 2023-04-03 PROCEDURE — U0005: CPT

## 2023-04-03 PROCEDURE — 36415 COLL VENOUS BLD VENIPUNCTURE: CPT

## 2023-04-03 PROCEDURE — 80053 COMPREHEN METABOLIC PANEL: CPT

## 2023-04-03 PROCEDURE — 78582 LUNG VENTILAT&PERFUS IMAGING: CPT

## 2023-04-03 PROCEDURE — 71045 X-RAY EXAM CHEST 1 VIEW: CPT | Mod: 26

## 2023-04-03 PROCEDURE — 93976 VASCULAR STUDY: CPT

## 2023-04-03 PROCEDURE — 87641 MR-STAPH DNA AMP PROBE: CPT

## 2023-04-03 PROCEDURE — A9539: CPT

## 2023-04-03 PROCEDURE — 86850 RBC ANTIBODY SCREEN: CPT

## 2023-04-03 PROCEDURE — 93970 EXTREMITY STUDY: CPT | Mod: 26

## 2023-04-03 PROCEDURE — 93005 ELECTROCARDIOGRAM TRACING: CPT

## 2023-04-03 PROCEDURE — 99497 ADVNCD CARE PLAN 30 MIN: CPT | Mod: 25

## 2023-04-03 PROCEDURE — 71045 X-RAY EXAM CHEST 1 VIEW: CPT

## 2023-04-03 PROCEDURE — A9540: CPT

## 2023-04-03 PROCEDURE — 84484 ASSAY OF TROPONIN QUANT: CPT

## 2023-04-03 PROCEDURE — 71275 CT ANGIOGRAPHY CHEST: CPT

## 2023-04-03 PROCEDURE — 86900 BLOOD TYPING SEROLOGIC ABO: CPT

## 2023-04-03 PROCEDURE — 83605 ASSAY OF LACTIC ACID: CPT

## 2023-04-03 PROCEDURE — 84145 PROCALCITONIN (PCT): CPT

## 2023-04-03 PROCEDURE — 82553 CREATINE MB FRACTION: CPT

## 2023-04-03 PROCEDURE — 87899 AGENT NOS ASSAY W/OPTIC: CPT

## 2023-04-03 PROCEDURE — 93970 EXTREMITY STUDY: CPT

## 2023-04-03 PROCEDURE — 86901 BLOOD TYPING SEROLOGIC RH(D): CPT

## 2023-04-03 PROCEDURE — 0225U NFCT DS DNA&RNA 21 SARSCOV2: CPT

## 2023-04-03 PROCEDURE — 84443 ASSAY THYROID STIM HORMONE: CPT

## 2023-04-03 PROCEDURE — U0003: CPT

## 2023-04-03 PROCEDURE — 71275 CT ANGIOGRAPHY CHEST: CPT | Mod: 26

## 2023-04-03 RX ORDER — PANTOPRAZOLE SODIUM 20 MG/1
40 TABLET, DELAYED RELEASE ORAL
Refills: 0 | Status: DISCONTINUED | OUTPATIENT
Start: 2023-04-03 | End: 2023-04-13

## 2023-04-03 RX ORDER — INSULIN LISPRO 100/ML
VIAL (ML) SUBCUTANEOUS
Refills: 0 | Status: DISCONTINUED | OUTPATIENT
Start: 2023-04-03 | End: 2023-04-19

## 2023-04-03 RX ORDER — SODIUM CHLORIDE 9 MG/ML
1000 INJECTION INTRAMUSCULAR; INTRAVENOUS; SUBCUTANEOUS
Refills: 0 | Status: DISCONTINUED | OUTPATIENT
Start: 2023-04-03 | End: 2023-04-04

## 2023-04-03 RX ORDER — BUDESONIDE AND FORMOTEROL FUMARATE DIHYDRATE 160; 4.5 UG/1; UG/1
2 AEROSOL RESPIRATORY (INHALATION)
Refills: 0 | Status: DISCONTINUED | OUTPATIENT
Start: 2023-04-03 | End: 2023-04-19

## 2023-04-03 RX ORDER — CEFEPIME 1 G/1
2000 INJECTION, POWDER, FOR SOLUTION INTRAMUSCULAR; INTRAVENOUS ONCE
Refills: 0 | Status: COMPLETED | OUTPATIENT
Start: 2023-04-03 | End: 2023-04-03

## 2023-04-03 RX ORDER — INSULIN LISPRO 100/ML
3 VIAL (ML) SUBCUTANEOUS
Refills: 0 | Status: DISCONTINUED | OUTPATIENT
Start: 2023-04-03 | End: 2023-04-19

## 2023-04-03 RX ORDER — INSULIN LISPRO 100/ML
VIAL (ML) SUBCUTANEOUS
Refills: 0 | Status: DISCONTINUED | OUTPATIENT
Start: 2023-04-03 | End: 2023-04-03

## 2023-04-03 RX ORDER — SUCRALFATE 1 G
1 TABLET ORAL
Refills: 0 | Status: DISCONTINUED | OUTPATIENT
Start: 2023-04-03 | End: 2023-04-19

## 2023-04-03 RX ORDER — LISINOPRIL 2.5 MG/1
10 TABLET ORAL DAILY
Refills: 0 | Status: DISCONTINUED | OUTPATIENT
Start: 2023-04-03 | End: 2023-04-07

## 2023-04-03 RX ORDER — DEXTROSE 50 % IN WATER 50 %
25 SYRINGE (ML) INTRAVENOUS ONCE
Refills: 0 | Status: DISCONTINUED | OUTPATIENT
Start: 2023-04-03 | End: 2023-04-19

## 2023-04-03 RX ORDER — AMLODIPINE BESYLATE 2.5 MG/1
2.5 TABLET ORAL DAILY
Refills: 0 | Status: DISCONTINUED | OUTPATIENT
Start: 2023-04-03 | End: 2023-04-19

## 2023-04-03 RX ORDER — HEPARIN SODIUM 5000 [USP'U]/ML
5000 INJECTION INTRAVENOUS; SUBCUTANEOUS EVERY 12 HOURS
Refills: 0 | Status: DISCONTINUED | OUTPATIENT
Start: 2023-04-03 | End: 2023-04-04

## 2023-04-03 RX ORDER — ATORVASTATIN CALCIUM 80 MG/1
20 TABLET, FILM COATED ORAL AT BEDTIME
Refills: 0 | Status: DISCONTINUED | OUTPATIENT
Start: 2023-04-03 | End: 2023-04-19

## 2023-04-03 RX ORDER — IPRATROPIUM/ALBUTEROL SULFATE 18-103MCG
3 AEROSOL WITH ADAPTER (GRAM) INHALATION
Refills: 0 | Status: COMPLETED | OUTPATIENT
Start: 2023-04-03 | End: 2023-04-03

## 2023-04-03 RX ORDER — DEXTROSE 50 % IN WATER 50 %
12.5 SYRINGE (ML) INTRAVENOUS ONCE
Refills: 0 | Status: DISCONTINUED | OUTPATIENT
Start: 2023-04-03 | End: 2023-04-19

## 2023-04-03 RX ORDER — SODIUM CHLORIDE 9 MG/ML
1000 INJECTION, SOLUTION INTRAVENOUS
Refills: 0 | Status: DISCONTINUED | OUTPATIENT
Start: 2023-04-03 | End: 2023-04-19

## 2023-04-03 RX ORDER — AZITHROMYCIN 500 MG/1
500 TABLET, FILM COATED ORAL ONCE
Refills: 0 | Status: COMPLETED | OUTPATIENT
Start: 2023-04-03 | End: 2023-04-03

## 2023-04-03 RX ORDER — METRONIDAZOLE 500 MG
500 TABLET ORAL ONCE
Refills: 0 | Status: COMPLETED | OUTPATIENT
Start: 2023-04-03 | End: 2023-04-03

## 2023-04-03 RX ORDER — GLUCAGON INJECTION, SOLUTION 0.5 MG/.1ML
1 INJECTION, SOLUTION SUBCUTANEOUS ONCE
Refills: 0 | Status: DISCONTINUED | OUTPATIENT
Start: 2023-04-03 | End: 2023-04-19

## 2023-04-03 RX ORDER — INSULIN GLARGINE 100 [IU]/ML
10 INJECTION, SOLUTION SUBCUTANEOUS EVERY MORNING
Refills: 0 | Status: DISCONTINUED | OUTPATIENT
Start: 2023-04-03 | End: 2023-04-19

## 2023-04-03 RX ORDER — INSULIN GLARGINE 100 [IU]/ML
9 INJECTION, SOLUTION SUBCUTANEOUS AT BEDTIME
Refills: 0 | Status: DISCONTINUED | OUTPATIENT
Start: 2023-04-03 | End: 2023-04-03

## 2023-04-03 RX ORDER — IPRATROPIUM/ALBUTEROL SULFATE 18-103MCG
3 AEROSOL WITH ADAPTER (GRAM) INHALATION EVERY 6 HOURS
Refills: 0 | Status: DISCONTINUED | OUTPATIENT
Start: 2023-04-03 | End: 2023-04-07

## 2023-04-03 RX ORDER — DEXTROSE 50 % IN WATER 50 %
15 SYRINGE (ML) INTRAVENOUS ONCE
Refills: 0 | Status: DISCONTINUED | OUTPATIENT
Start: 2023-04-03 | End: 2023-04-19

## 2023-04-03 RX ADMIN — Medication 2: at 18:31

## 2023-04-03 RX ADMIN — Medication 125 MILLIGRAM(S): at 04:02

## 2023-04-03 RX ADMIN — CEFEPIME 100 MILLIGRAM(S): 1 INJECTION, POWDER, FOR SOLUTION INTRAMUSCULAR; INTRAVENOUS at 06:13

## 2023-04-03 RX ADMIN — Medication 3 UNIT(S): at 12:57

## 2023-04-03 RX ADMIN — Medication 12: at 09:04

## 2023-04-03 RX ADMIN — Medication 500 MILLIGRAM(S): at 06:40

## 2023-04-03 RX ADMIN — Medication 1 GRAM(S): at 18:39

## 2023-04-03 RX ADMIN — INSULIN GLARGINE 10 UNIT(S): 100 INJECTION, SOLUTION SUBCUTANEOUS at 12:58

## 2023-04-03 RX ADMIN — SODIUM CHLORIDE 50 MILLILITER(S): 9 INJECTION INTRAMUSCULAR; INTRAVENOUS; SUBCUTANEOUS at 13:03

## 2023-04-03 RX ADMIN — PANTOPRAZOLE SODIUM 40 MILLIGRAM(S): 20 TABLET, DELAYED RELEASE ORAL at 18:39

## 2023-04-03 RX ADMIN — Medication 3 MILLILITER(S): at 04:02

## 2023-04-03 RX ADMIN — Medication 3 UNIT(S): at 18:32

## 2023-04-03 RX ADMIN — Medication 2: at 12:58

## 2023-04-03 RX ADMIN — SODIUM CHLORIDE 50 MILLILITER(S): 9 INJECTION INTRAMUSCULAR; INTRAVENOUS; SUBCUTANEOUS at 18:34

## 2023-04-03 RX ADMIN — Medication 3 MILLILITER(S): at 18:39

## 2023-04-03 RX ADMIN — AZITHROMYCIN 255 MILLIGRAM(S): 500 TABLET, FILM COATED ORAL at 06:13

## 2023-04-03 RX ADMIN — CEFEPIME 2000 MILLIGRAM(S): 1 INJECTION, POWDER, FOR SOLUTION INTRAMUSCULAR; INTRAVENOUS at 06:50

## 2023-04-03 RX ADMIN — Medication 3 MILLILITER(S): at 03:43

## 2023-04-03 RX ADMIN — Medication 30 MILLILITER(S): at 10:59

## 2023-04-03 RX ADMIN — Medication 3 MILLILITER(S): at 04:04

## 2023-04-03 RX ADMIN — Medication 100 MILLIGRAM(S): at 05:50

## 2023-04-03 RX ADMIN — HEPARIN SODIUM 5000 UNIT(S): 5000 INJECTION INTRAVENOUS; SUBCUTANEOUS at 18:39

## 2023-04-03 RX ADMIN — ATORVASTATIN CALCIUM 20 MILLIGRAM(S): 80 TABLET, FILM COATED ORAL at 22:48

## 2023-04-03 NOTE — SWALLOW BEDSIDE ASSESSMENT ADULT - COMMENTS
RN reports toleration of current diet and medications.    Known to ST service this admission, seen on 3/22/23 with recs for a minced & moist diet w/thin liquids.

## 2023-04-03 NOTE — ED PROVIDER NOTE - DIFFERENTIAL DIAGNOSIS
cardiac ischemia, cardiac arrhythmia, acute coronary syndromes, symptomatic anemia, electrolyte abnormalities including hyponatremia and hypokalemia, CHF, and pneumothorax and Pneumonia. Pulmonary embolism and aortic dissection are less likely. Differential Diagnosis

## 2023-04-03 NOTE — ED ADULT TRIAGE NOTE - CHIEF COMPLAINT QUOTE
BIBA with complaints of cough, shortness of breath and low oxygen level, sent from [facility]

## 2023-04-03 NOTE — H&P ADULT - CONVERSATION DETAILS
LETICIA was discussed with daughters Meena Powell and Chanelle River at bedside. They reiterated that patient would not want to be resuscitated or intubated.  They also stated that they make joint decisions for the patient if she is not able to

## 2023-04-03 NOTE — ED PROVIDER NOTE - OBJECTIVE STATEMENT
89 yold female to Ed Pmhx Htn, Hld, Ckd, emphysema, gerd, Dm; pt biba from [facility] for difficulty breathing, coughing,chest pain and hypoxia; pt currently not home O2 dep - noted to have PO RA 85 at facility; pt s/p recently admission for chronic pneumonia and chronic abdominal pain(gerd); endoscopy no done in hospital due to high morbidity; pt currenlty also on abx cefpidoxime at nursing home;  pt deneis fever, chills, sore throat, difficutly swallowing abdominal pain or urinary sx;

## 2023-04-03 NOTE — ED PROVIDER NOTE - PHYSICAL EXAMINATION
Constitutional: Well developed, well nourished. NAD  Head: Normocephalic, atraumatic.  Eyes: PERRL, EOMI.  ENT: No nasal discharge. Mucous membranes dry.  Neck: Supple. Painless ROM.  Cardiovascular:  Regular rate and rhythm. .  Pulmonary: decreased breath sounds 2t2 emphysema; mild exp wheeze;  Abdominal: Soft. Nondistended. No rebound, guarding, rigidity.  Extremities. Pelvis stable. No lower extremity edema, symmetric calves.  Skin: No rashes, cyanosis.  Neuro: AAOx3. No focal neurological deficits.  Psych: Normal mood. Normal affect.

## 2023-04-03 NOTE — H&P ADULT - HISTORY OF PRESENT ILLNESS
89 year old female withPMHx of HTN, DM, CKD, former smoker, recent admission for aspiration PNA with incidental finding of distal DVT presented to the ED for hypoxia.   89 year old female withPMHx of HTN, DM, CKD, active smoker, COPD not on home O2, recent admission for aspiration PNA with incidental finding of distal DVT presented to the ED for hypoxia. Patient is limited historian, history obtained from charts and by daughters at bedside. They report that patient has been in usual health since her discharge on 3/25 until this morning when she was noted to be short of breath and coughing more than usual. She has small amount of white phlegm. She was hypoxic to 85% on RA and was sent to the ED. Denies any fevers, chills, chest pain, abdominal pain, nausea, vomiting, diarrhea, constipation. Patient completed Vantin course on 3/30/23.     In the ED, VS noted for T 36.9, HR 76, /72, RR 19, SpO2 98% on 4L NC. Labs noted for WBC 8, Hg 10, Cr 1.4, Glu 157, Ca 10.7 (corrected 11.6), Trop 0.04, BNP 1779.   CXR shows worsening R>L opacities.

## 2023-04-03 NOTE — ED ADULT NURSE NOTE - CAS EDP DISCH DISPOSITION ADMI
Preventive Health Recommendations  Female Ages 21 to 25     Yearly exam:     See your health care provider every year in order to  o Review health changes.   o Discuss preventive care.    o Review your medicines if your doctor has prescribed any.      You should be tested each year for STDs (sexually transmitted diseases).       Talk to your provider about how often you should have cholesterol testing.      Get a Pap test every three years. If you have an abnormal result, your doctor may have you test more often.      If you are at risk for diabetes, you should have a diabetes test (fasting glucose).     Shots:     Get a flu shot each year.     Get a tetanus shot every 10 years.     Consider getting the shot (vaccine) that prevents cervical cancer (Gardasil).    Nutrition:     Eat at least 5 servings of fruits and vegetables each day.    Eat whole-grain bread, whole-wheat pasta and brown rice instead of white grains and rice.    Get adequate Calcium and Vitamin D.     Lifestyle    Exercise at least 150 minutes a week each week (30 minutes a day, 5 days a week). This will help you control your weight and prevent disease.    Limit alcohol to one drink per day.    No smoking.     Wear sunscreen to prevent skin cancer.    See your dentist every six months for an exam and cleaning.  
SDU/Telemetry

## 2023-04-03 NOTE — PATIENT PROFILE ADULT - FALL HARM RISK - HARM RISK INTERVENTIONS

## 2023-04-03 NOTE — H&P ADULT - NSHPPHYSICALEXAM_GEN_ALL_CORE
LOS:     VITALS:   T(C): 36.9 (04-03-23 @ 07:10), Max: 36.9 (04-03-23 @ 07:10)  HR: 76 (04-03-23 @ 07:10) (76 - 110)  BP: 138/72 (04-03-23 @ 07:10) (138/72 - 149/79)  RR: 19 (04-03-23 @ 07:10) (19 - 22)  SpO2: 98% (04-03-23 @ 07:10) (97% - 98%)    GENERAL: NAD, lying in bed comfortably  HEAD:  Atraumatic, Normocephalic  EYES: EOMI, PERRLA, conjunctiva and sclera clear  ENT: Moist mucous membranes  NECK: Supple, No JVD  CHEST/LUNG: Clear to auscultation bilaterally; No rales, rhonchi, wheezing, or rubs. Unlabored respirations  HEART: Regular rate and rhythm; No murmurs, rubs, or gallops  ABDOMEN: BSx4; Soft, nontender, nondistended  EXTREMITIES:  2+ Peripheral Pulses, brisk capillary refill. No clubbing, cyanosis, or edema  NERVOUS SYSTEM:  A&Ox3, no focal deficits   SKIN: No rashes or lesions VITALS:   T(C): 36.9 (04-03-23 @ 07:10), Max: 36.9 (04-03-23 @ 07:10)  HR: 76 (04-03-23 @ 07:10) (76 - 110)  BP: 138/72 (04-03-23 @ 07:10) (138/72 - 149/79)  RR: 19 (04-03-23 @ 07:10) (19 - 22)  SpO2: 98% (04-03-23 @ 07:10) (97% - 98%)    GENERAL: NAD, lying in bed comfortably  HEAD:  Atraumatic, Normocephalic  EYES: EOMI, PERRLA, conjunctiva and sclera clear  CHEST/LUNG: harsh rhonchi heard bilaterally, no wheezing noted   HEART: Regular rate and rhythm; No murmurs, rubs, or gallops  ABDOMEN: BSx4; Soft, nontender, nondistended  EXTREMITIES: no LE edema   NERVOUS SYSTEM:  A&Ox2

## 2023-04-03 NOTE — ED ADULT NURSE NOTE - CHIEF COMPLAINT QUOTE
cognitive/neuromuscular/musculoskeletal BIBA with complaints of cough, shortness of breath and low oxygen level, sent from [facility] cognitive/neuromuscular/musculoskeletal

## 2023-04-03 NOTE — SWALLOW BEDSIDE ASSESSMENT ADULT - NS SPL SWALLOW CLINIC TRIAL FT
+tolerance for soft & bite sized diet with thin liquids without overt s/s of penetration/ aspiration.

## 2023-04-03 NOTE — SWALLOW BEDSIDE ASSESSMENT ADULT - SLP PERTINENT HISTORY OF CURRENT PROBLEM
89 year old female with PMHx of HTN, DM, CKD, active smoker, COPD not on home O2 recent admission for aspiration PNA with incidental finding of distal DVT. Now presented to the ED for hypoxia. Admitted with Acute Hypoxic Respiratory Failure likely secondary to aspiration PNA vs COPD exacerbation. CXR: Bilateral opacifications with focality in the left lower lung.

## 2023-04-03 NOTE — H&P ADULT - ATTENDING COMMENTS
89 year old female withPMHx of HTN, DM, CKD, active smoker, COPD not on home O2 recent admission for aspiration PNA with incidental finding of distal DVT presented to the ED for hypoxia    #Acute Hypoxic Respiratory Failure  #COPD Exacerbation  #Hx of Distal DVT  #Hx of aspiration pneumonia  on 4L NC saturating 100%  CXR showing bialteral interstitial opacifications  - Cont IV solumedrol 40mg once a day  - Monitor off antibiotics, f/u procalcitonin  - Duonebs q6h ATC and PRN  - CT PE protocol  - S/S eval  - Wean off O2  - Ambulating pulse ox prior to DC    #Dyspepsia  #Peptic Ulcer Disease  #Severe GERD  - continue with protonix BID, and Carafate   - endoscopy was not performed before due to age and comorbidities   - GI f/u OP on Wednesday  - maalox PRN    #CKD stage 3  - Cr  1.4 on admission   - Monitor    #Elevated Troponins  - Trops 0.04 (previously 0.04 > 0.03)  - no chest pain    #Normocytic Anemia  - Hg 10.4 (unknown baseline)  - keep active T&S, transfuse if Hg<7    #HTN  - was on amlodipine 2.5 mg daily, HCTZ 12.5mg daily and lisinopril 10mg daily in assisted living   - DC HCTZ  - continue with lisinopril and amlodipine    #DM2  - Cont insulin regimen    DVT PPX, heparin    #Progress Note Handoff  Pending (specify): Cont IV steroids, CT PE protocol,   Family discussion: dw pt and family regarding eval for cough, tx for COPD exacerbation  Disposition: Home

## 2023-04-03 NOTE — H&P ADULT - ASSESSMENT
#Dyspepsia  #Peptic Ulcer Disease  - was taking Protonix 40mg daily at home with no response  - c/w protonix BID (improved with this)  - endoscopy was not performed before because of her advanced age  - GI f/u OP    #CHRIS Vs progressive CKD, resolving  #Hypertonic hyponatremia  - Cr 2.7 (unknown baseline) tending down to 2.2   -RBUS- previous renal cysts similar, 429cc in bladder, no hydro  -f/u post void bladder scan      #Troponinemia, resolving - likely 2/2 kidney dysfunction  - trops 0.04-->0.03  - no chest pain  - EKG showed no ischemic changes    #Normocytic Anemia - 2/2 CKD?  - HgB 10.4 (unknown baseline)  - MCV 88  - F/u iron studies, b12, folate    #HTN  - takes amlodipine 2.5 mg daily, HCTZ 12.5mg daily and lisinopril 10mg daily  - hold anti-HTNsive meds for now as BP is soft and restart as needed    #DM  - takes pioglitazone 15mg daily  - c/w insulin lantus 8U and sliding scale 89 year old female withPMHx of HTN, DM, CKD, active smoker, COPD not on home O2 recent admission for aspiration PNA with incidental finding of distal DVT presented to the ED for hypoxia    #Acute Hypoxic Respiratory Failure likely secondary to aspiration PNA   #Active Smoker   #COPD/emphysema not on home O2  #Recent Distal LLE DVT   - 1 day of SOB, productive cough  - CXR shows worsening R>L opacities, f/u official read   - currently on 4L NC, saturating 100%  - WBC 8, Lactate 1.6, VBG pCO2: 47  - Trop 0.04, BNP 1779  - s/p Azithro, Cefepime, flagyl in ED; continue with Rocephin and Flagyl (previously had significant diarrhea with levaquin and PCN allergy)  - f/u procal, strep, legionella, MRSA   - f/u LE duplex   - as per last pulm eval: has significant emphysema on CT and likely significant COPD ; unlikely to be able to perform PFTs   - s/p solumedrol 125mg in ED, hold steroids for now given no wheezing noted   - wean O2 to 89-92%    #Dyspepsia  #Peptic Ulcer Disease  - was taking Protonix 40mg daily at home with no response  - c/w protonix BID (improved with this)  - endoscopy was not performed before because of her advanced age  - GI f/u OP    #CKD   #Hypertonic hyponatremia  - Cr  1.4 on admission   - was following with Nephrology in [place]    #Troponinemia  - Trops 0.04 (previously 0.04 > 0.03)  - no chest pain  - EKG     #Normocytic Anemia  - Hg 10.4 (unknown baseline)    #HTN  - takes amlodipine 2.5 mg daily, HCTZ 12.5mg daily and lisinopril 10mg daily  - hold anti-HTNsive meds for now as BP is soft and restart as needed    #DM  - takes pioglitazone 15mg daily  - c/w insulin lantus 8U and sliding scale 89 year old female withPMHx of HTN, DM, CKD, active smoker, COPD not on home O2 recent admission for aspiration PNA with incidental finding of distal DVT presented to the ED for hypoxia    #Acute Hypoxic Respiratory Failure likely secondary to aspiration PNA   #Active Smoker   #COPD/emphysema not on home O2  #Recent Distal LLE DVT   - 1 day of SOB, productive cough  - CXR shows worsening R>L opacities, f/u official read   - currently on 4L NC, saturating 100%  - WBC 8, Lactate 1.6, VBG pCO2: 47  - Trop 0.04, BNP 1779  - s/p Azithro, Cefepime, flagyl in ED; continue with Rocephin and Flagyl (previously had significant diarrhea with levaquin and PCN allergy)  - RVP negative  f/u procal, strep, legionella, MRSA   - f/u LE duplex ; consider CT angio r/o PE  - as per last pulm eval: has significant emphysema on CT and likely significant COPD ; unlikely to be able to perform PFTs   - s/p solumedrol 125mg in ED, hold steroids for now given no wheezing noted   - wean O2 to 89-92%  - as per family, this is patient's 3rd admission for aspiration PNA in past 4 months   - S&S eval     #LLE distal DVT   - Duplex (3/24/23) noted thrombus in left gastrocnemius and peroneal veins  - evaluated by Vascular; no need for AC, repeat LE duplex in 1 week  - f/u LE duplex     #Dyspepsia  #Peptic Ulcer Disease  - continue with protonix BID, and Carafate   - endoscopy was not performed before due to age and comorbidities   - GI f/u OP on Wednesday    #CKD   - Cr  1.4 on admission   - was following with Nephrology in [place]    #Elevated Troponins  - Trops 0.04 (previously 0.04 > 0.03)  - no chest pain  - EKG     #Normocytic Anemia  - Hg 10.4 (unknown baseline)    #HTN  - was on amlodipine 2.5 mg daily, HCTZ 12.5mg daily and lisinopril 10mg daily but stopped during lat admission for hypotension    #DM  - takes pioglitazone 15mg daily  - c/w insulin lantus 9U at bedtime, lispro 3U TID ; monitor FS  - A1c 7.4% (3/2023)    #DVT PPx: Heparin subq  #GI PPx: PPI  #Diet: Soft and Bite Sized  89 year old female withPMHx of HTN, DM, CKD, active smoker, COPD not on home O2 recent admission for aspiration PNA with incidental finding of distal DVT presented to the ED for hypoxia    #Acute Hypoxic Respiratory Failure likely secondary to aspiration PNA   #Active Smoker   #COPD/emphysema not on home O2  #Recent Distal LLE DVT   - 1 day of SOB, productive cough  - CXR shows worsening R>L opacities, f/u official read   - currently on 4L NC, saturating 100%  - WBC 8, Lactate 1.6, VBG pCO2: 47  - Trop 0.04, BNP 1779  - s/p Azithro, Cefepime, flagyl in ED; hold for now and f/u procal (previously had significant diarrhea with levaquin and PCN allergy)  - RVP negative  f/u procal, strep, legionella, MRSA   - f/u LE duplex ; consider CT angio r/o PE  - as per last pulm eval: has significant emphysema on CT and likely significant COPD ; unlikely to be able to perform PFTs   - s/p solumedrol 125mg in ED, continue with solumedrol 40mg daily   - wean O2 to 89-92%  - as per family, this is patient's 3rd admission for aspiration PNA in past 4 months   - S&S eval     #LLE distal DVT   - Duplex (3/24/23) noted thrombus in left gastrocnemius and peroneal veins  - evaluated by Vascular; no need for AC, repeat LE duplex in 1 week  - f/u LE duplex     #Hypercalcemia  - Ca 10.7 (corrected 11.6)  - hold HCTZ  - gentle hydration for 24hrs     #Dyspepsia  #Peptic Ulcer Disease  - continue with protonix BID, and Carafate   - endoscopy was not performed before due to age and comorbidities   - GI f/u OP on Wednesday    #CKD   - Cr  1.4 on admission   - was following with Nephrology in [place]    #Elevated Troponins  - Trops 0.04 (previously 0.04 > 0.03)  - no chest pain    #Normocytic Anemia  - Hg 10.4 (unknown baseline)  - keep active T&S, transfuse if Hg<7    #HTN  - was on amlodipine 2.5 mg daily, HCTZ 12.5mg daily and lisinopril 10mg daily in assisted living   - hold HCTZ for hypercalcemia   - continue with lisinopril and amlodipine     #DM  - takes pioglitazone 15mg daily  - c/w insulin lantus 9U at bedtime, lispro 3U TID ; monitor FS  - A1c 7.4% (3/2023)    #DVT PPx: Heparin subq  #GI PPx: PPI  #Diet: Soft and Bite Sized  89 year old female withPMHx of HTN, DM, CKD, active smoker, COPD not on home O2 recent admission for aspiration PNA with incidental finding of distal DVT presented to the ED for hypoxia    #Acute Hypoxic Respiratory Failure likely secondary to aspiration PNA vs COPD exacerbation  #Active Smoker   #COPD/emphysema not on home O2  #Recent Distal LLE DVT   - 1 day of SOB, productive cough  - CXR shows worsening R>L opacities, f/u official read   - currently on 4L NC, saturating 100%  - WBC 8, Lactate 1.6, VBG pCO2: 47  - Trop 0.04, BNP 1779  - s/p Azithro, Cefepime, flagyl in ED; hold for now and f/u procal (previously had significant diarrhea with levaquin and PCN allergy)  - RVP negative  f/u procal, strep, legionella, MRSA   - hx of distal DVT, now hypoxic and tachycardic -  f/u LE duplex ; consider CT angio r/o PE  - as per last pulm eval: has significant emphysema on CT and likely significant COPD ; unlikely to be able to perform PFTs   - s/p solumedrol 125mg in ED, continue with solumedrol 40mg daily   - wean O2 to 89-92%  - as per family, this is patient's 3rd admission for aspiration PNA in past 4 months   - S&S eval     #LLE distal DVT   - Duplex (3/24/23) noted thrombus in left gastrocnemius and peroneal veins  - evaluated by Vascular; no need for AC, repeat LE duplex in 1 week  - f/u LE duplex     #Hypercalcemia  - Ca 10.7 (corrected 11.6)  - hold HCTZ  - gentle hydration for 24hrs     #Dyspepsia  #Peptic Ulcer Disease  - continue with protonix BID, and Carafate   - endoscopy was not performed before due to age and comorbidities   - GI f/u OP on Wednesday    #CKD   - Cr  1.4 on admission   - was following with Nephrology in [place]    #Elevated Troponins  - Trops 0.04 (previously 0.04 > 0.03)  - no chest pain    #Normocytic Anemia  - Hg 10.4 (unknown baseline)  - keep active T&S, transfuse if Hg<7    #HTN  - was on amlodipine 2.5 mg daily, HCTZ 12.5mg daily and lisinopril 10mg daily in assisted living   - hold HCTZ for hypercalcemia   - continue with lisinopril and amlodipine     #DM  - takes pioglitazone 15mg daily  - c/w insulin lantus 9U at bedtime, lispro 3U TID ; monitor FS  - A1c 7.4% (3/2023)    #DVT PPx: Heparin subq  #GI PPx: PPI  #Diet: Soft and Bite Sized   MED REC completed with NH chart 89 year old female withPMHx of HTN, DM, CKD, active smoker, COPD not on home O2 recent admission for aspiration PNA with incidental finding of distal DVT presented to the ED for hypoxia    #Acute Hypoxic Respiratory Failure likely secondary to aspiration PNA vs COPD exacerbation  #Active Smoker   #COPD/emphysema not on home O2  #Recent Distal LLE DVT   - 1 day of SOB, productive cough  - CXR shows worsening R>L opacities, f/u official read   - currently on 4L NC, saturating 100%  - WBC 8, Lactate 1.6, VBG pCO2: 47  - Trop 0.04, BNP 1779  - s/p Azithro, Cefepime, flagyl in ED; hold for now and f/u procal (previously had significant diarrhea with levaquin and PCN allergy)  - RVP negative  f/u procal, strep, legionella, MRSA   - hx of distal DVT, now hypoxic and tachycardic -  f/u LE duplex ; consider CT angio r/o PE  - as per last pulm eval: has significant emphysema on CT and likely significant COPD ; unlikely to be able to perform PFTs   - s/p solumedrol 125mg in ED, continue with solumedrol 40mg daily   - wean O2 to 89-92%  - as per family, this is patient's 3rd admission for aspiration PNA in past 4 months   - S&S eval     #LLE distal DVT   - Duplex (3/24/23) noted thrombus in left gastrocnemius and peroneal veins  - evaluated by Vascular; no need for AC, repeat LE duplex in 1 week  - f/u LE duplex     #Hypercalcemia  - Ca 10.7 (corrected 11.6)  - hold HCTZ  - gentle hydration for 24hrs     #Dyspepsia  #Peptic Ulcer Disease  - continue with protonix BID, and Carafate   - endoscopy was not performed before due to age and comorbidities   - GI f/u OP on Wednesday    #CKD   - Cr  1.4 on admission   - was following with Nephrology in [place]    #Elevated Troponins  - Trops 0.04 (previously 0.04 > 0.03)  - no chest pain    #Normocytic Anemia  - Hg 10.4 (unknown baseline)  - keep active T&S, transfuse if Hg<7    #HTN  - was on amlodipine 2.5 mg daily, HCTZ 12.5mg daily and lisinopril 10mg daily in assisted living   - hold HCTZ for hypercalcemia   - continue with lisinopril and amlodipine     #DM  - takes pioglitazone 15mg daily  - c/w insulin lantus 9U at bedtime, lispro 3U TID ; monitor FS  - A1c 7.4% (3/2023)    #DVT PPx: Heparin subq  #GI PPx: PPI  #Diet: Soft and Bite Sized   MED REC completed with NH chart  DNR/DNI

## 2023-04-03 NOTE — ED PROVIDER NOTE - CLINICAL SUMMARY MEDICAL DECISION MAKING FREE TEXT BOX
Laboratory results reviewed and discussed with patient. CBC shows anemia. BMP shows electrolytes WNL.  CXR shows no PTx, +B/L infiltrates, no free air.   EKG reviewed by me and shows sinus tachycardia, no significant ST/T wave changes noted.   Patient remained hemodynamically stable during the course of ED stay. Discussed with patient about the results of the diagnostic studies. Discussed with admitting physician and MAR, patient is admitted to Medicine for further evaluation and care.

## 2023-04-03 NOTE — ED PROVIDER NOTE - ATTENDING APP SHARED VISIT CONTRIBUTION OF CARE
Patient is sent from NH for evaluation of cough and worsening sob. Denies abd pain/nausea/vomiting.   Vital signs reviewed.   Lungs: B/L good air entry, B/L crackles.   Abd: +BS, NT, ND, soft,   A/P: Cough with sob,   r/o pneumonia,   CXR, Labs, EKG,   reevaluation.

## 2023-04-03 NOTE — ED PROVIDER NOTE - NS ED ROS FT
Constitutional: No fever, chills.  Eyes: No visual changes.  ENT: No hearing changes.  Neck: No neck pain or stiffness.  Cardiovascular: No chest pain, palpitations, edema.  Pulmonary: seehpi  Abdominal:  No nausea, vomiting, diarrhea.  : No dysuria, frequency.  Neuro: No headache, syncope, dizziness.  MS: No back pain. No calf pain/swelling.  Psych: No suicidal ideations.

## 2023-04-03 NOTE — H&P ADULT - NSHPLABSRESULTS_GEN_ALL_CORE
10.0   8.78  )-----------( 389      ( 2023 04:20 )             31.8     04-03    133<L>  |  93<L>  |  26<H>  ----------------------------<  157<H>  5.0   |  29  |  1.4    Ca    10.7<H>      2023 04:20    TPro  7.1  /  Alb  2.9<L>  /  TBili  0.2  /  DBili  x   /  AST  21  /  ALT  9   /  AlkPhos  125<H>  04-03        CARDIAC MARKERS ( 2023 04:20 )  x     / 0.04 ng/mL / x     / x     / x            LIVER FUNCTIONS - ( 2023 04:20 )  Alb: 2.9 g/dL / Pro: 7.1 g/dL / ALK PHOS: 125 U/L / ALT: 9 U/L / AST: 21 U/L / GGT: x                 Urinalysis Basic - ( 2023 05:15 )    Color: Light Yellow / Appearance: Clear / S.013 / pH: x  Gluc: x / Ketone: Negative  / Bili: Negative / Urobili: <2 mg/dL   Blood: x / Protein: 30 mg/dL / Nitrite: Negative   Leuk Esterase: Small / RBC: 1 /HPF / WBC 41 /HPF   Sq Epi: x / Non Sq Epi: 14 /HPF / Bacteria: Negative        RADIOLOGY & ADDITIONAL STUDIES:

## 2023-04-04 LAB
ALBUMIN SERPL ELPH-MCNC: 2.6 G/DL — LOW (ref 3.5–5.2)
ALP SERPL-CCNC: 105 U/L — SIGNIFICANT CHANGE UP (ref 30–115)
ALT FLD-CCNC: 8 U/L — SIGNIFICANT CHANGE UP (ref 0–41)
ANION GAP SERPL CALC-SCNC: 7 MMOL/L — SIGNIFICANT CHANGE UP (ref 7–14)
AST SERPL-CCNC: 11 U/L — SIGNIFICANT CHANGE UP (ref 0–41)
BASOPHILS # BLD AUTO: 0.03 K/UL — SIGNIFICANT CHANGE UP (ref 0–0.2)
BASOPHILS NFR BLD AUTO: 0.3 % — SIGNIFICANT CHANGE UP (ref 0–1)
BILIRUB SERPL-MCNC: <0.2 MG/DL — SIGNIFICANT CHANGE UP (ref 0.2–1.2)
BUN SERPL-MCNC: 35 MG/DL — HIGH (ref 10–20)
CALCIUM SERPL-MCNC: 9.6 MG/DL — SIGNIFICANT CHANGE UP (ref 8.4–10.4)
CHLORIDE SERPL-SCNC: 96 MMOL/L — LOW (ref 98–110)
CO2 SERPL-SCNC: 29 MMOL/L — SIGNIFICANT CHANGE UP (ref 17–32)
CREAT SERPL-MCNC: 1.5 MG/DL — SIGNIFICANT CHANGE UP (ref 0.7–1.5)
CULTURE RESULTS: SIGNIFICANT CHANGE UP
EGFR: 33 ML/MIN/1.73M2 — LOW
EOSINOPHIL # BLD AUTO: 0.03 K/UL — SIGNIFICANT CHANGE UP (ref 0–0.7)
EOSINOPHIL NFR BLD AUTO: 0.3 % — SIGNIFICANT CHANGE UP (ref 0–8)
GLUCOSE BLDC GLUCOMTR-MCNC: 114 MG/DL — HIGH (ref 70–99)
GLUCOSE BLDC GLUCOMTR-MCNC: 182 MG/DL — HIGH (ref 70–99)
GLUCOSE BLDC GLUCOMTR-MCNC: 240 MG/DL — HIGH (ref 70–99)
GLUCOSE SERPL-MCNC: 105 MG/DL — HIGH (ref 70–99)
HCT VFR BLD CALC: 24.8 % — LOW (ref 37–47)
HGB BLD-MCNC: 8 G/DL — LOW (ref 12–16)
IMM GRANULOCYTES NFR BLD AUTO: 0.8 % — HIGH (ref 0.1–0.3)
LYMPHOCYTES # BLD AUTO: 2.13 K/UL — SIGNIFICANT CHANGE UP (ref 1.2–3.4)
LYMPHOCYTES # BLD AUTO: 21.7 % — SIGNIFICANT CHANGE UP (ref 20.5–51.1)
MAGNESIUM SERPL-MCNC: 1.4 MG/DL — LOW (ref 1.8–2.4)
MCHC RBC-ENTMCNC: 28.8 PG — SIGNIFICANT CHANGE UP (ref 27–31)
MCHC RBC-ENTMCNC: 32.3 G/DL — SIGNIFICANT CHANGE UP (ref 32–37)
MCV RBC AUTO: 89.2 FL — SIGNIFICANT CHANGE UP (ref 81–99)
MONOCYTES # BLD AUTO: 0.7 K/UL — HIGH (ref 0.1–0.6)
MONOCYTES NFR BLD AUTO: 7.1 % — SIGNIFICANT CHANGE UP (ref 1.7–9.3)
MRSA PCR RESULT.: NEGATIVE — SIGNIFICANT CHANGE UP
NEUTROPHILS # BLD AUTO: 6.86 K/UL — HIGH (ref 1.4–6.5)
NEUTROPHILS NFR BLD AUTO: 69.8 % — SIGNIFICANT CHANGE UP (ref 42.2–75.2)
NRBC # BLD: 0 /100 WBCS — SIGNIFICANT CHANGE UP (ref 0–0)
PLATELET # BLD AUTO: 406 K/UL — HIGH (ref 130–400)
POTASSIUM SERPL-MCNC: 4.7 MMOL/L — SIGNIFICANT CHANGE UP (ref 3.5–5)
POTASSIUM SERPL-SCNC: 4.7 MMOL/L — SIGNIFICANT CHANGE UP (ref 3.5–5)
PROCALCITONIN SERPL-MCNC: 0.14 NG/ML — HIGH (ref 0.02–0.1)
PROT SERPL-MCNC: 6.2 G/DL — SIGNIFICANT CHANGE UP (ref 6–8)
RBC # BLD: 2.78 M/UL — LOW (ref 4.2–5.4)
RBC # FLD: 16 % — HIGH (ref 11.5–14.5)
SODIUM SERPL-SCNC: 132 MMOL/L — LOW (ref 135–146)
SPECIMEN SOURCE: SIGNIFICANT CHANGE UP
WBC # BLD: 9.83 K/UL — SIGNIFICANT CHANGE UP (ref 4.8–10.8)
WBC # FLD AUTO: 9.83 K/UL — SIGNIFICANT CHANGE UP (ref 4.8–10.8)

## 2023-04-04 PROCEDURE — 99291 CRITICAL CARE FIRST HOUR: CPT

## 2023-04-04 PROCEDURE — 99233 SBSQ HOSP IP/OBS HIGH 50: CPT

## 2023-04-04 PROCEDURE — G0316 PROLONG INPT EVAL ADD15 M: CPT

## 2023-04-04 RX ORDER — ENOXAPARIN SODIUM 100 MG/ML
50 INJECTION SUBCUTANEOUS EVERY 24 HOURS
Refills: 0 | Status: DISCONTINUED | OUTPATIENT
Start: 2023-04-04 | End: 2023-04-05

## 2023-04-04 RX ORDER — CEFEPIME 1 G/1
1000 INJECTION, POWDER, FOR SOLUTION INTRAMUSCULAR; INTRAVENOUS ONCE
Refills: 0 | Status: COMPLETED | OUTPATIENT
Start: 2023-04-04 | End: 2023-04-04

## 2023-04-04 RX ORDER — ACETAMINOPHEN 500 MG
650 TABLET ORAL EVERY 6 HOURS
Refills: 0 | Status: COMPLETED | OUTPATIENT
Start: 2023-04-04 | End: 2023-04-06

## 2023-04-04 RX ORDER — CHLORHEXIDINE GLUCONATE 213 G/1000ML
1 SOLUTION TOPICAL EVERY 12 HOURS
Refills: 0 | Status: COMPLETED | OUTPATIENT
Start: 2023-04-04 | End: 2023-04-05

## 2023-04-04 RX ORDER — MAGNESIUM SULFATE 500 MG/ML
2 VIAL (ML) INJECTION
Refills: 0 | Status: COMPLETED | OUTPATIENT
Start: 2023-04-04 | End: 2023-04-04

## 2023-04-04 RX ORDER — VANCOMYCIN HCL 1 G
750 VIAL (EA) INTRAVENOUS EVERY 12 HOURS
Refills: 0 | Status: DISCONTINUED | OUTPATIENT
Start: 2023-04-04 | End: 2023-04-06

## 2023-04-04 RX ORDER — CEFEPIME 1 G/1
1000 INJECTION, POWDER, FOR SOLUTION INTRAMUSCULAR; INTRAVENOUS EVERY 24 HOURS
Refills: 0 | Status: DISCONTINUED | OUTPATIENT
Start: 2023-04-05 | End: 2023-04-10

## 2023-04-04 RX ORDER — CEFEPIME 1 G/1
INJECTION, POWDER, FOR SOLUTION INTRAMUSCULAR; INTRAVENOUS
Refills: 0 | Status: DISCONTINUED | OUTPATIENT
Start: 2023-04-04 | End: 2023-04-10

## 2023-04-04 RX ADMIN — AMLODIPINE BESYLATE 2.5 MILLIGRAM(S): 2.5 TABLET ORAL at 06:18

## 2023-04-04 RX ADMIN — Medication 3 MILLILITER(S): at 14:54

## 2023-04-04 RX ADMIN — CHLORHEXIDINE GLUCONATE 1 APPLICATION(S): 213 SOLUTION TOPICAL at 17:41

## 2023-04-04 RX ADMIN — Medication 2: at 16:25

## 2023-04-04 RX ADMIN — Medication 650 MILLIGRAM(S): at 20:17

## 2023-04-04 RX ADMIN — LISINOPRIL 10 MILLIGRAM(S): 2.5 TABLET ORAL at 06:20

## 2023-04-04 RX ADMIN — Medication 1 GRAM(S): at 17:40

## 2023-04-04 RX ADMIN — Medication 650 MILLIGRAM(S): at 20:40

## 2023-04-04 RX ADMIN — Medication 3 UNIT(S): at 08:12

## 2023-04-04 RX ADMIN — CEFEPIME 100 MILLIGRAM(S): 1 INJECTION, POWDER, FOR SOLUTION INTRAMUSCULAR; INTRAVENOUS at 10:47

## 2023-04-04 RX ADMIN — BUDESONIDE AND FORMOTEROL FUMARATE DIHYDRATE 2 PUFF(S): 160; 4.5 AEROSOL RESPIRATORY (INHALATION) at 10:47

## 2023-04-04 RX ADMIN — Medication 3 UNIT(S): at 16:25

## 2023-04-04 RX ADMIN — Medication 40 MILLIGRAM(S): at 06:20

## 2023-04-04 RX ADMIN — PANTOPRAZOLE SODIUM 40 MILLIGRAM(S): 20 TABLET, DELAYED RELEASE ORAL at 06:20

## 2023-04-04 RX ADMIN — PANTOPRAZOLE SODIUM 40 MILLIGRAM(S): 20 TABLET, DELAYED RELEASE ORAL at 17:40

## 2023-04-04 RX ADMIN — Medication 1: at 11:32

## 2023-04-04 RX ADMIN — Medication 1 GRAM(S): at 06:20

## 2023-04-04 RX ADMIN — HEPARIN SODIUM 5000 UNIT(S): 5000 INJECTION INTRAVENOUS; SUBCUTANEOUS at 06:18

## 2023-04-04 RX ADMIN — ATORVASTATIN CALCIUM 20 MILLIGRAM(S): 80 TABLET, FILM COATED ORAL at 22:02

## 2023-04-04 RX ADMIN — ENOXAPARIN SODIUM 50 MILLIGRAM(S): 100 INJECTION SUBCUTANEOUS at 16:23

## 2023-04-04 RX ADMIN — Medication 25 GRAM(S): at 08:13

## 2023-04-04 RX ADMIN — Medication 3 UNIT(S): at 11:32

## 2023-04-04 RX ADMIN — Medication 250 MILLIGRAM(S): at 17:41

## 2023-04-04 RX ADMIN — INSULIN GLARGINE 10 UNIT(S): 100 INJECTION, SOLUTION SUBCUTANEOUS at 11:31

## 2023-04-04 RX ADMIN — Medication 25 GRAM(S): at 10:46

## 2023-04-04 RX ADMIN — Medication 3 MILLILITER(S): at 08:37

## 2023-04-04 NOTE — CONSULT NOTE ADULT - SUBJECTIVE AND OBJECTIVE BOX
Patient is a 89y old  Female who presents with a chief complaint of SOB (2023 09:14)      HPI:  89 year old female withPMHx of HTN, DM, CKD, active smoker, COPD not on home O2, recent admission for aspiration PNA with incidental finding of distal DVT presented to the ED for hypoxia. Patient is limited historian, history obtained from charts and by daughters at bedside. They report that patient has been in usual health since her discharge on 3/25 until this morning when she was noted to be short of breath and coughing more than usual. She has small amount of white phlegm. She was hypoxic to 85% on RA and was sent to the ED. Denies any fevers, chills, chest pain, abdominal pain, nausea, vomiting, diarrhea, constipation. Patient completed Vantin course on 3/30/23.     In the ED, VS noted for T 36.9, HR 76, /72, RR 19, SpO2 98% on 4L NC. Labs noted for WBC 8, Hg 10, Cr 1.4, Glu 157, Ca 10.7 (corrected 11.6), Trop 0.04, BNP 1779.   CXR shows worsening R>L opacities.  (2023 09:14)    Overnight events: On 3L NC.    PAST MEDICAL & SURGICAL HISTORY:  Hypertension  Diabetes mellitus  Chronic kidney disease, unspecified CKD stage  COPD  DVT      SOCIAL HX:   Current smoker    FAMILY HISTORY:  None    REVIEW OF SYSTEMS  As per HPI    Allergies  penicillin (Unknown)    Intolerances      albuterol/ipratropium for Nebulization 3 milliLiter(s) Nebulizer every 6 hours  aluminum hydroxide/magnesium hydroxide/simethicone Suspension 30 milliLiter(s) Oral every 4 hours PRN  amLODIPine   Tablet 2.5 milliGRAM(s) Oral daily  atorvastatin 20 milliGRAM(s) Oral at bedtime  budesonide  80 MICROgram(s)/formoterol 4.5 MICROgram(s) Inhaler 2 Puff(s) Inhalation two times a day  dextrose 5%. 1000 milliLiter(s) IV Continuous <Continuous>  dextrose 5%. 1000 milliLiter(s) IV Continuous <Continuous>  dextrose 50% Injectable 25 Gram(s) IV Push once  dextrose 50% Injectable 12.5 Gram(s) IV Push once  dextrose 50% Injectable 25 Gram(s) IV Push once  dextrose Oral Gel 15 Gram(s) Oral once PRN  glucagon  Injectable 1 milliGRAM(s) IntraMuscular once  heparin   Injectable 5000 Unit(s) SubCutaneous every 12 hours  insulin glargine Injectable (LANTUS) 10 Unit(s) SubCutaneous every morning  insulin lispro (ADMELOG) corrective regimen sliding scale   SubCutaneous three times a day before meals  insulin lispro Injectable (ADMELOG) 3 Unit(s) SubCutaneous three times a day before meals  lisinopril 10 milliGRAM(s) Oral daily  magnesium sulfate  IVPB 2 Gram(s) IV Intermittent every 2 hours  methylPREDNISolone sodium succinate Injectable 40 milliGRAM(s) IV Push daily  pantoprazole    Tablet 40 milliGRAM(s) Oral two times a day  sodium chloride 0.9%. 1000 milliLiter(s) IV Continuous <Continuous>  sucralfate 1 Gram(s) Oral two times a day  : Home Meds:      PHYSICAL EXAM  ICU Vital Signs Last 24 Hrs  T(C): 36.9 (2023 08:13), Max: 37.2 (2023 00:38)  T(F): 98.4 (2023 08:13), Max: 98.9 (2023 00:38)  HR: 91 (2023 08:13) (89 - 104)  BP: 164/75 (2023 08:13) (107/59 - 164/75)  BP(mean): 108 (2023 08:13) (108 - 108)  RR: 20 (2023 08:13) (18 - 20)  SpO2: 98% (2023 08:13) (98% - 100%)    O2 Parameters below as of 2023 08:13  Patient On (Oxygen Delivery Method): nasal cannula    General: ill appearing  HEENT:  TREVOR              Lungs: decreased BS bilaterally, bilateral crackles  Cardiovascular: Regular  Abdomen: Soft, Positive BS  Extremities: No clubbing  Skin: Warm  Neurological: Non focal       23 @ 07:01  -  23 @ 07:00  --------------------------------------------------------  IN:    sodium chloride 0.9%: 500 mL  Total IN: 500 mL    OUT:  Total OUT: 0 mL    Total NET: 500 mL      LABS:                          8.0    9.83  )-----------( 406      ( 2023 05:36 )             24.8     132<L>  |  96<L>  |  35<H>  ----------------------------<  105<H>  4.7   |  29  |  1.5    Ca    9.6      2023 05:36  Mg     1.4     04-04    TPro  6.2  /  Alb  2.6<L>  /  TBili  <0.2  /  DBili  x   /  AST  11  /  ALT  8   /  AlkPhos  105  04-04    Urinalysis Basic - ( 2023 05:15 )  Color: Light Yellow / Appearance: Clear / S.013 / pH: x  Gluc: x / Ketone: Negative  / Bili: Negative / Urobili: <2 mg/dL   Blood: x / Protein: 30 mg/dL / Nitrite: Negative   Leuk Esterase: Small / RBC: 1 /HPF / WBC 41 /HPF   Sq Epi: x / Non Sq Epi: 14 /HPF / Bacteria: Negative    CARDIAC MARKERS ( 2023 16:44 )  x     / 0.03 ng/mL / x     / x     / x      CARDIAC MARKERS ( 2023 11:42 )  x     / 0.03 ng/mL / x     / x     / x      CARDIAC MARKERS ( 2023 04:20 )  x     / 0.04 ng/mL / x     / x     / x         LIVER FUNCTIONS - ( 2023 05:36 )  Alb: 2.6 g/dL / Pro: 6.2 g/dL / ALK PHOS: 105 U/L / ALT: 8 U/L / AST: 11 U/L / GGT: x                                              Culture - Urine (collected 2023 05:15)  Source: Clean Catch Clean Catch (Midstream)  Final Report (2023 08:12):    >=3 organisms. Probable collection contamination.    X-Rays                                                                                     ECHO    MEDICATIONS  (STANDING):  albuterol/ipratropium for Nebulization 3 milliLiter(s) Nebulizer every 6 hours  amLODIPine   Tablet 2.5 milliGRAM(s) Oral daily  atorvastatin 20 milliGRAM(s) Oral at bedtime  budesonide  80 MICROgram(s)/formoterol 4.5 MICROgram(s) Inhaler 2 Puff(s) Inhalation two times a day  dextrose 5%. 1000 milliLiter(s) (50 mL/Hr) IV Continuous <Continuous>  dextrose 5%. 1000 milliLiter(s) (100 mL/Hr) IV Continuous <Continuous>  dextrose 50% Injectable 25 Gram(s) IV Push once  dextrose 50% Injectable 12.5 Gram(s) IV Push once  dextrose 50% Injectable 25 Gram(s) IV Push once  glucagon  Injectable 1 milliGRAM(s) IntraMuscular once  heparin   Injectable 5000 Unit(s) SubCutaneous every 12 hours  insulin glargine Injectable (LANTUS) 10 Unit(s) SubCutaneous every morning  insulin lispro (ADMELOG) corrective regimen sliding scale   SubCutaneous three times a day before meals  insulin lispro Injectable (ADMELOG) 3 Unit(s) SubCutaneous three times a day before meals  lisinopril 10 milliGRAM(s) Oral daily  magnesium sulfate  IVPB 2 Gram(s) IV Intermittent every 2 hours  methylPREDNISolone sodium succinate Injectable 40 milliGRAM(s) IV Push daily  pantoprazole    Tablet 40 milliGRAM(s) Oral two times a day  sodium chloride 0.9%. 1000 milliLiter(s) (50 mL/Hr) IV Continuous <Continuous>  sucralfate 1 Gram(s) Oral two times a day    MEDICATIONS  (PRN):  aluminum hydroxide/magnesium hydroxide/simethicone Suspension 30 milliLiter(s) Oral every 4 hours PRN Dyspepsia  dextrose Oral Gel 15 Gram(s) Oral once PRN Blood Glucose LESS THAN 70 milliGRAM(s)/deciliter Patient is a 89y old  Female who presents with a chief complaint of SOB (2023 09:14)      89 year old female with PMHx of HTN, DM, CKD, active smoker, COPD not on home O2, recent admission for aspiration PNA with incidental finding of distal DVT presented to the ED for hypoxia. Patient is limited historian, history obtained from charts and by daughters at bedside. They report that patient has been in usual health since her discharge on 3/25 until this morning when she was noted to be short of breath and coughing more than usual. She has small amount of white phlegm. She was hypoxic to 85% on RA and was sent to the ED. Denies any fevers, chills, chest pain, abdominal pain, nausea, vomiting, diarrhea, constipation. Patient completed Vantin course on 3/30/23.     In the ED, VS noted for T 36.9, HR 76, /72, RR 19, SpO2 98% on 4L NC. Labs noted for WBC 8, Hg 10, Cr 1.4, Glu 157, Ca 10.7 (corrected 11.6), Trop 0.04, BNP 1779.   CXR shows worsening R>L opacities.  (2023 09:14)  CHEST CT reviewed    Overnight events: On 3L NC.    PAST MEDICAL & SURGICAL HISTORY:  Hypertension  Diabetes mellitus  Chronic kidney disease, unspecified CKD stage  COPD  DVT      SOCIAL HX:   Current smoker    FAMILY HISTORY:  None    REVIEW OF SYSTEMS  As per HPI    Allergies  penicillin (Unknown)    Intolerances      albuterol/ipratropium for Nebulization 3 milliLiter(s) Nebulizer every 6 hours  aluminum hydroxide/magnesium hydroxide/simethicone Suspension 30 milliLiter(s) Oral every 4 hours PRN  amLODIPine   Tablet 2.5 milliGRAM(s) Oral daily  atorvastatin 20 milliGRAM(s) Oral at bedtime  budesonide  80 MICROgram(s)/formoterol 4.5 MICROgram(s) Inhaler 2 Puff(s) Inhalation two times a day  dextrose 5%. 1000 milliLiter(s) IV Continuous <Continuous>  dextrose 5%. 1000 milliLiter(s) IV Continuous <Continuous>  dextrose 50% Injectable 25 Gram(s) IV Push once  dextrose 50% Injectable 12.5 Gram(s) IV Push once  dextrose 50% Injectable 25 Gram(s) IV Push once  dextrose Oral Gel 15 Gram(s) Oral once PRN  glucagon  Injectable 1 milliGRAM(s) IntraMuscular once  heparin   Injectable 5000 Unit(s) SubCutaneous every 12 hours  insulin glargine Injectable (LANTUS) 10 Unit(s) SubCutaneous every morning  insulin lispro (ADMELOG) corrective regimen sliding scale   SubCutaneous three times a day before meals  insulin lispro Injectable (ADMELOG) 3 Unit(s) SubCutaneous three times a day before meals  lisinopril 10 milliGRAM(s) Oral daily  magnesium sulfate  IVPB 2 Gram(s) IV Intermittent every 2 hours  methylPREDNISolone sodium succinate Injectable 40 milliGRAM(s) IV Push daily  pantoprazole    Tablet 40 milliGRAM(s) Oral two times a day  sodium chloride 0.9%. 1000 milliLiter(s) IV Continuous <Continuous>  sucralfate 1 Gram(s) Oral two times a day  : Home Meds:      PHYSICAL EXAM  ICU Vital Signs Last 24 Hrs  T(C): 36.9 (2023 08:13), Max: 37.2 (2023 00:38)  T(F): 98.4 (2023 08:13), Max: 98.9 (2023 00:38)  HR: 91 (2023 08:13) (89 - 104)  BP: 164/75 (2023 08:13) (107/59 - 164/75)  BP(mean): 108 (2023 08:13) (108 - 108)  RR: 20 (2023 08:13) (18 - 20)  SpO2: 98% (2023 08:13) (98% - 100%)    O2 Parameters below as of 2023 08:13  Patient On (Oxygen Delivery Method): nasal cannula    General: ill appearing  HEENT:  TREVOR              Lungs: decreased BS bilaterally, bilateral crackles  Cardiovascular: Regular  Abdomen: Soft, Positive BS  Extremities: No clubbing  Skin: Warm  Neurological: Non focal       23 @ 07:01  -  23 @ 07:00  --------------------------------------------------------  IN:    sodium chloride 0.9%: 500 mL  Total IN: 500 mL    OUT:  Total OUT: 0 mL    Total NET: 500 mL      LABS:                          8.0    9.83  )-----------( 406      ( 2023 05:36 )             24.8     132<L>  |  96<L>  |  35<H>  ----------------------------<  105<H>  4.7   |  29  |  1.5    Ca    9.6      2023 05:36  Mg     1.4     04-04    TPro  6.2  /  Alb  2.6<L>  /  TBili  <0.2  /  DBili  x   /  AST  11  /  ALT  8   /  AlkPhos  105  04-04    Urinalysis Basic - ( 2023 05:15 )  Color: Light Yellow / Appearance: Clear / S.013 / pH: x  Gluc: x / Ketone: Negative  / Bili: Negative / Urobili: <2 mg/dL   Blood: x / Protein: 30 mg/dL / Nitrite: Negative   Leuk Esterase: Small / RBC: 1 /HPF / WBC 41 /HPF   Sq Epi: x / Non Sq Epi: 14 /HPF / Bacteria: Negative    CARDIAC MARKERS ( 2023 16:44 )  x     / 0.03 ng/mL / x     / x     / x      CARDIAC MARKERS ( 2023 11:42 )  x     / 0.03 ng/mL / x     / x     / x      CARDIAC MARKERS ( 2023 04:20 )  x     / 0.04 ng/mL / x     / x     / x         LIVER FUNCTIONS - ( 2023 05:36 )  Alb: 2.6 g/dL / Pro: 6.2 g/dL / ALK PHOS: 105 U/L / ALT: 8 U/L / AST: 11 U/L / GGT: x                                              Culture - Urine (collected 2023 05:15)  Source: Clean Catch Clean Catch (Midstream)  Final Report (2023 08:12):    >=3 organisms. Probable collection contamination.    X-Rays                                                                                     ECHO    MEDICATIONS  (STANDING):  albuterol/ipratropium for Nebulization 3 milliLiter(s) Nebulizer every 6 hours  amLODIPine   Tablet 2.5 milliGRAM(s) Oral daily  atorvastatin 20 milliGRAM(s) Oral at bedtime  budesonide  80 MICROgram(s)/formoterol 4.5 MICROgram(s) Inhaler 2 Puff(s) Inhalation two times a day  dextrose 5%. 1000 milliLiter(s) (50 mL/Hr) IV Continuous <Continuous>  dextrose 5%. 1000 milliLiter(s) (100 mL/Hr) IV Continuous <Continuous>  dextrose 50% Injectable 25 Gram(s) IV Push once  dextrose 50% Injectable 12.5 Gram(s) IV Push once  dextrose 50% Injectable 25 Gram(s) IV Push once  glucagon  Injectable 1 milliGRAM(s) IntraMuscular once  heparin   Injectable 5000 Unit(s) SubCutaneous every 12 hours  insulin glargine Injectable (LANTUS) 10 Unit(s) SubCutaneous every morning  insulin lispro (ADMELOG) corrective regimen sliding scale   SubCutaneous three times a day before meals  insulin lispro Injectable (ADMELOG) 3 Unit(s) SubCutaneous three times a day before meals  lisinopril 10 milliGRAM(s) Oral daily  magnesium sulfate  IVPB 2 Gram(s) IV Intermittent every 2 hours  methylPREDNISolone sodium succinate Injectable 40 milliGRAM(s) IV Push daily  pantoprazole    Tablet 40 milliGRAM(s) Oral two times a day  sodium chloride 0.9%. 1000 milliLiter(s) (50 mL/Hr) IV Continuous <Continuous>  sucralfate 1 Gram(s) Oral two times a day    MEDICATIONS  (PRN):  aluminum hydroxide/magnesium hydroxide/simethicone Suspension 30 milliLiter(s) Oral every 4 hours PRN Dyspepsia  dextrose Oral Gel 15 Gram(s) Oral once PRN Blood Glucose LESS THAN 70 milliGRAM(s)/deciliter

## 2023-04-04 NOTE — CONSULT NOTE ADULT - ASSESSMENT
IMPRESSION:    Sepsis present on admission  Bilateral multilobar PNA  COPD, not in acute exacerbation  Current smoker  Recent distal left gastrocnemius DVT  Hypomagnesemia  HO CKD      PLAN:    CNS: No depressants    HEENT: Oral care    PULMONARY: HOB @ 45 degrees.  Aspiration precautions.  Incentive spirometer.  Target POx > 92%.  Wean O2 as tolerated.  Baseline ABG/VBG.  Prednisone taper.  c/w home inhalers.  Decrease Duoneb to q8h.   CT chest reviewed no PE.    CARDIOVASCULAR:  Target MAP > 65.  Keep I = O.  ECHO.  DC IVF.      GASTROENTEROLOGY: GI prophylaxis.  Feeding per Speech and swallow.  Bowel regimen if needed.    RENAL:  Replete Mg.  FU lytes.  Correct as necessary.  Monitor UO.    INFECTIOUS:   Vancomycin, Cefepime & Levofloxacin.  MRSA nares, DC Vanc if negative.   Full RVP panel.   Panculture.  FU cultures & sensitivities.  MRSA nares.    ENDOCRINOLOGY:  FU FS.  Insulin protocol if needed.  TSH.    MUSCULOSKELETAL: bed rest for now    CODE STATUS: DNR DNI    DISPO: Monitor in SDU IMPRESSION:    Sepsis present on admission  Bilateral multilobar PNA/ Possible aspiration  COPD, not in acute exacerbation  Current smoker  Recent distal left gastrocnemius DVT  Hypomagnesemia  HO CKD      PLAN:    CNS: No depressants    HEENT: Oral care    PULMONARY: HOB @ 45 degrees.  Aspiration precautions.  Incentive spirometer.  Target POx 88 to 92%.  Wean O2 as tolerated.  Baseline ABG/VBG.  Prednisone taper.  c/w home inhalers.  Decrease Duoneb to q8h.   CT chest reviewed no PE.    CARDIOVASCULAR:  Target MAP > 65.  Keep I = O.  ECHO.  DC IVF.      GASTROENTEROLOGY: GI prophylaxis.  Feeding per Speech and swallow.  Bowel regimen if needed.    RENAL:  Replete Mg.  FU lytes.  Correct as necessary.  Monitor UO.    INFECTIOUS:   Vancomycin, Cefepime & Levofloxacin.  MRSA nares, DC Vanc if negative.   Full RVP panel.   Panculture.  FU cultures & sensitivities.  MRSA nares.    ENDOCRINOLOGY:  FU FS.  Insulin protocol if needed.  TSH.    MUSCULOSKELETAL: AAT, repeat LE doppler    CODE STATUS: DNR DNI    DISPO: Monitor in SDU

## 2023-04-04 NOTE — PROGRESS NOTE ADULT - SUBJECTIVE AND OBJECTIVE BOX
Patient is a 89y old  Female who presents with a chief complaint of SOB (2023 08:44)      INTERVAL HPI/OVERNIGHT EVENTS:   No overnight events   Afebrile, hemodynamically stable     ICU Vital Signs Last 24 Hrs  T(C): 36.6 (2023 11:49), Max: 37.2 (2023 00:38)  T(F): 97.9 (2023 11:49), Max: 98.9 (2023 00:38)  HR: 102 (2023 11:49) (89 - 104)  BP: 153/80 (2023 11:49) (107/59 - 164/75)  BP(mean): 110 (2023 11:49) (108 - 110)  ABP: --  ABP(mean): --  RR: 20 (2023 11:49) (18 - 20)  SpO2: 95% (2023 13:31) (95% - 100%)    O2 Parameters below as of 2023 13:31  Patient On (Oxygen Delivery Method): room air          I&O's Summary    2023 07:01  -  2023 07:00  --------------------------------------------------------  IN: 500 mL / OUT: 0 mL / NET: 500 mL    2023 07:01  -  2023 14:22  --------------------------------------------------------  IN: 150 mL / OUT: 300 mL / NET: -150 mL          LABS:                        8.0    9.83  )-----------( 406      ( 2023 05:36 )             24.8         132<L>  |  96<L>  |  35<H>  ----------------------------<  105<H>  4.7   |  29  |  1.5    Ca    9.6      2023 05:36  Mg     1.4     -    TPro  6.2  /  Alb  2.6<L>  /  TBili  <0.2  /  DBili  x   /  AST  11  /  ALT  8   /  AlkPhos  105  -      Urinalysis Basic - ( 2023 05:15 )    Color: Light Yellow / Appearance: Clear / S.013 / pH: x  Gluc: x / Ketone: Negative  / Bili: Negative / Urobili: <2 mg/dL   Blood: x / Protein: 30 mg/dL / Nitrite: Negative   Leuk Esterase: Small / RBC: 1 /HPF / WBC 41 /HPF   Sq Epi: x / Non Sq Epi: 14 /HPF / Bacteria: Negative      CAPILLARY BLOOD GLUCOSE      POCT Blood Glucose.: 182 mg/dL (2023 11:26)  POCT Blood Glucose.: 114 mg/dL (2023 07:37)  POCT Blood Glucose.: 177 mg/dL (2023 22:04)  POCT Blood Glucose.: 205 mg/dL (2023 17:34)        RADIOLOGY & ADDITIONAL TESTS:    Consultant(s) Notes Reviewed:  [x ] YES  [ ] NO    MEDICATIONS  (STANDING):  albuterol/ipratropium for Nebulization 3 milliLiter(s) Nebulizer every 6 hours  amLODIPine   Tablet 2.5 milliGRAM(s) Oral daily  atorvastatin 20 milliGRAM(s) Oral at bedtime  budesonide  80 MICROgram(s)/formoterol 4.5 MICROgram(s) Inhaler 2 Puff(s) Inhalation two times a day  cefepime   IVPB      chlorhexidine 2% Cloths 1 Application(s) Topical every 12 hours  dextrose 5%. 1000 milliLiter(s) (50 mL/Hr) IV Continuous <Continuous>  dextrose 5%. 1000 milliLiter(s) (100 mL/Hr) IV Continuous <Continuous>  dextrose 50% Injectable 25 Gram(s) IV Push once  dextrose 50% Injectable 12.5 Gram(s) IV Push once  dextrose 50% Injectable 25 Gram(s) IV Push once  enoxaparin Injectable 50 milliGRAM(s) SubCutaneous every 24 hours  glucagon  Injectable 1 milliGRAM(s) IntraMuscular once  insulin glargine Injectable (LANTUS) 10 Unit(s) SubCutaneous every morning  insulin lispro (ADMELOG) corrective regimen sliding scale   SubCutaneous three times a day before meals  insulin lispro Injectable (ADMELOG) 3 Unit(s) SubCutaneous three times a day before meals  levoFLOXacin IVPB 500 milliGRAM(s) IV Intermittent every 24 hours  lisinopril 10 milliGRAM(s) Oral daily  pantoprazole    Tablet 40 milliGRAM(s) Oral two times a day  predniSONE   Tablet 40 milliGRAM(s) Oral daily  sucralfate 1 Gram(s) Oral two times a day  vancomycin  IVPB 750 milliGRAM(s) IV Intermittent every 12 hours    MEDICATIONS  (PRN):  aluminum hydroxide/magnesium hydroxide/simethicone Suspension 30 milliLiter(s) Oral every 4 hours PRN Dyspepsia  dextrose Oral Gel 15 Gram(s) Oral once PRN Blood Glucose LESS THAN 70 milliGRAM(s)/deciliter      PHYSICAL EXAM:  GENERAL: NAD  NERVOUS SYSTEM:  Alert & Awake.   CHEST/LUNG: Decreased breath sounds  HEART: S1S2 normal, no S3, Regular rate and rhythm; No murmurs  ABDOMEN: Soft, Nontender, Nondistended; Bowel sounds present  EXTREMITIES:  2+ Peripheral Pulses, No clubbing, cyanosis, or edema  LYMPH: No lymphadenopathy noted  SKIN: No rashes or lesions    Care Discussed with Consultants/Other Providers [ x] YES  [ ] NO

## 2023-04-04 NOTE — PROGRESS NOTE ADULT - ASSESSMENT
89 year old female withPMHx of HTN, DM, CKD, active smoker, COPD not on home O2 recent admission for aspiration PNA with incidental finding of distal DVT presented to the ED for hypoxia    #Acute Hypoxic Respiratory Failure likely secondary to aspiration PNA vs COPD exacerbation  #Active Smoker   #COPD/emphysema not on home O2  #Recent Distal LLE DVT   - 1 day of SOB, productive cough  - CXR --> bilateral interstitial opacifications   - currently on 4L NC, saturating 100%  - WBC 8, Lactate 1.6, VBG pCO2: 47  - Trop 0.04, BNP 1779  - s/p Azithro, Cefepime, flagyl in ED  - RVP negative strep, legionella, MRSA   - hx of distal DVT, now hypoxic and tachycardic   >> duplex-> dvt in left gastrocnemius   >> ct angio- neg for PE   - as per last pulm eval: has significant emphysema on CT and likely significant COPD ; unlikely to be able to perform PFTs   - s/p solumedrol 125mg in ED, continue with solumedrol 40mg daily   - wean O2 to 89-92%  - as per family, this is patient's 3rd admission for aspiration PNA in past 4 months   - procal- 0.14  - c/w vanco, cefepime, levaquin     #LLE distal DVT   - Duplex (3/24/23) noted thrombus in left gastrocnemius and peroneal veins  - evaluated by Vascular; no need for AC, repeat LE duplex in 1 week  - f/u LE duplex     #Hypercalcemia  - Ca 10.7 (corrected 11.6)  - hold HCTZ  - gentle hydration for 24hrs     #Dyspepsia  #Peptic Ulcer Disease  - continue with protonix BID, and Carafate   - endoscopy was not performed before due to age and comorbidities   - GI f/u OP on Wednesday    #CKD   - Cr  1.4 on admission   - was following with Nephrology in [place]    #Elevated Troponins  - Trops 0.04 (previously 0.04 > 0.03)  - no chest pain    #Normocytic Anemia  - Hg 10.4 (unknown baseline)  - keep active T&S, transfuse if Hg<7    #HTN  - was on amlodipine 2.5 mg daily, HCTZ 12.5mg daily and lisinopril 10mg daily in assisted living   - hold HCTZ for hypercalcemia   - continue with lisinopril and amlodipine     #DM  - takes pioglitazone 15mg daily  - c/w insulin lantus 9U at bedtime, lispro 3U TID ; monitor FS  - A1c 7.4% (3/2023)    #DVT PPx: lovenox  #GI PPx: PPI  #Diet: Soft and Bite Sized   DNR/DNI

## 2023-04-04 NOTE — PROGRESS NOTE ADULT - ATTENDING COMMENTS
patient seen and examined independently on morning rounds for the first time today in the SDU, chart reviewed and discussed with the medicine resident and on interdisciplinary rounds and agree with the above resident progress note with the following addendum:    hospital day #1--patient admitted overnight    PE:  GEN-NAD, AAOx3  PULM- decreased air entry bilateral bases  CVS- +s1/s2 tachycardic  GI- soft NT ND +bs, no rebound, no guarding  EXT- no edema    labs/radiology reviewed    a/p:  #Acute hypoxic respiratoary failure 2/2 bilateral multifocal pneumonia  #sepsis poa  #copd- acute on chronic- active tobacco smoker  #+h/o prior distal DVT  -continue monitoring in SDU  -pulm/critical care following  -f/u rvp, MRSA, LA, Echo  -continue iv steroids--solumedrol with taper  -cont nebs  -smoking cessation counseling  -cont iv abx (vanco/cefepime/levaquin)--if mrsa neg dc vanco  -check legionella and strep  -monitor wbc and fever curve  -procal 0.15, LA 1.5,   -start AC (lovenox bid)  -suppl electrolytes (hypomagnesemia---mg iv and repeat level in am)  -f/u ECHO  -duplex L gastrocnemius +DVT    DVT/GI ppx  overall poor prognosis    DNR/DNI    Total time spent to complete patient's bedside assessment, review medical chart, discuss medical plan of care with covering medical team was more than 50 minutes  with >50% of time spendt face to face with patient, discussion with patient/family and/or coordination of care WDL

## 2023-04-04 NOTE — PHARMACOTHERAPY INTERVENTION NOTE - COMMENTS
Modified penicillin allergy to state patient tolerated cefepime during this admission.    Inderjit Blake, PharmD  Clinical Pharmacy Specialist, Infectious Diseases  Tele-Antimicrobial Stewardship Program (Tele-ASP)  Tele-ASP Phone: [phone]

## 2023-04-05 LAB
ALBUMIN SERPL ELPH-MCNC: 2.7 G/DL — LOW (ref 3.5–5.2)
ALP SERPL-CCNC: 114 U/L — SIGNIFICANT CHANGE UP (ref 30–115)
ALT FLD-CCNC: 10 U/L — SIGNIFICANT CHANGE UP (ref 0–41)
ANION GAP SERPL CALC-SCNC: 10 MMOL/L — SIGNIFICANT CHANGE UP (ref 7–14)
AST SERPL-CCNC: 16 U/L — SIGNIFICANT CHANGE UP (ref 0–41)
BASE EXCESS BLDA CALC-SCNC: 4.7 MMOL/L — HIGH (ref -2–3)
BASOPHILS # BLD AUTO: 0.06 K/UL — SIGNIFICANT CHANGE UP (ref 0–0.2)
BASOPHILS NFR BLD AUTO: 0.6 % — SIGNIFICANT CHANGE UP (ref 0–1)
BILIRUB SERPL-MCNC: <0.2 MG/DL — SIGNIFICANT CHANGE UP (ref 0.2–1.2)
BUN SERPL-MCNC: 46 MG/DL — HIGH (ref 10–20)
CALCIUM SERPL-MCNC: 9.8 MG/DL — SIGNIFICANT CHANGE UP (ref 8.4–10.5)
CHLORIDE SERPL-SCNC: 93 MMOL/L — LOW (ref 98–110)
CO2 SERPL-SCNC: 27 MMOL/L — SIGNIFICANT CHANGE UP (ref 17–32)
CREAT SERPL-MCNC: 1.4 MG/DL — SIGNIFICANT CHANGE UP (ref 0.7–1.5)
EGFR: 36 ML/MIN/1.73M2 — LOW
EOSINOPHIL # BLD AUTO: 0.01 K/UL — SIGNIFICANT CHANGE UP (ref 0–0.7)
EOSINOPHIL NFR BLD AUTO: 0.1 % — SIGNIFICANT CHANGE UP (ref 0–8)
GLUCOSE BLDC GLUCOMTR-MCNC: 132 MG/DL — HIGH (ref 70–99)
GLUCOSE BLDC GLUCOMTR-MCNC: 138 MG/DL — HIGH (ref 70–99)
GLUCOSE BLDC GLUCOMTR-MCNC: 261 MG/DL — HIGH (ref 70–99)
GLUCOSE BLDC GLUCOMTR-MCNC: 276 MG/DL — HIGH (ref 70–99)
GLUCOSE SERPL-MCNC: 85 MG/DL — SIGNIFICANT CHANGE UP (ref 70–99)
HCO3 BLDA-SCNC: 29 MMOL/L — HIGH (ref 21–28)
HCT VFR BLD CALC: 27.9 % — LOW (ref 37–47)
HGB BLD-MCNC: 8.8 G/DL — LOW (ref 12–16)
IMM GRANULOCYTES NFR BLD AUTO: 1.4 % — HIGH (ref 0.1–0.3)
LEGIONELLA AG UR QL: NEGATIVE — SIGNIFICANT CHANGE UP
LYMPHOCYTES # BLD AUTO: 2.21 K/UL — SIGNIFICANT CHANGE UP (ref 1.2–3.4)
LYMPHOCYTES # BLD AUTO: 21.2 % — SIGNIFICANT CHANGE UP (ref 20.5–51.1)
MAGNESIUM SERPL-MCNC: 2.4 MG/DL — SIGNIFICANT CHANGE UP (ref 1.8–2.4)
MCHC RBC-ENTMCNC: 28.2 PG — SIGNIFICANT CHANGE UP (ref 27–31)
MCHC RBC-ENTMCNC: 31.5 G/DL — LOW (ref 32–37)
MCV RBC AUTO: 89.4 FL — SIGNIFICANT CHANGE UP (ref 81–99)
MONOCYTES # BLD AUTO: 0.75 K/UL — HIGH (ref 0.1–0.6)
MONOCYTES NFR BLD AUTO: 7.2 % — SIGNIFICANT CHANGE UP (ref 1.7–9.3)
NEUTROPHILS # BLD AUTO: 7.26 K/UL — HIGH (ref 1.4–6.5)
NEUTROPHILS NFR BLD AUTO: 69.5 % — SIGNIFICANT CHANGE UP (ref 42.2–75.2)
NRBC # BLD: 0 /100 WBCS — SIGNIFICANT CHANGE UP (ref 0–0)
PCO2 BLDA: 42 MMHG — SIGNIFICANT CHANGE UP (ref 25–48)
PH BLDA: 7.45 — SIGNIFICANT CHANGE UP (ref 7.35–7.45)
PLATELET # BLD AUTO: 508 K/UL — HIGH (ref 130–400)
PO2 BLDA: 87 MMHG — SIGNIFICANT CHANGE UP (ref 83–108)
POTASSIUM SERPL-MCNC: 4.5 MMOL/L — SIGNIFICANT CHANGE UP (ref 3.5–5)
POTASSIUM SERPL-SCNC: 4.5 MMOL/L — SIGNIFICANT CHANGE UP (ref 3.5–5)
PROT SERPL-MCNC: 6.5 G/DL — SIGNIFICANT CHANGE UP (ref 6–8)
RAPID RVP RESULT: SIGNIFICANT CHANGE UP
RBC # BLD: 3.12 M/UL — LOW (ref 4.2–5.4)
RBC # FLD: 16.3 % — HIGH (ref 11.5–14.5)
SAO2 % BLDA: 98.7 % — HIGH (ref 94–98)
SARS-COV-2 RNA SPEC QL NAA+PROBE: SIGNIFICANT CHANGE UP
SODIUM SERPL-SCNC: 130 MMOL/L — LOW (ref 135–146)
TSH SERPL-MCNC: 1.1 UIU/ML — SIGNIFICANT CHANGE UP (ref 0.27–4.2)
WBC # BLD: 10.44 K/UL — SIGNIFICANT CHANGE UP (ref 4.8–10.8)
WBC # FLD AUTO: 10.44 K/UL — SIGNIFICANT CHANGE UP (ref 4.8–10.8)

## 2023-04-05 PROCEDURE — 99233 SBSQ HOSP IP/OBS HIGH 50: CPT

## 2023-04-05 RX ORDER — APIXABAN 2.5 MG/1
5 TABLET, FILM COATED ORAL EVERY 12 HOURS
Refills: 0 | Status: DISCONTINUED | OUTPATIENT
Start: 2023-04-05 | End: 2023-04-09

## 2023-04-05 RX ORDER — LANOLIN ALCOHOL/MO/W.PET/CERES
5 CREAM (GRAM) TOPICAL AT BEDTIME
Refills: 0 | Status: DISCONTINUED | OUTPATIENT
Start: 2023-04-05 | End: 2023-04-19

## 2023-04-05 RX ADMIN — Medication 1 GRAM(S): at 05:48

## 2023-04-05 RX ADMIN — PANTOPRAZOLE SODIUM 40 MILLIGRAM(S): 20 TABLET, DELAYED RELEASE ORAL at 18:46

## 2023-04-05 RX ADMIN — CHLORHEXIDINE GLUCONATE 1 APPLICATION(S): 213 SOLUTION TOPICAL at 05:47

## 2023-04-05 RX ADMIN — Medication 3 UNIT(S): at 08:50

## 2023-04-05 RX ADMIN — Medication 250 MILLIGRAM(S): at 18:47

## 2023-04-05 RX ADMIN — Medication 20 MILLIGRAM(S): at 15:26

## 2023-04-05 RX ADMIN — CEFEPIME 100 MILLIGRAM(S): 1 INJECTION, POWDER, FOR SOLUTION INTRAMUSCULAR; INTRAVENOUS at 15:25

## 2023-04-05 RX ADMIN — PANTOPRAZOLE SODIUM 40 MILLIGRAM(S): 20 TABLET, DELAYED RELEASE ORAL at 05:49

## 2023-04-05 RX ADMIN — Medication 3: at 17:01

## 2023-04-05 RX ADMIN — Medication 1 GRAM(S): at 18:46

## 2023-04-05 RX ADMIN — AMLODIPINE BESYLATE 2.5 MILLIGRAM(S): 2.5 TABLET ORAL at 05:47

## 2023-04-05 RX ADMIN — Medication 3 UNIT(S): at 18:49

## 2023-04-05 RX ADMIN — Medication 3 MILLILITER(S): at 20:15

## 2023-04-05 RX ADMIN — APIXABAN 5 MILLIGRAM(S): 2.5 TABLET, FILM COATED ORAL at 18:46

## 2023-04-05 RX ADMIN — Medication 5 MILLIGRAM(S): at 21:30

## 2023-04-05 RX ADMIN — ATORVASTATIN CALCIUM 20 MILLIGRAM(S): 80 TABLET, FILM COATED ORAL at 22:30

## 2023-04-05 RX ADMIN — Medication 250 MILLIGRAM(S): at 05:50

## 2023-04-05 RX ADMIN — LISINOPRIL 10 MILLIGRAM(S): 2.5 TABLET ORAL at 05:48

## 2023-04-05 RX ADMIN — Medication 3 MILLILITER(S): at 07:53

## 2023-04-05 RX ADMIN — INSULIN GLARGINE 10 UNIT(S): 100 INJECTION, SOLUTION SUBCUTANEOUS at 08:54

## 2023-04-05 RX ADMIN — Medication 40 MILLIGRAM(S): at 05:49

## 2023-04-05 NOTE — PROGRESS NOTE ADULT - SUBJECTIVE AND OBJECTIVE BOX
RAUL LYNN  89y Female    CHIEF COMPLAINT:    Patient is a 89y old  Female who presents with a chief complaint of SOB (2023 11:32)    INTERVAL HPI/OVERNIGHT EVENTS:    Patient seen and examined. No acute events overnight. on NC,, increased WOB    ROS: All other systems are negative.    Vital Signs:    T(F): 96.7 (23 @ 11:45), Max: 97.2 (23 @ 00:00)  HR: 105 (23 @ 11:45) (93 - 112)  BP: 134/62 (23 @ 11:45) (133/70 - 173/80)  RR: 20 (23 @ 11:45) (20 - 20)  SpO2: 98% (23 @ 11:45) (95% - 100%)    2023 07:01  -  2023 07:00  --------------------------------------------------------  IN: 550 mL / OUT: 1250 mL / NET: -700 mL    Daily Weight in k.6 (2023 16:00)    POCT Blood Glucose.: 138 mg/dL (2023 11:32)  POCT Blood Glucose.: 132 mg/dL (2023 06:52)  POCT Blood Glucose.: 240 mg/dL (2023 15:47)    PHYSICAL EXAM:    GENERAL:  NAD chronically ill  appearing   SKIN: No rashes or lesions  HEENT: Atraumatic. Normocephalic.   NECK: Supple, No JVD.    PULMONARY: decreased breath sounds B/L. No wheezing.    CVS: Normal S1, S2. Rate and Rhythm are regular.  ABDOMEN/GI: Soft, Nontender, Nondistended; BS present  MSK:  No clubbing or cyanosis   NEUROLOGIC:  Moves all extremities   PSYCH: Awake and alert     Consultant(s) Notes Reviewed:  [x ] YES  [ ] NO  Care Discussed with Consultants/Other Providers [ x] YES  [ ] NO    LABS:                        8.8    10.44 )-----------( 508      ( 2023 01:28 )             27.9     130<L>  |  93<L>  |  46<H>  ----------------------------<  85  4.5   |  27  |  1.4    Ca    9.8      2023 01:28  Mg     2.4     04-05    TPro  6.5  /  Alb  2.7<L>  /  TBili  <0.2  /  DBili  x   /  AST  16  /  ALT  10  /  AlkPhos  114  04-05    Trop 0.03, CKMB --, CK --, 23 @ 16:44  Trop 0.03, CKMB --, CK --, 23 @ 11:42  Trop 0.04, CKMB --, CK --, 23 @ 04:20    Culture - Blood (collected 2023 05:55)  Source: .Blood Blood-Venous  Preliminary Report (2023 13:02):    No growth to date.    Culture - Blood (collected 2023 05:55)  Source: .Blood Blood-Venous  Preliminary Report (2023 13:02):    No growth to date.    Culture - Urine (collected 2023 05:15)  Source: Clean Catch Clean Catch (Midstream)  Final Report (2023 08:12):    >=3 organisms. Probable collection contamination.    RADIOLOGY & ADDITIONAL TESTS:  Imaging or report Personally Reviewed:  [x] YES  [ ] NO  EKG reviewed: [x] YES  [ ] NO    Medications:  Standing  albuterol/ipratropium for Nebulization 3 milliLiter(s) Nebulizer every 6 hours  amLODIPine   Tablet 2.5 milliGRAM(s) Oral daily  apixaban 5 milliGRAM(s) Oral every 12 hours  atorvastatin 20 milliGRAM(s) Oral at bedtime  budesonide  80 MICROgram(s)/formoterol 4.5 MICROgram(s) Inhaler 2 Puff(s) Inhalation two times a day  cefepime   IVPB      cefepime   IVPB 1000 milliGRAM(s) IV Intermittent every 24 hours  dextrose 5%. 1000 milliLiter(s) IV Continuous <Continuous>  dextrose 5%. 1000 milliLiter(s) IV Continuous <Continuous>  dextrose 50% Injectable 25 Gram(s) IV Push once  dextrose 50% Injectable 12.5 Gram(s) IV Push once  dextrose 50% Injectable 25 Gram(s) IV Push once  glucagon  Injectable 1 milliGRAM(s) IntraMuscular once  insulin glargine Injectable (LANTUS) 10 Unit(s) SubCutaneous every morning  insulin lispro (ADMELOG) corrective regimen sliding scale   SubCutaneous three times a day before meals  insulin lispro Injectable (ADMELOG) 3 Unit(s) SubCutaneous three times a day before meals  levoFLOXacin IVPB 500 milliGRAM(s) IV Intermittent every 24 hours  lisinopril 10 milliGRAM(s) Oral daily  pantoprazole    Tablet 40 milliGRAM(s) Oral two times a day  predniSONE   Tablet 20 milliGRAM(s) Oral daily  sucralfate 1 Gram(s) Oral two times a day  vancomycin  IVPB 750 milliGRAM(s) IV Intermittent every 12 hours    PRN Meds  acetaminophen     Tablet .. 650 milliGRAM(s) Oral every 6 hours PRN  aluminum hydroxide/magnesium hydroxide/simethicone Suspension 30 milliLiter(s) Oral every 4 hours PRN  dextrose Oral Gel 15 Gram(s) Oral once PRN

## 2023-04-05 NOTE — PROGRESS NOTE ADULT - ASSESSMENT
IMPRESSION:    Sepsis present on admission  Bilateral multilobar PNA/ Possible aspiration  COPD, not in acute exacerbation  Current smoker  Recent distal left gastrocnemius DVT  Hypomagnesemia  HO CKD      PLAN:    CNS: No depressants    HEENT: Oral care    PULMONARY: HOB @ 45 degrees.  Aspiration precautions.  Incentive spirometer.  Target POx 88 to 92%.  Wean O2 as tolerated.  Baseline ABG/VBG.  Prednisone taper.  c/w home inhalers.  Duoneb to q8h.     CARDIOVASCULAR:  Target MAP > 65.  Keep I = O.  ECHO.    GASTROENTEROLOGY: GI prophylaxis.  Feeding as tolerated.  Bowel regimen if needed.    RENAL:  FU lytes.  Correct as necessary.  Monitor UO.    INFECTIOUS:   DC Vancomycin.  Cefepime & Levofloxacin.   Full RVP panel.   Panculture.  FU cultures & sensitivities.    ENDOCRINOLOGY:  FU FS.  Insulin protocol if needed.  FU TSH.    HEMATOLOGY:  Eliquis.  LE-VDUS reviewed unchanged from prior.    MUSCULOSKELETAL: OOB to chair, PT/OT    CODE STATUS: DNR DNI    DISPO: Monitor in SDU

## 2023-04-05 NOTE — PROGRESS NOTE ADULT - ASSESSMENT
89 year old female withPMHx of HTN, DM, CKD, active smoker, COPD not on home O2 recent admission for aspiration PNA with incidental finding of distal DVT presented to the ED for hypoxia    Acute Hypoxic respiratory failure Likely secondary to Aspiration PNA/Bilateral with sepsis POA  COPD not on home 02  Active smoker   Recent Distal DVT  currently on 3L NC, improving  RVP negative strep, legionella, MRSA, procal 0.14   duplex-> dvt in left gastrocnemius   ct angio- neg for PE   On cefepime and LEvaquin, f/u pending cultures  continue with prednisone taper  f/u echo  diet per speech and swallow   - PT  nicotine patch     LLE distal DVT   - Duplex (3/24/23) noted thrombus in left gastrocnemius and peroneal veins  - repeat duplex unchanged  - start Eliquis     hyponatremia  130 today, ecourage PO hydration  HCTZ on hold    Dyspepsia  Peptic Ulcer Disease  - continue with protonix BID, and Carafate     CKD III  - Cr  1.4 on admission, stable   - was following with Nephrology in [place]    HTN  - was on amlodipine 2.5 mg daily, HCTZ 12.5mg daily and lisinopril 10mg daily in assisted living   - holding HCTZ     DM  - takes pioglitazone 15mg daily  - c/w insulin lantus 10U at bedtime, lispro 3U TID ; monitor FS  - A1c 7.4% (3/2023)    DNR/DNI    Pending: Echo, labs, IV abx, pt

## 2023-04-05 NOTE — PROGRESS NOTE ADULT - SUBJECTIVE AND OBJECTIVE BOX
Patient is a 89y old  Female who presents with a chief complaint of SOB (05 Apr 2023 08:50)      INTERVAL HPI/OVERNIGHT EVENTS:   No overnight events   Afebrile, hemodynamically stable     This AM, on 3L NC     ICU Vital Signs Last 24 Hrs  T(C): 35.8 (05 Apr 2023 08:05), Max: 36.6 (04 Apr 2023 11:49)  T(F): 96.4 (05 Apr 2023 08:05), Max: 97.9 (04 Apr 2023 11:49)  HR: 96 (05 Apr 2023 08:05) (93 - 112)  BP: 172/80 (05 Apr 2023 08:05) (133/70 - 173/80)  BP(mean): 115 (05 Apr 2023 08:05) (95 - 115)  ABP: --  ABP(mean): --  RR: 20 (05 Apr 2023 08:05) (20 - 20)  SpO2: 98% (05 Apr 2023 08:05) (95% - 100%)    O2 Parameters below as of 05 Apr 2023 08:05  Patient On (Oxygen Delivery Method): nasal cannula          I&O's Summary    04 Apr 2023 07:01  -  05 Apr 2023 07:00  --------------------------------------------------------  IN: 550 mL / OUT: 1250 mL / NET: -700 mL          LABS:                        8.8    10.44 )-----------( 508      ( 05 Apr 2023 01:28 )             27.9     04-05    130<L>  |  93<L>  |  46<H>  ----------------------------<  85  4.5   |  27  |  1.4    Ca    9.8      05 Apr 2023 01:28  Mg     2.4     04-05    TPro  6.5  /  Alb  2.7<L>  /  TBili  <0.2  /  DBili  x   /  AST  16  /  ALT  10  /  AlkPhos  114  04-05        CAPILLARY BLOOD GLUCOSE      POCT Blood Glucose.: 138 mg/dL (05 Apr 2023 11:32)  POCT Blood Glucose.: 132 mg/dL (05 Apr 2023 06:52)  POCT Blood Glucose.: 240 mg/dL (04 Apr 2023 15:47)    ABG - ( 05 Apr 2023 10:43 )  pH, Arterial: 7.45  pH, Blood: x     /  pCO2: 42    /  pO2: 87    / HCO3: 29    / Base Excess: 4.7   /  SaO2: 98.7                RADIOLOGY & ADDITIONAL TESTS:    Consultant(s) Notes Reviewed:  [x ] YES  [ ] NO    MEDICATIONS  (STANDING):  albuterol/ipratropium for Nebulization 3 milliLiter(s) Nebulizer every 6 hours  amLODIPine   Tablet 2.5 milliGRAM(s) Oral daily  apixaban 5 milliGRAM(s) Oral every 12 hours  atorvastatin 20 milliGRAM(s) Oral at bedtime  budesonide  80 MICROgram(s)/formoterol 4.5 MICROgram(s) Inhaler 2 Puff(s) Inhalation two times a day  cefepime   IVPB      cefepime   IVPB 1000 milliGRAM(s) IV Intermittent every 24 hours  dextrose 5%. 1000 milliLiter(s) (50 mL/Hr) IV Continuous <Continuous>  dextrose 5%. 1000 milliLiter(s) (100 mL/Hr) IV Continuous <Continuous>  dextrose 50% Injectable 25 Gram(s) IV Push once  dextrose 50% Injectable 12.5 Gram(s) IV Push once  dextrose 50% Injectable 25 Gram(s) IV Push once  glucagon  Injectable 1 milliGRAM(s) IntraMuscular once  insulin glargine Injectable (LANTUS) 10 Unit(s) SubCutaneous every morning  insulin lispro (ADMELOG) corrective regimen sliding scale   SubCutaneous three times a day before meals  insulin lispro Injectable (ADMELOG) 3 Unit(s) SubCutaneous three times a day before meals  levoFLOXacin IVPB 500 milliGRAM(s) IV Intermittent every 24 hours  lisinopril 10 milliGRAM(s) Oral daily  pantoprazole    Tablet 40 milliGRAM(s) Oral two times a day  predniSONE   Tablet 20 milliGRAM(s) Oral daily  sucralfate 1 Gram(s) Oral two times a day  vancomycin  IVPB 750 milliGRAM(s) IV Intermittent every 12 hours    MEDICATIONS  (PRN):  acetaminophen     Tablet .. 650 milliGRAM(s) Oral every 6 hours PRN Temp greater or equal to 38C (100.4F), Mild Pain (1 - 3)  aluminum hydroxide/magnesium hydroxide/simethicone Suspension 30 milliLiter(s) Oral every 4 hours PRN Dyspepsia  dextrose Oral Gel 15 Gram(s) Oral once PRN Blood Glucose LESS THAN 70 milliGRAM(s)/deciliter      PHYSICAL EXAM:  GENERAL: NAD, 3 L NC   HEAD:  Atraumatic, Normocephalic  EYES: EOMI, PERRLA, conjunctiva and sclera clear  NECK: Supple, No JVD, Normal thyroid, no enlarged nodes  NERVOUS SYSTEM:  Alert & Awake.   CHEST/LUNG: B/L good air entry; No rales, rhonchi, or wheezing  HEART: S1S2 normal, no S3, Regular rate and rhythm; No murmurs  ABDOMEN: Soft, Nontender, Nondistended; Bowel sounds present  EXTREMITIES:  2+ Peripheral Pulses, No clubbing, cyanosis, or edema  LYMPH: No lymphadenopathy noted  SKIN: No rashes or lesions    Care Discussed with Consultants/Other Providers [ x] YES  [ ] NO

## 2023-04-05 NOTE — PROGRESS NOTE ADULT - SUBJECTIVE AND OBJECTIVE BOX
Over Night Events:  ON 2L NC.    PHYSICAL EXAM  ICU Vital Signs Last 24 Hrs  T(C): 35.8 (05 Apr 2023 08:05), Max: 36.6 (04 Apr 2023 11:49)  T(F): 96.4 (05 Apr 2023 08:05), Max: 97.9 (04 Apr 2023 11:49)  HR: 96 (05 Apr 2023 08:05) (93 - 112)  BP: 172/80 (05 Apr 2023 08:05) (133/70 - 173/80)  BP(mean): 115 (05 Apr 2023 08:05) (95 - 115)  RR: 20 (05 Apr 2023 08:05) (20 - 20)  SpO2: 98% (05 Apr 2023 08:05) (95% - 100%)    O2 Parameters below as of 05 Apr 2023 08:05  Patient On (Oxygen Delivery Method): nasal cannula      General: ill appearing  HEENT:  TREVOR              Lungs: decreased BS bilaterally, bilateral crackles  Cardiovascular: Regular  Abdomen: Soft, Positive BS  Extremities: No clubbing  Skin: Warm  Neurological: Non focal       04-04-23 @ 07:01  -  04-05-23 @ 07:00  --------------------------------------------------------  IN:    Oral Fluid: 550 mL  Total IN: 550 mL    OUT:    Voided (mL): 1250 mL  Total OUT: 1250 mL    Total NET: -700 mL      LABS:                        8.8    10.44 )-----------( 508      ( 05 Apr 2023 01:28 )             27.9     130<L>  |  93<L>  |  46<H>  ----------------------------<  85  4.5   |  27  |  1.4    Ca    9.8      05 Apr 2023 01:28  Mg     2.4     04-05    TPro  6.5  /  Alb  2.7<L>  /  TBili  <0.2  /  DBili  x   /  AST  16  /  ALT  10  /  AlkPhos  114  04-05      CARDIAC MARKERS ( 03 Apr 2023 16:44 )  x     / 0.03 ng/mL / x     / x     / x      CARDIAC MARKERS ( 03 Apr 2023 11:42 )  x     / 0.03 ng/mL / x     / x     / x        LIVER FUNCTIONS - ( 05 Apr 2023 01:28 )  Alb: 2.7 g/dL / Pro: 6.5 g/dL / ALK PHOS: 114 U/L / ALT: 10 U/L / AST: 16 U/L / GGT: x                                                Culture - Blood (collected 03 Apr 2023 05:55)  Source: .Blood Blood-Venous  Preliminary Report (04 Apr 2023 13:02):    No growth to date.    Culture - Blood (collected 03 Apr 2023 05:55)  Source: .Blood Blood-Venous  Preliminary Report (04 Apr 2023 13:02):    No growth to date.    Culture - Urine (collected 03 Apr 2023 05:15)  Source: Clean Catch Clean Catch (Midstream)  Final Report (04 Apr 2023 08:12):    >=3 organisms. Probable collection contamination.                                                                      MEDICATIONS  (STANDING):  albuterol/ipratropium for Nebulization 3 milliLiter(s) Nebulizer every 6 hours  amLODIPine   Tablet 2.5 milliGRAM(s) Oral daily  atorvastatin 20 milliGRAM(s) Oral at bedtime  budesonide  80 MICROgram(s)/formoterol 4.5 MICROgram(s) Inhaler 2 Puff(s) Inhalation two times a day  cefepime   IVPB      cefepime   IVPB 1000 milliGRAM(s) IV Intermittent every 24 hours  dextrose 5%. 1000 milliLiter(s) (50 mL/Hr) IV Continuous <Continuous>  dextrose 5%. 1000 milliLiter(s) (100 mL/Hr) IV Continuous <Continuous>  dextrose 50% Injectable 25 Gram(s) IV Push once  dextrose 50% Injectable 12.5 Gram(s) IV Push once  dextrose 50% Injectable 25 Gram(s) IV Push once  enoxaparin Injectable 50 milliGRAM(s) SubCutaneous every 24 hours  glucagon  Injectable 1 milliGRAM(s) IntraMuscular once  insulin glargine Injectable (LANTUS) 10 Unit(s) SubCutaneous every morning  insulin lispro (ADMELOG) corrective regimen sliding scale   SubCutaneous three times a day before meals  insulin lispro Injectable (ADMELOG) 3 Unit(s) SubCutaneous three times a day before meals  levoFLOXacin IVPB 500 milliGRAM(s) IV Intermittent every 24 hours  lisinopril 10 milliGRAM(s) Oral daily  pantoprazole    Tablet 40 milliGRAM(s) Oral two times a day  predniSONE   Tablet 40 milliGRAM(s) Oral daily  sucralfate 1 Gram(s) Oral two times a day  vancomycin  IVPB 750 milliGRAM(s) IV Intermittent every 12 hours    MEDICATIONS  (PRN):  acetaminophen     Tablet .. 650 milliGRAM(s) Oral every 6 hours PRN Temp greater or equal to 38C (100.4F), Mild Pain (1 - 3)  aluminum hydroxide/magnesium hydroxide/simethicone Suspension 30 milliLiter(s) Oral every 4 hours PRN Dyspepsia  dextrose Oral Gel 15 Gram(s) Oral once PRN Blood Glucose LESS THAN 70 milliGRAM(s)/deciliter     Over Night Events:  ON 2L NC. afebrile    PHYSICAL EXAM  ICU Vital Signs Last 24 Hrs  T(C): 35.8 (05 Apr 2023 08:05), Max: 36.6 (04 Apr 2023 11:49)  T(F): 96.4 (05 Apr 2023 08:05), Max: 97.9 (04 Apr 2023 11:49)  HR: 96 (05 Apr 2023 08:05) (93 - 112)  BP: 172/80 (05 Apr 2023 08:05) (133/70 - 173/80)  BP(mean): 115 (05 Apr 2023 08:05) (95 - 115)  RR: 20 (05 Apr 2023 08:05) (20 - 20)  SpO2: 98% (05 Apr 2023 08:05) (95% - 100%)    O2 Parameters below as of 05 Apr 2023 08:05  Patient On (Oxygen Delivery Method): nasal cannula      General: ill appearing  HEENT:  TREVOR              Lungs: decreased BS bilaterally, bilateral crackles  Cardiovascular: Regular  Abdomen: Soft, Positive BS  Extremities: No clubbing  Skin: Warm  Neurological: Non focal       04-04-23 @ 07:01  -  04-05-23 @ 07:00  --------------------------------------------------------  IN:    Oral Fluid: 550 mL  Total IN: 550 mL    OUT:    Voided (mL): 1250 mL  Total OUT: 1250 mL    Total NET: -700 mL      LABS:                        8.8    10.44 )-----------( 508      ( 05 Apr 2023 01:28 )             27.9     130<L>  |  93<L>  |  46<H>  ----------------------------<  85  4.5   |  27  |  1.4    Ca    9.8      05 Apr 2023 01:28  Mg     2.4     04-05    TPro  6.5  /  Alb  2.7<L>  /  TBili  <0.2  /  DBili  x   /  AST  16  /  ALT  10  /  AlkPhos  114  04-05      CARDIAC MARKERS ( 03 Apr 2023 16:44 )  x     / 0.03 ng/mL / x     / x     / x      CARDIAC MARKERS ( 03 Apr 2023 11:42 )  x     / 0.03 ng/mL / x     / x     / x        LIVER FUNCTIONS - ( 05 Apr 2023 01:28 )  Alb: 2.7 g/dL / Pro: 6.5 g/dL / ALK PHOS: 114 U/L / ALT: 10 U/L / AST: 16 U/L / GGT: x                                                Culture - Blood (collected 03 Apr 2023 05:55)  Source: .Blood Blood-Venous  Preliminary Report (04 Apr 2023 13:02):    No growth to date.    Culture - Blood (collected 03 Apr 2023 05:55)  Source: .Blood Blood-Venous  Preliminary Report (04 Apr 2023 13:02):    No growth to date.    Culture - Urine (collected 03 Apr 2023 05:15)  Source: Clean Catch Clean Catch (Midstream)  Final Report (04 Apr 2023 08:12):    >=3 organisms. Probable collection contamination.                                                                      MEDICATIONS  (STANDING):  albuterol/ipratropium for Nebulization 3 milliLiter(s) Nebulizer every 6 hours  amLODIPine   Tablet 2.5 milliGRAM(s) Oral daily  atorvastatin 20 milliGRAM(s) Oral at bedtime  budesonide  80 MICROgram(s)/formoterol 4.5 MICROgram(s) Inhaler 2 Puff(s) Inhalation two times a day  cefepime   IVPB      cefepime   IVPB 1000 milliGRAM(s) IV Intermittent every 24 hours  dextrose 5%. 1000 milliLiter(s) (50 mL/Hr) IV Continuous <Continuous>  dextrose 5%. 1000 milliLiter(s) (100 mL/Hr) IV Continuous <Continuous>  dextrose 50% Injectable 25 Gram(s) IV Push once  dextrose 50% Injectable 12.5 Gram(s) IV Push once  dextrose 50% Injectable 25 Gram(s) IV Push once  enoxaparin Injectable 50 milliGRAM(s) SubCutaneous every 24 hours  glucagon  Injectable 1 milliGRAM(s) IntraMuscular once  insulin glargine Injectable (LANTUS) 10 Unit(s) SubCutaneous every morning  insulin lispro (ADMELOG) corrective regimen sliding scale   SubCutaneous three times a day before meals  insulin lispro Injectable (ADMELOG) 3 Unit(s) SubCutaneous three times a day before meals  levoFLOXacin IVPB 500 milliGRAM(s) IV Intermittent every 24 hours  lisinopril 10 milliGRAM(s) Oral daily  pantoprazole    Tablet 40 milliGRAM(s) Oral two times a day  predniSONE   Tablet 40 milliGRAM(s) Oral daily  sucralfate 1 Gram(s) Oral two times a day  vancomycin  IVPB 750 milliGRAM(s) IV Intermittent every 12 hours    MEDICATIONS  (PRN):  acetaminophen     Tablet .. 650 milliGRAM(s) Oral every 6 hours PRN Temp greater or equal to 38C (100.4F), Mild Pain (1 - 3)  aluminum hydroxide/magnesium hydroxide/simethicone Suspension 30 milliLiter(s) Oral every 4 hours PRN Dyspepsia  dextrose Oral Gel 15 Gram(s) Oral once PRN Blood Glucose LESS THAN 70 milliGRAM(s)/deciliter

## 2023-04-06 LAB
ALBUMIN SERPL ELPH-MCNC: 2.5 G/DL — LOW (ref 3.5–5.2)
ALP SERPL-CCNC: 121 U/L — HIGH (ref 30–115)
ALT FLD-CCNC: 6 U/L — SIGNIFICANT CHANGE UP (ref 0–41)
ANION GAP SERPL CALC-SCNC: 8 MMOL/L — SIGNIFICANT CHANGE UP (ref 7–14)
AST SERPL-CCNC: 11 U/L — SIGNIFICANT CHANGE UP (ref 0–41)
BASOPHILS # BLD AUTO: 0.04 K/UL — SIGNIFICANT CHANGE UP (ref 0–0.2)
BASOPHILS NFR BLD AUTO: 0.4 % — SIGNIFICANT CHANGE UP (ref 0–1)
BILIRUB SERPL-MCNC: <0.2 MG/DL — SIGNIFICANT CHANGE UP (ref 0.2–1.2)
BUN SERPL-MCNC: 50 MG/DL — HIGH (ref 10–20)
CALCIUM SERPL-MCNC: 9.3 MG/DL — SIGNIFICANT CHANGE UP (ref 8.4–10.5)
CHLORIDE SERPL-SCNC: 100 MMOL/L — SIGNIFICANT CHANGE UP (ref 98–110)
CO2 SERPL-SCNC: 26 MMOL/L — SIGNIFICANT CHANGE UP (ref 17–32)
CREAT SERPL-MCNC: 1.6 MG/DL — HIGH (ref 0.7–1.5)
EGFR: 30 ML/MIN/1.73M2 — LOW
EOSINOPHIL # BLD AUTO: 0 K/UL — SIGNIFICANT CHANGE UP (ref 0–0.7)
EOSINOPHIL NFR BLD AUTO: 0 % — SIGNIFICANT CHANGE UP (ref 0–8)
GLUCOSE BLDC GLUCOMTR-MCNC: 120 MG/DL — HIGH (ref 70–99)
GLUCOSE BLDC GLUCOMTR-MCNC: 206 MG/DL — HIGH (ref 70–99)
GLUCOSE BLDC GLUCOMTR-MCNC: 237 MG/DL — HIGH (ref 70–99)
GLUCOSE BLDC GLUCOMTR-MCNC: 322 MG/DL — HIGH (ref 70–99)
GLUCOSE SERPL-MCNC: 130 MG/DL — HIGH (ref 70–99)
HCT VFR BLD CALC: 24.9 % — LOW (ref 37–47)
HGB BLD-MCNC: 7.9 G/DL — LOW (ref 12–16)
IMM GRANULOCYTES NFR BLD AUTO: 1.2 % — HIGH (ref 0.1–0.3)
LYMPHOCYTES # BLD AUTO: 1.86 K/UL — SIGNIFICANT CHANGE UP (ref 1.2–3.4)
LYMPHOCYTES # BLD AUTO: 18.1 % — LOW (ref 20.5–51.1)
MAGNESIUM SERPL-MCNC: 2 MG/DL — SIGNIFICANT CHANGE UP (ref 1.8–2.4)
MCHC RBC-ENTMCNC: 28.3 PG — SIGNIFICANT CHANGE UP (ref 27–31)
MCHC RBC-ENTMCNC: 31.7 G/DL — LOW (ref 32–37)
MCV RBC AUTO: 89.2 FL — SIGNIFICANT CHANGE UP (ref 81–99)
MONOCYTES # BLD AUTO: 0.71 K/UL — HIGH (ref 0.1–0.6)
MONOCYTES NFR BLD AUTO: 6.9 % — SIGNIFICANT CHANGE UP (ref 1.7–9.3)
NEUTROPHILS # BLD AUTO: 7.53 K/UL — HIGH (ref 1.4–6.5)
NEUTROPHILS NFR BLD AUTO: 73.4 % — SIGNIFICANT CHANGE UP (ref 42.2–75.2)
NRBC # BLD: 0 /100 WBCS — SIGNIFICANT CHANGE UP (ref 0–0)
PLATELET # BLD AUTO: 461 K/UL — HIGH (ref 130–400)
POTASSIUM SERPL-MCNC: 4.9 MMOL/L — SIGNIFICANT CHANGE UP (ref 3.5–5)
POTASSIUM SERPL-SCNC: 4.9 MMOL/L — SIGNIFICANT CHANGE UP (ref 3.5–5)
PROT SERPL-MCNC: 5.9 G/DL — LOW (ref 6–8)
RBC # BLD: 2.79 M/UL — LOW (ref 4.2–5.4)
RBC # FLD: 16.3 % — HIGH (ref 11.5–14.5)
S PNEUM AG UR QL: NEGATIVE — SIGNIFICANT CHANGE UP
SODIUM SERPL-SCNC: 134 MMOL/L — LOW (ref 135–146)
WBC # BLD: 10.26 K/UL — SIGNIFICANT CHANGE UP (ref 4.8–10.8)
WBC # FLD AUTO: 10.26 K/UL — SIGNIFICANT CHANGE UP (ref 4.8–10.8)

## 2023-04-06 PROCEDURE — 99233 SBSQ HOSP IP/OBS HIGH 50: CPT

## 2023-04-06 PROCEDURE — 71045 X-RAY EXAM CHEST 1 VIEW: CPT | Mod: 26

## 2023-04-06 RX ORDER — INSULIN LISPRO 100/ML
VIAL (ML) SUBCUTANEOUS AT BEDTIME
Refills: 0 | Status: DISCONTINUED | OUTPATIENT
Start: 2023-04-06 | End: 2023-04-19

## 2023-04-06 RX ORDER — CHLORHEXIDINE GLUCONATE 213 G/1000ML
1 SOLUTION TOPICAL
Refills: 0 | Status: DISCONTINUED | OUTPATIENT
Start: 2023-04-06 | End: 2023-04-19

## 2023-04-06 RX ORDER — HYDROMORPHONE HYDROCHLORIDE 2 MG/ML
0.25 INJECTION INTRAMUSCULAR; INTRAVENOUS; SUBCUTANEOUS ONCE
Refills: 0 | Status: DISCONTINUED | OUTPATIENT
Start: 2023-04-06 | End: 2023-04-06

## 2023-04-06 RX ADMIN — Medication 2: at 21:36

## 2023-04-06 RX ADMIN — APIXABAN 5 MILLIGRAM(S): 2.5 TABLET, FILM COATED ORAL at 17:04

## 2023-04-06 RX ADMIN — CHLORHEXIDINE GLUCONATE 1 APPLICATION(S): 213 SOLUTION TOPICAL at 21:13

## 2023-04-06 RX ADMIN — Medication 1 GRAM(S): at 05:43

## 2023-04-06 RX ADMIN — CEFEPIME 100 MILLIGRAM(S): 1 INJECTION, POWDER, FOR SOLUTION INTRAMUSCULAR; INTRAVENOUS at 08:17

## 2023-04-06 RX ADMIN — PANTOPRAZOLE SODIUM 40 MILLIGRAM(S): 20 TABLET, DELAYED RELEASE ORAL at 17:04

## 2023-04-06 RX ADMIN — Medication 3 MILLILITER(S): at 07:26

## 2023-04-06 RX ADMIN — PANTOPRAZOLE SODIUM 40 MILLIGRAM(S): 20 TABLET, DELAYED RELEASE ORAL at 05:43

## 2023-04-06 RX ADMIN — APIXABAN 5 MILLIGRAM(S): 2.5 TABLET, FILM COATED ORAL at 05:43

## 2023-04-06 RX ADMIN — Medication 30 MILLILITER(S): at 11:05

## 2023-04-06 RX ADMIN — Medication 3 UNIT(S): at 08:07

## 2023-04-06 RX ADMIN — HYDROMORPHONE HYDROCHLORIDE 0.25 MILLIGRAM(S): 2 INJECTION INTRAMUSCULAR; INTRAVENOUS; SUBCUTANEOUS at 21:45

## 2023-04-06 RX ADMIN — BUDESONIDE AND FORMOTEROL FUMARATE DIHYDRATE 2 PUFF(S): 160; 4.5 AEROSOL RESPIRATORY (INHALATION) at 21:14

## 2023-04-06 RX ADMIN — Medication 3 UNIT(S): at 17:02

## 2023-04-06 RX ADMIN — Medication 650 MILLIGRAM(S): at 18:53

## 2023-04-06 RX ADMIN — Medication 3 MILLILITER(S): at 19:57

## 2023-04-06 RX ADMIN — Medication 2: at 17:02

## 2023-04-06 RX ADMIN — Medication 1 GRAM(S): at 17:04

## 2023-04-06 RX ADMIN — Medication 2: at 12:25

## 2023-04-06 RX ADMIN — Medication 5 MILLIGRAM(S): at 21:13

## 2023-04-06 RX ADMIN — Medication 30 MILLILITER(S): at 15:06

## 2023-04-06 RX ADMIN — Medication 20 MILLIGRAM(S): at 05:43

## 2023-04-06 RX ADMIN — AMLODIPINE BESYLATE 2.5 MILLIGRAM(S): 2.5 TABLET ORAL at 05:43

## 2023-04-06 RX ADMIN — ATORVASTATIN CALCIUM 20 MILLIGRAM(S): 80 TABLET, FILM COATED ORAL at 21:18

## 2023-04-06 RX ADMIN — LISINOPRIL 10 MILLIGRAM(S): 2.5 TABLET ORAL at 05:43

## 2023-04-06 RX ADMIN — HYDROMORPHONE HYDROCHLORIDE 0.25 MILLIGRAM(S): 2 INJECTION INTRAMUSCULAR; INTRAVENOUS; SUBCUTANEOUS at 21:15

## 2023-04-06 RX ADMIN — Medication 3 MILLILITER(S): at 14:47

## 2023-04-06 RX ADMIN — Medication 3 UNIT(S): at 12:24

## 2023-04-06 RX ADMIN — BUDESONIDE AND FORMOTEROL FUMARATE DIHYDRATE 2 PUFF(S): 160; 4.5 AEROSOL RESPIRATORY (INHALATION) at 08:12

## 2023-04-06 NOTE — PROGRESS NOTE ADULT - ASSESSMENT
89 year old female withPMHx of HTN, DM, CKD, active smoker, COPD not on home O2 recent admission for aspiration PNA with incidental finding of distal DVT presented to the ED for hypoxia    Acute Hypoxic respiratory failure Likely secondary to Aspiration PNA/Bilateral with sepsis POA  COPD not on home 02  Active smoker   Recent Distal DVT  currently on 3L NC, improving  RVP negative strep, legionella, MRSA, procal 0.14   duplex-> dvt in left gastrocnemius   ct angio- neg for PE   On cefepime and LEvaquin, f/u pending cultures, plan for 7 day course   continue with prednisone taper  f/u echo  diet per speech and swallow   - PT  nicotine patch     LLE distal DVT   - Duplex (3/24/23) noted thrombus in left gastrocnemius and peroneal veins  - repeat duplex unchanged  - started Eliquis     hyponatremia - improved  134 today, ecourage PO hydration  HCTZ on hold    Dyspepsia  Peptic Ulcer Disease  - continue with protonix BID, and Carafate     CKD III  - Cr  1.4 on admission, stable   - was following with Nephrology in [place]  - encourage PO intake, if Scr uptrending, hold lisinopril    HTN  - was on amlodipine 2.5 mg daily, HCTZ 12.5mg daily and lisinopril 10mg daily in assisted living   - holding HCTZ     DM  - takes pioglitazone 15mg daily  - c/w insulin lantus 10U at bedtime, lispro 3U TID ; monitor FS  - A1c 7.4% (3/2023)    DNR/DNI    Pending: Echo, labs, IV abx, pt

## 2023-04-06 NOTE — PROGRESS NOTE ADULT - SUBJECTIVE AND OBJECTIVE BOX
RAUL LYNN  89y Female    CHIEF COMPLAINT:    Patient is a 89y old  Female who presents with a chief complaint of SOB (2023 10:36)    INTERVAL HPI/OVERNIGHT EVENTS:    Patient seen and examined. No acute events overnight. Better on 3L NC, tachycardia improving     ROS: All other systems are negative.    Vital Signs:    T(F): 98.2 (23 @ 08:17), Max: 98.9 (23 @ 00:00)  HR: 86 (23 @ 08:17) (86 - 108)  BP: 150/70 (23 @ 08:17) (112/59 - 150/70)  RR: 20 (23 @ 08:17) (20 - 20)  SpO2: 99% (23 @ 08:17) (98% - 100%)    2023 07:01  -  2023 07:00  --------------------------------------------------------  IN: 0 mL / OUT: 2000 mL / NET: -2000 mL    Daily Weight in k.2 (2023 00:41)    POCT Blood Glucose.: 120 mg/dL (2023 07:41)  POCT Blood Glucose.: 276 mg/dL (2023 20:33)  POCT Blood Glucose.: 261 mg/dL (2023 16:21)  POCT Blood Glucose.: 138 mg/dL (2023 11:32)    PHYSICAL EXAM:    GENERAL:  NAD, chronically ill appearing   SKIN: No rashes or lesions  HEENT: Atraumatic. Normocephalic.   NECK: Supple, No JVD.   PULMONARY: poor air entry B/L. No wheezing. No rales  CVS: Normal S1, S2. Rate and Rhythm are regular.    ABDOMEN/GI: Soft, Nontender, Nondistended   MSK:  No clubbing or cyanosis   NEUROLOGIC: moves all extremities   PSYCH: Alert & oriented x 3     Consultant(s) Notes Reviewed:  [x ] YES  [ ] NO  Care Discussed with Consultants/Other Providers [ x] YES  [ ] NO    LABS:                        7.9    10.26 )-----------( 461      ( 2023 01:29 )             24.9     134<L>  |  100  |  50<H>  ----------------------------<  130<H>  4.9   |  26  |  1.6<H>    Ca    9.3      2023 01:29  Mg     2.0     04-06    TPro  5.9<L>  /  Alb  2.5<L>  /  TBili  <0.2  /  DBili  x   /  AST  11  /  ALT  6   /  AlkPhos  121<H>  04-    Trop 0.03, CKMB --, CK --, 23 @ 16:44  Trop 0.03, CKMB --, CK --, 23 @ 11:42    RADIOLOGY & ADDITIONAL TESTS:  Imaging or report Personally Reviewed:  [x] YES  [ ] NO  EKG reviewed: [x] YES  [ ] NO    Medications:  Standing  albuterol/ipratropium for Nebulization 3 milliLiter(s) Nebulizer every 6 hours  amLODIPine   Tablet 2.5 milliGRAM(s) Oral daily  apixaban 5 milliGRAM(s) Oral every 12 hours  atorvastatin 20 milliGRAM(s) Oral at bedtime  budesonide  80 MICROgram(s)/formoterol 4.5 MICROgram(s) Inhaler 2 Puff(s) Inhalation two times a day  cefepime   IVPB      cefepime   IVPB 1000 milliGRAM(s) IV Intermittent every 24 hours  dextrose 5%. 1000 milliLiter(s) IV Continuous <Continuous>  dextrose 5%. 1000 milliLiter(s) IV Continuous <Continuous>  dextrose 50% Injectable 25 Gram(s) IV Push once  dextrose 50% Injectable 12.5 Gram(s) IV Push once  dextrose 50% Injectable 25 Gram(s) IV Push once  glucagon  Injectable 1 milliGRAM(s) IntraMuscular once  insulin glargine Injectable (LANTUS) 10 Unit(s) SubCutaneous every morning  insulin lispro (ADMELOG) corrective regimen sliding scale   SubCutaneous three times a day before meals  insulin lispro Injectable (ADMELOG) 3 Unit(s) SubCutaneous three times a day before meals  levoFLOXacin IVPB 500 milliGRAM(s) IV Intermittent every 24 hours  lisinopril 10 milliGRAM(s) Oral daily  melatonin 5 milliGRAM(s) Oral at bedtime  pantoprazole    Tablet 40 milliGRAM(s) Oral two times a day  predniSONE   Tablet 20 milliGRAM(s) Oral daily  sucralfate 1 Gram(s) Oral two times a day    PRN Meds  acetaminophen     Tablet .. 650 milliGRAM(s) Oral every 6 hours PRN  aluminum hydroxide/magnesium hydroxide/simethicone Suspension 30 milliLiter(s) Oral every 4 hours PRN  dextrose Oral Gel 15 Gram(s) Oral once PRN

## 2023-04-06 NOTE — PROGRESS NOTE ADULT - ASSESSMENT
89 year old female withPMHx of HTN, DM, CKD, active smoker, COPD not on home O2 recent admission for aspiration PNA with incidental finding of distal DVT presented to the ED for hypoxia    #Acute Hypoxic Respiratory Failure likely secondary to aspiration PNA vs COPD exacerbation  #Active Smoker   #COPD/emphysema not on home O2  #Recent Distal LLE DVT   - 1 day of SOB, productive cough  - CXR --> bilateral interstitial opacifications   - currently on 4L NC, saturating 100%  - WBC 8, Lactate 1.6, VBG pCO2: 47  - Trop 0.04, BNP 1779  - s/p Azithro, Cefepime, flagyl in ED  - RVP negative strep, legionella, MRSA   - hx of distal DVT, now hypoxic and tachycardic   >> duplex-> dvt in left gastrocnemius   >> ct angio- neg for PE   - as per last pulm eval: has significant emphysema on CT and likely significant COPD ; unlikely to be able to perform PFTs   - s/p solumedrol 125mg in ED, continue with solumedrol 40mg daily   - wean O2 to 89-92%  - as per family, this is patient's 3rd admission for aspiration PNA in past 4 months   - procal- 0.14  - c/w cefepime, levaquin     #LLE distal DVT   - Duplex (3/24/23) noted thrombus in left gastrocnemius and peroneal veins  - evaluated by Vascular; no need for AC, repeat LE duplex in 1 week  - LE duplex-   >> DVT noted in the left gastrocnemius vein. Superficial thrombosis noted in the bilateral greater saphenous    #Hypercalcemia  - Ca 10.7 (corrected 11.6)  - hold HCTZ  - gentle hydration for 24hrs     #Dyspepsia  #Peptic Ulcer Disease  - continue with protonix BID, and Carafate   - endoscopy was not performed before due to age and comorbidities   - GI f/u OP on Wednesday    #CKD   - Cr  1.4 on admission   - was following with Nephrology in [place]    #Elevated Troponins  - Trops 0.04 (previously 0.04 > 0.03)  - no chest pain    #Normocytic Anemia  - Hg 10.4 (unknown baseline)  - keep active T&S, transfuse if Hg<7    #HTN  - was on amlodipine 2.5 mg daily, HCTZ 12.5mg daily and lisinopril 10mg daily in assisted living   - hold HCTZ for hypercalcemia   - continue with lisinopril and amlodipine     #DM  - takes pioglitazone 15mg daily  - c/w insulin lantus 9U at bedtime, lispro 3U TID ; monitor FS  - A1c 7.4% (3/2023)    #DVT PPx: lovenox  #GI PPx: PPI  #Diet: Soft and Bite Sized   DNR/DNI

## 2023-04-06 NOTE — PROGRESS NOTE ADULT - ASSESSMENT
IMPRESSION:    Sepsis present on admission, improving  Acute Hypoxemic Respiratory Failure improved on NC  Bilateral multilobar PNA / Possible aspiration, improving  COPD, not in acute exacerbation  Current smoker  Recent distal left gastrocnemius DVT  Hypomagnesemia, resolved  HO CKD      PLAN:    CNS: No depressants    HEENT: Oral care    PULMONARY: HOB @ 45 degrees.  Aspiration precautions.  Incentive spirometer.  Target POx 88 - 92%.  Wean O2 as tolerated.  Prednisone taper.  c/w home inhalers.  Duoneb q12h & q4h PRN.  CXR.    CARDIOVASCULAR:  Target MAP > 65.  Keep I = O.  ECHO.    GASTROENTEROLOGY: GI prophylaxis.  Feeding as tolerated.  Bowel regimen if needed.    RENAL:  FU lytes.  Correct as necessary.  Monitor UO.    INFECTIOUS:  c/w Cefepime & Levofloxacin total 5-7 days.    Panculture.  FU cultures & sensitivities.    ENDOCRINOLOGY:  FU FS.  Insulin protocol if needed.  FU TSH.    HEMATOLOGY:  Eliquis.  LE-VDUS reviewed unchanged from prior.    MUSCULOSKELETAL: OOB to chair, PT/OT    CODE STATUS: DNR DNI    DISPO: SDU for now, possible DGTF in PM IMPRESSION:    Sepsis present on admission, improving  Acute Hypoxemic Respiratory Failure improved on NC  Bilateral multilobar PNA / Possible aspiration, improving  COPD, not in acute exacerbation  Current smoker  Recent distal left gastrocnemius DVT  Hypomagnesemia, resolved  HO CKD      PLAN:    CNS: No depressants    HEENT: Oral care    PULMONARY: HOB @ 45 degrees.  Aspiration precautions.  Incentive spirometer.  Target POx 88 - 92%.  Wean O2 as tolerated.  Prednisone taper.  c/w home inhalers.  Duoneb q12h & q4h PRN.  CXR.    CARDIOVASCULAR:  Target MAP > 65.  Keep I = O.  ECHO.    GASTROENTEROLOGY: GI prophylaxis.  Feeding as tolerated.  Bowel regimen if needed.    RENAL:  FU lytes.  Correct as necessary.  Monitor UO.    INFECTIOUS:  c/w Cefepime & Levofloxacin total 5-7 days.    Panculture.  FU cultures & sensitivities.    ENDOCRINOLOGY:  FU FS.  Insulin protocol if needed.  FU TSH.    HEMATOLOGY:  Eliquis.  LE-VDUS reviewed unchanged from prior.    MUSCULOSKELETAL: OOB to chair, PT/OT    CODE STATUS: DNR DNI

## 2023-04-06 NOTE — PROGRESS NOTE ADULT - SUBJECTIVE AND OBJECTIVE BOX
Patient is a 89y old  Female who presents with a chief complaint of SOB (06 Apr 2023 08:46)      INTERVAL HPI/OVERNIGHT EVENTS:   No overnight events   Afebrile, hemodynamically stable     This AM, on 3L NC     ICU Vital Signs Last 24 Hrs  T(C): 36.8 (06 Apr 2023 08:17), Max: 37.2 (06 Apr 2023 00:00)  T(F): 98.2 (06 Apr 2023 08:17), Max: 98.9 (06 Apr 2023 00:00)  HR: 86 (06 Apr 2023 08:17) (86 - 108)  BP: 150/70 (06 Apr 2023 08:17) (112/59 - 150/70)  BP(mean): 100 (06 Apr 2023 08:17) (89 - 102)  ABP: --  ABP(mean): --  RR: 20 (06 Apr 2023 08:17) (20 - 20)  SpO2: 99% (06 Apr 2023 08:17) (98% - 100%)    O2 Parameters below as of 06 Apr 2023 08:17  Patient On (Oxygen Delivery Method): nasal cannula          I&O's Summary    05 Apr 2023 07:01  -  06 Apr 2023 07:00  --------------------------------------------------------  IN: 0 mL / OUT: 2000 mL / NET: -2000 mL          LABS:                        7.9    10.26 )-----------( 461      ( 06 Apr 2023 01:29 )             24.9     04-06    134<L>  |  100  |  50<H>  ----------------------------<  130<H>  4.9   |  26  |  1.6<H>    Ca    9.3      06 Apr 2023 01:29  Mg     2.0     04-06    TPro  5.9<L>  /  Alb  2.5<L>  /  TBili  <0.2  /  DBili  x   /  AST  11  /  ALT  6   /  AlkPhos  121<H>  04-06        CAPILLARY BLOOD GLUCOSE      POCT Blood Glucose.: 120 mg/dL (06 Apr 2023 07:41)  POCT Blood Glucose.: 276 mg/dL (05 Apr 2023 20:33)  POCT Blood Glucose.: 261 mg/dL (05 Apr 2023 16:21)  POCT Blood Glucose.: 138 mg/dL (05 Apr 2023 11:32)    ABG - ( 05 Apr 2023 10:43 )  pH, Arterial: 7.45  pH, Blood: x     /  pCO2: 42    /  pO2: 87    / HCO3: 29    / Base Excess: 4.7   /  SaO2: 98.7                RADIOLOGY & ADDITIONAL TESTS:    Consultant(s) Notes Reviewed:  [x ] YES  [ ] NO    MEDICATIONS  (STANDING):  albuterol/ipratropium for Nebulization 3 milliLiter(s) Nebulizer every 6 hours  amLODIPine   Tablet 2.5 milliGRAM(s) Oral daily  apixaban 5 milliGRAM(s) Oral every 12 hours  atorvastatin 20 milliGRAM(s) Oral at bedtime  budesonide  80 MICROgram(s)/formoterol 4.5 MICROgram(s) Inhaler 2 Puff(s) Inhalation two times a day  cefepime   IVPB      cefepime   IVPB 1000 milliGRAM(s) IV Intermittent every 24 hours  dextrose 5%. 1000 milliLiter(s) (50 mL/Hr) IV Continuous <Continuous>  dextrose 5%. 1000 milliLiter(s) (100 mL/Hr) IV Continuous <Continuous>  dextrose 50% Injectable 25 Gram(s) IV Push once  dextrose 50% Injectable 12.5 Gram(s) IV Push once  dextrose 50% Injectable 25 Gram(s) IV Push once  glucagon  Injectable 1 milliGRAM(s) IntraMuscular once  insulin glargine Injectable (LANTUS) 10 Unit(s) SubCutaneous every morning  insulin lispro (ADMELOG) corrective regimen sliding scale   SubCutaneous three times a day before meals  insulin lispro Injectable (ADMELOG) 3 Unit(s) SubCutaneous three times a day before meals  levoFLOXacin IVPB 500 milliGRAM(s) IV Intermittent every 24 hours  lisinopril 10 milliGRAM(s) Oral daily  melatonin 5 milliGRAM(s) Oral at bedtime  pantoprazole    Tablet 40 milliGRAM(s) Oral two times a day  predniSONE   Tablet 20 milliGRAM(s) Oral daily  sucralfate 1 Gram(s) Oral two times a day    MEDICATIONS  (PRN):  acetaminophen     Tablet .. 650 milliGRAM(s) Oral every 6 hours PRN Temp greater or equal to 38C (100.4F), Mild Pain (1 - 3)  aluminum hydroxide/magnesium hydroxide/simethicone Suspension 30 milliLiter(s) Oral every 4 hours PRN Dyspepsia  dextrose Oral Gel 15 Gram(s) Oral once PRN Blood Glucose LESS THAN 70 milliGRAM(s)/deciliter      PHYSICAL EXAM:  GENERAL:   HEAD:  Atraumatic, Normocephalic  EYES: EOMI, PERRLA, conjunctiva and sclera clear  NECK: Supple, No JVD, Normal thyroid, no enlarged nodes  NERVOUS SYSTEM:  Alert & Awake.   CHEST/LUNG: B/L good air entry; No rales, rhonchi, or wheezing  HEART: S1S2 normal, no S3, Regular rate and rhythm; No murmurs  ABDOMEN: Soft, Nontender, Nondistended; Bowel sounds present  EXTREMITIES:  2+ Peripheral Pulses, No clubbing, cyanosis, or edema  LYMPH: No lymphadenopathy noted  SKIN: No rashes or lesions    Care Discussed with Consultants/Other Providers [ x] YES  [ ] NO

## 2023-04-06 NOTE — PROGRESS NOTE ADULT - SUBJECTIVE AND OBJECTIVE BOX
Over Night Events:  On 3L NC    PHYSICAL EXAM  ICU Vital Signs Last 24 Hrs  T(C): 36.8 (06 Apr 2023 08:17), Max: 37.2 (06 Apr 2023 00:00)  T(F): 98.2 (06 Apr 2023 08:17), Max: 98.9 (06 Apr 2023 00:00)  HR: 86 (06 Apr 2023 08:17) (86 - 108)  BP: 150/70 (06 Apr 2023 08:17) (112/59 - 150/70)  BP(mean): 100 (06 Apr 2023 08:17) (89 - 102)  RR: 20 (06 Apr 2023 08:17) (20 - 20)  SpO2: 99% (06 Apr 2023 08:17) (98% - 100%)    O2 Parameters below as of 06 Apr 2023 08:17  Patient On (Oxygen Delivery Method): nasal cannula    General: ill appearing  HEENT:  TREVOR              Lungs: decreased BS bilaterally, bilateral crackles  Cardiovascular: Regular  Abdomen: Soft, Positive BS  Extremities: No clubbing  Skin: Warm  Neurological: Non focal       04-05-23 @ 07:01  -  04-06-23 @ 07:00  --------------------------------------------------------  IN:  Total IN: 0 mL    OUT:    Voided (mL): 2000 mL  Total OUT: 2000 mL    Total NET: -2000 mL      LABS:             7.9    10.26 )-----------( 461      ( 06 Apr 2023 01:29 )             24.9     134<L>  |  100  |  50<H>  ----------------------------<  130<H>  4.9   |  26  |  1.6<H>    Ca    9.3      06 Apr 2023 01:29  Mg     2.0     04-06    TPro  5.9<L>  /  Alb  2.5<L>  /  TBili  <0.2  /  DBili  x   /  AST  11  /  ALT  6   /  AlkPhos  121<H>  04-06    LIVER FUNCTIONS - ( 06 Apr 2023 01:29 )  Alb: 2.5 g/dL / Pro: 5.9 g/dL / ALK PHOS: 121 U/L / ALT: 6 U/L / AST: 11 U/L / GGT: x                                              ABG - ( 05 Apr 2023 10:43 )  pH, Arterial: 7.45  pH, Blood: x     /  pCO2: 42    /  pO2: 87    / HCO3: 29    / Base Excess: 4.7   /  SaO2: 98.7        MEDICATIONS  (STANDING):  albuterol/ipratropium for Nebulization 3 milliLiter(s) Nebulizer every 6 hours  amLODIPine   Tablet 2.5 milliGRAM(s) Oral daily  apixaban 5 milliGRAM(s) Oral every 12 hours  atorvastatin 20 milliGRAM(s) Oral at bedtime  budesonide  80 MICROgram(s)/formoterol 4.5 MICROgram(s) Inhaler 2 Puff(s) Inhalation two times a day  cefepime   IVPB      cefepime   IVPB 1000 milliGRAM(s) IV Intermittent every 24 hours  dextrose 5%. 1000 milliLiter(s) (50 mL/Hr) IV Continuous <Continuous>  dextrose 5%. 1000 milliLiter(s) (100 mL/Hr) IV Continuous <Continuous>  dextrose 50% Injectable 25 Gram(s) IV Push once  dextrose 50% Injectable 12.5 Gram(s) IV Push once  dextrose 50% Injectable 25 Gram(s) IV Push once  glucagon  Injectable 1 milliGRAM(s) IntraMuscular once  insulin glargine Injectable (LANTUS) 10 Unit(s) SubCutaneous every morning  insulin lispro (ADMELOG) corrective regimen sliding scale   SubCutaneous three times a day before meals  insulin lispro Injectable (ADMELOG) 3 Unit(s) SubCutaneous three times a day before meals  levoFLOXacin IVPB 500 milliGRAM(s) IV Intermittent every 24 hours  lisinopril 10 milliGRAM(s) Oral daily  melatonin 5 milliGRAM(s) Oral at bedtime  pantoprazole    Tablet 40 milliGRAM(s) Oral two times a day  predniSONE   Tablet 20 milliGRAM(s) Oral daily  sucralfate 1 Gram(s) Oral two times a day  vancomycin  IVPB 750 milliGRAM(s) IV Intermittent every 12 hours    MEDICATIONS  (PRN):  acetaminophen     Tablet .. 650 milliGRAM(s) Oral every 6 hours PRN Temp greater or equal to 38C (100.4F), Mild Pain (1 - 3)  aluminum hydroxide/magnesium hydroxide/simethicone Suspension 30 milliLiter(s) Oral every 4 hours PRN Dyspepsia  dextrose Oral Gel 15 Gram(s) Oral once PRN Blood Glucose LESS THAN 70 milliGRAM(s)/deciliter   Over Night Events:  On 3L NC, afebrile    PHYSICAL EXAM  ICU Vital Signs Last 24 Hrs  T(C): 36.8 (06 Apr 2023 08:17), Max: 37.2 (06 Apr 2023 00:00)  T(F): 98.2 (06 Apr 2023 08:17), Max: 98.9 (06 Apr 2023 00:00)  HR: 86 (06 Apr 2023 08:17) (86 - 108)  BP: 150/70 (06 Apr 2023 08:17) (112/59 - 150/70)  BP(mean): 100 (06 Apr 2023 08:17) (89 - 102)  RR: 20 (06 Apr 2023 08:17) (20 - 20)  SpO2: 99% (06 Apr 2023 08:17) (98% - 100%)    O2 Parameters below as of 06 Apr 2023 08:17  Patient On (Oxygen Delivery Method): nasal cannula    General: ill appearing  HEENT:  TREVOR              Lungs: decreased BS bilaterally, bilateral crackles  Cardiovascular: Regular  Abdomen: Soft, Positive BS  Extremities: No clubbing  Skin: Warm  Neurological: Non focal       04-05-23 @ 07:01  -  04-06-23 @ 07:00  --------------------------------------------------------  IN:  Total IN: 0 mL    OUT:    Voided (mL): 2000 mL  Total OUT: 2000 mL    Total NET: -2000 mL      LABS:             7.9    10.26 )-----------( 461      ( 06 Apr 2023 01:29 )             24.9     134<L>  |  100  |  50<H>  ----------------------------<  130<H>  4.9   |  26  |  1.6<H>    Ca    9.3      06 Apr 2023 01:29  Mg     2.0     04-06    TPro  5.9<L>  /  Alb  2.5<L>  /  TBili  <0.2  /  DBili  x   /  AST  11  /  ALT  6   /  AlkPhos  121<H>  04-06    LIVER FUNCTIONS - ( 06 Apr 2023 01:29 )  Alb: 2.5 g/dL / Pro: 5.9 g/dL / ALK PHOS: 121 U/L / ALT: 6 U/L / AST: 11 U/L / GGT: x                                              ABG - ( 05 Apr 2023 10:43 )  pH, Arterial: 7.45  pH, Blood: x     /  pCO2: 42    /  pO2: 87    / HCO3: 29    / Base Excess: 4.7   /  SaO2: 98.7        MEDICATIONS  (STANDING):  albuterol/ipratropium for Nebulization 3 milliLiter(s) Nebulizer every 6 hours  amLODIPine   Tablet 2.5 milliGRAM(s) Oral daily  apixaban 5 milliGRAM(s) Oral every 12 hours  atorvastatin 20 milliGRAM(s) Oral at bedtime  budesonide  80 MICROgram(s)/formoterol 4.5 MICROgram(s) Inhaler 2 Puff(s) Inhalation two times a day  cefepime   IVPB      cefepime   IVPB 1000 milliGRAM(s) IV Intermittent every 24 hours  dextrose 5%. 1000 milliLiter(s) (50 mL/Hr) IV Continuous <Continuous>  dextrose 5%. 1000 milliLiter(s) (100 mL/Hr) IV Continuous <Continuous>  dextrose 50% Injectable 25 Gram(s) IV Push once  dextrose 50% Injectable 12.5 Gram(s) IV Push once  dextrose 50% Injectable 25 Gram(s) IV Push once  glucagon  Injectable 1 milliGRAM(s) IntraMuscular once  insulin glargine Injectable (LANTUS) 10 Unit(s) SubCutaneous every morning  insulin lispro (ADMELOG) corrective regimen sliding scale   SubCutaneous three times a day before meals  insulin lispro Injectable (ADMELOG) 3 Unit(s) SubCutaneous three times a day before meals  levoFLOXacin IVPB 500 milliGRAM(s) IV Intermittent every 24 hours  lisinopril 10 milliGRAM(s) Oral daily  melatonin 5 milliGRAM(s) Oral at bedtime  pantoprazole    Tablet 40 milliGRAM(s) Oral two times a day  predniSONE   Tablet 20 milliGRAM(s) Oral daily  sucralfate 1 Gram(s) Oral two times a day  vancomycin  IVPB 750 milliGRAM(s) IV Intermittent every 12 hours    MEDICATIONS  (PRN):  acetaminophen     Tablet .. 650 milliGRAM(s) Oral every 6 hours PRN Temp greater or equal to 38C (100.4F), Mild Pain (1 - 3)  aluminum hydroxide/magnesium hydroxide/simethicone Suspension 30 milliLiter(s) Oral every 4 hours PRN Dyspepsia  dextrose Oral Gel 15 Gram(s) Oral once PRN Blood Glucose LESS THAN 70 milliGRAM(s)/deciliter

## 2023-04-07 LAB
ALBUMIN SERPL ELPH-MCNC: 2.7 G/DL — LOW (ref 3.5–5.2)
ALP SERPL-CCNC: 114 U/L — SIGNIFICANT CHANGE UP (ref 30–115)
ALT FLD-CCNC: 8 U/L — SIGNIFICANT CHANGE UP (ref 0–41)
ANION GAP SERPL CALC-SCNC: 11 MMOL/L — SIGNIFICANT CHANGE UP (ref 7–14)
AST SERPL-CCNC: 11 U/L — SIGNIFICANT CHANGE UP (ref 0–41)
BASOPHILS # BLD AUTO: 0.04 K/UL — SIGNIFICANT CHANGE UP (ref 0–0.2)
BASOPHILS NFR BLD AUTO: 0.4 % — SIGNIFICANT CHANGE UP (ref 0–1)
BILIRUB SERPL-MCNC: 0.3 MG/DL — SIGNIFICANT CHANGE UP (ref 0.2–1.2)
BUN SERPL-MCNC: 50 MG/DL — HIGH (ref 10–20)
CALCIUM SERPL-MCNC: 9.1 MG/DL — SIGNIFICANT CHANGE UP (ref 8.4–10.5)
CHLORIDE SERPL-SCNC: 98 MMOL/L — SIGNIFICANT CHANGE UP (ref 98–110)
CO2 SERPL-SCNC: 25 MMOL/L — SIGNIFICANT CHANGE UP (ref 17–32)
CREAT SERPL-MCNC: 1.7 MG/DL — HIGH (ref 0.7–1.5)
EGFR: 28 ML/MIN/1.73M2 — LOW
EOSINOPHIL # BLD AUTO: 0.01 K/UL — SIGNIFICANT CHANGE UP (ref 0–0.7)
EOSINOPHIL NFR BLD AUTO: 0.1 % — SIGNIFICANT CHANGE UP (ref 0–8)
GLUCOSE BLDC GLUCOMTR-MCNC: 139 MG/DL — HIGH (ref 70–99)
GLUCOSE BLDC GLUCOMTR-MCNC: 147 MG/DL — HIGH (ref 70–99)
GLUCOSE BLDC GLUCOMTR-MCNC: 155 MG/DL — HIGH (ref 70–99)
GLUCOSE BLDC GLUCOMTR-MCNC: 212 MG/DL — HIGH (ref 70–99)
GLUCOSE SERPL-MCNC: 143 MG/DL — HIGH (ref 70–99)
HCT VFR BLD CALC: 26.6 % — LOW (ref 37–47)
HGB BLD-MCNC: 8.4 G/DL — LOW (ref 12–16)
IMM GRANULOCYTES NFR BLD AUTO: 2.3 % — HIGH (ref 0.1–0.3)
LYMPHOCYTES # BLD AUTO: 1.95 K/UL — SIGNIFICANT CHANGE UP (ref 1.2–3.4)
LYMPHOCYTES # BLD AUTO: 17.5 % — LOW (ref 20.5–51.1)
MAGNESIUM SERPL-MCNC: 2 MG/DL — SIGNIFICANT CHANGE UP (ref 1.8–2.4)
MCHC RBC-ENTMCNC: 28.6 PG — SIGNIFICANT CHANGE UP (ref 27–31)
MCHC RBC-ENTMCNC: 31.6 G/DL — LOW (ref 32–37)
MCV RBC AUTO: 90.5 FL — SIGNIFICANT CHANGE UP (ref 81–99)
MONOCYTES # BLD AUTO: 1.05 K/UL — HIGH (ref 0.1–0.6)
MONOCYTES NFR BLD AUTO: 9.4 % — HIGH (ref 1.7–9.3)
NEUTROPHILS # BLD AUTO: 7.83 K/UL — HIGH (ref 1.4–6.5)
NEUTROPHILS NFR BLD AUTO: 70.3 % — SIGNIFICANT CHANGE UP (ref 42.2–75.2)
NRBC # BLD: 0 /100 WBCS — SIGNIFICANT CHANGE UP (ref 0–0)
PLATELET # BLD AUTO: 492 K/UL — HIGH (ref 130–400)
POTASSIUM SERPL-MCNC: 4.9 MMOL/L — SIGNIFICANT CHANGE UP (ref 3.5–5)
POTASSIUM SERPL-SCNC: 4.9 MMOL/L — SIGNIFICANT CHANGE UP (ref 3.5–5)
PROT SERPL-MCNC: 6 G/DL — SIGNIFICANT CHANGE UP (ref 6–8)
RBC # BLD: 2.94 M/UL — LOW (ref 4.2–5.4)
RBC # FLD: 16.4 % — HIGH (ref 11.5–14.5)
SODIUM SERPL-SCNC: 134 MMOL/L — LOW (ref 135–146)
WBC # BLD: 11.14 K/UL — HIGH (ref 4.8–10.8)
WBC # FLD AUTO: 11.14 K/UL — HIGH (ref 4.8–10.8)

## 2023-04-07 PROCEDURE — 71045 X-RAY EXAM CHEST 1 VIEW: CPT | Mod: 26

## 2023-04-07 PROCEDURE — 99233 SBSQ HOSP IP/OBS HIGH 50: CPT

## 2023-04-07 PROCEDURE — 93306 TTE W/DOPPLER COMPLETE: CPT | Mod: 26

## 2023-04-07 PROCEDURE — 99232 SBSQ HOSP IP/OBS MODERATE 35: CPT

## 2023-04-07 RX ORDER — ACETAMINOPHEN 500 MG
650 TABLET ORAL EVERY 6 HOURS
Refills: 0 | Status: DISCONTINUED | OUTPATIENT
Start: 2023-04-07 | End: 2023-04-19

## 2023-04-07 RX ORDER — IPRATROPIUM/ALBUTEROL SULFATE 18-103MCG
3 AEROSOL WITH ADAPTER (GRAM) INHALATION EVERY 12 HOURS
Refills: 0 | Status: DISCONTINUED | OUTPATIENT
Start: 2023-04-07 | End: 2023-04-19

## 2023-04-07 RX ORDER — IPRATROPIUM/ALBUTEROL SULFATE 18-103MCG
3 AEROSOL WITH ADAPTER (GRAM) INHALATION EVERY 4 HOURS
Refills: 0 | Status: DISCONTINUED | OUTPATIENT
Start: 2023-04-07 | End: 2023-04-19

## 2023-04-07 RX ORDER — SENNA PLUS 8.6 MG/1
2 TABLET ORAL AT BEDTIME
Refills: 0 | Status: DISCONTINUED | OUTPATIENT
Start: 2023-04-07 | End: 2023-04-13

## 2023-04-07 RX ORDER — POLYETHYLENE GLYCOL 3350 17 G/17G
17 POWDER, FOR SOLUTION ORAL DAILY
Refills: 0 | Status: DISCONTINUED | OUTPATIENT
Start: 2023-04-07 | End: 2023-04-13

## 2023-04-07 RX ADMIN — APIXABAN 5 MILLIGRAM(S): 2.5 TABLET, FILM COATED ORAL at 05:33

## 2023-04-07 RX ADMIN — PANTOPRAZOLE SODIUM 40 MILLIGRAM(S): 20 TABLET, DELAYED RELEASE ORAL at 17:21

## 2023-04-07 RX ADMIN — SENNA PLUS 2 TABLET(S): 8.6 TABLET ORAL at 21:44

## 2023-04-07 RX ADMIN — Medication 3 UNIT(S): at 17:21

## 2023-04-07 RX ADMIN — APIXABAN 5 MILLIGRAM(S): 2.5 TABLET, FILM COATED ORAL at 17:21

## 2023-04-07 RX ADMIN — PANTOPRAZOLE SODIUM 40 MILLIGRAM(S): 20 TABLET, DELAYED RELEASE ORAL at 05:33

## 2023-04-07 RX ADMIN — LISINOPRIL 10 MILLIGRAM(S): 2.5 TABLET ORAL at 05:33

## 2023-04-07 RX ADMIN — Medication 5 MILLIGRAM(S): at 21:44

## 2023-04-07 RX ADMIN — CHLORHEXIDINE GLUCONATE 1 APPLICATION(S): 213 SOLUTION TOPICAL at 05:34

## 2023-04-07 RX ADMIN — Medication 1 GRAM(S): at 17:21

## 2023-04-07 RX ADMIN — Medication 650 MILLIGRAM(S): at 19:53

## 2023-04-07 RX ADMIN — Medication 1 GRAM(S): at 05:33

## 2023-04-07 RX ADMIN — Medication 650 MILLIGRAM(S): at 20:23

## 2023-04-07 RX ADMIN — Medication 3 UNIT(S): at 11:55

## 2023-04-07 RX ADMIN — BUDESONIDE AND FORMOTEROL FUMARATE DIHYDRATE 2 PUFF(S): 160; 4.5 AEROSOL RESPIRATORY (INHALATION) at 19:53

## 2023-04-07 RX ADMIN — Medication 20 MILLIGRAM(S): at 05:33

## 2023-04-07 RX ADMIN — INSULIN GLARGINE 10 UNIT(S): 100 INJECTION, SOLUTION SUBCUTANEOUS at 08:13

## 2023-04-07 RX ADMIN — AMLODIPINE BESYLATE 2.5 MILLIGRAM(S): 2.5 TABLET ORAL at 05:33

## 2023-04-07 RX ADMIN — CEFEPIME 100 MILLIGRAM(S): 1 INJECTION, POWDER, FOR SOLUTION INTRAMUSCULAR; INTRAVENOUS at 08:13

## 2023-04-07 RX ADMIN — ATORVASTATIN CALCIUM 20 MILLIGRAM(S): 80 TABLET, FILM COATED ORAL at 21:44

## 2023-04-07 RX ADMIN — Medication 3 UNIT(S): at 08:14

## 2023-04-07 RX ADMIN — Medication 2: at 17:21

## 2023-04-07 NOTE — PROGRESS NOTE ADULT - SUBJECTIVE AND OBJECTIVE BOX
RAUL LYNN  89y Female    CHIEF COMPLAINT:    Patient is a 89y old  Female who presents with a chief complaint of SOB (2023 09:52)    INTERVAL HPI/OVERNIGHT EVENTS:    Patient seen and examined. No acute events overnight. Comfortable on 2L NC, BP better     ROS: All other systems are negative.    Vital Signs:    T(F): 97.4 (23 @ 13:30), Max: 97.4 (23 @ 04:00)  HR: 98 (23 @ 13:30) (91 - 110)  BP: 119/68 (23 @ 13:30) (110/82 - 169/84)  RR: 20 (23 @ 13:30) (19 - 20)  SpO2: 99% (23 @ 13:30) (94% - 100%)    2023 07:01  -  2023 07:00  --------------------------------------------------------  IN: 210 mL / OUT: 1400 mL / NET: -1190 mL    Daily Weight in k.2 (2023 00:00)    POCT Blood Glucose.: 147 mg/dL (2023 11:49)  POCT Blood Glucose.: 139 mg/dL (2023 07:30)  POCT Blood Glucose.: 322 mg/dL (2023 21:12)  POCT Blood Glucose.: 237 mg/dL (2023 16:57)    PHYSICAL EXAM:    GENERAL:  NAD chronically ill appearing   SKIN: No rashes or lesions  HEENT: Atraumatic. Normocephalic.   NECK: Supple, No JVD. No lymphadenopathy.  PULMONARY: decreased breath sounds B/L. No wheezing.    CVS: Normal S1, S2. Rate and Rhythm are regular.   ABDOMEN/GI: Soft, Nontender, Nondistended.  MSK:  No clubbing or cyanosis   NEUROLOGIC: moves all extrmeities   PSYCH: Awake and alert     Consultant(s) Notes Reviewed:  [x ] YES  [ ] NO  Care Discussed with Consultants/Other Providers [ x] YES  [ ] NO    LABS:                        8.4    11.14 )-----------( 492      ( 2023 01:16 )             26.6     134<L>  |  98  |  50<H>  ----------------------------<  143<H>  4.9   |  25  |  1.7<H>    Ca    9.1      2023 01:16  Mg     2.0     04-    TPro  6.0  /  Alb  2.7<L>  /  TBili  0.3  /  DBili  x   /  AST  11  /  ALT  8   /  AlkPhos  114      RADIOLOGY & ADDITIONAL TESTS:  Imaging or report Personally Reviewed:  [x] YES  [ ] NO  EKG reviewed: [x] YES  [ ] NO    Medications:  Standing  albuterol/ipratropium for Nebulization 3 milliLiter(s) Nebulizer every 12 hours  amLODIPine   Tablet 2.5 milliGRAM(s) Oral daily  apixaban 5 milliGRAM(s) Oral every 12 hours  atorvastatin 20 milliGRAM(s) Oral at bedtime  budesonide  80 MICROgram(s)/formoterol 4.5 MICROgram(s) Inhaler 2 Puff(s) Inhalation two times a day  cefepime   IVPB      cefepime   IVPB 1000 milliGRAM(s) IV Intermittent every 24 hours  chlorhexidine 2% Cloths 1 Application(s) Topical <User Schedule>  dextrose 5%. 1000 milliLiter(s) IV Continuous <Continuous>  dextrose 5%. 1000 milliLiter(s) IV Continuous <Continuous>  dextrose 50% Injectable 25 Gram(s) IV Push once  dextrose 50% Injectable 12.5 Gram(s) IV Push once  dextrose 50% Injectable 25 Gram(s) IV Push once  glucagon  Injectable 1 milliGRAM(s) IntraMuscular once  insulin glargine Injectable (LANTUS) 10 Unit(s) SubCutaneous every morning  insulin lispro (ADMELOG) corrective regimen sliding scale   SubCutaneous three times a day before meals  insulin lispro (ADMELOG) corrective regimen sliding scale   SubCutaneous at bedtime  insulin lispro Injectable (ADMELOG) 3 Unit(s) SubCutaneous three times a day before meals  levoFLOXacin IVPB 500 milliGRAM(s) IV Intermittent every 24 hours  lisinopril 10 milliGRAM(s) Oral daily  melatonin 5 milliGRAM(s) Oral at bedtime  pantoprazole    Tablet 40 milliGRAM(s) Oral two times a day  predniSONE   Tablet 20 milliGRAM(s) Oral daily  senna 2 Tablet(s) Oral at bedtime  sucralfate 1 Gram(s) Oral two times a day    PRN Meds  albuterol/ipratropium for Nebulization 3 milliLiter(s) Nebulizer every 4 hours PRN  aluminum hydroxide/magnesium hydroxide/simethicone Suspension 30 milliLiter(s) Oral every 4 hours PRN  dextrose Oral Gel 15 Gram(s) Oral once PRN  polyethylene glycol 3350 17 Gram(s) Oral daily PRN

## 2023-04-07 NOTE — PROGRESS NOTE ADULT - SUBJECTIVE AND OBJECTIVE BOX
Patient is a 89y old  Female who presents with a chief complaint of SOB (07 Apr 2023 08:54)      INTERVAL HPI/OVERNIGHT EVENTS:   No overnight events   Afebrile, hemodynamically stable     On 3L NC this AM    ICU Vital Signs Last 24 Hrs  T(C): 36.2 (07 Apr 2023 07:43), Max: 36.6 (06 Apr 2023 11:34)  T(F): 97.1 (07 Apr 2023 07:43), Max: 97.9 (06 Apr 2023 11:34)  HR: 92 (07 Apr 2023 07:43) (91 - 110)  BP: 169/84 (07 Apr 2023 07:43) (110/62 - 169/84)  BP(mean): 119 (07 Apr 2023 07:43) (79 - 119)  ABP: --  ABP(mean): --  RR: 20 (07 Apr 2023 07:43) (19 - 20)  SpO2: 97% (07 Apr 2023 07:43) (94% - 99%)    O2 Parameters below as of 07 Apr 2023 07:43  Patient On (Oxygen Delivery Method): nasal cannula          I&O's Summary    06 Apr 2023 07:01  -  07 Apr 2023 07:00  --------------------------------------------------------  IN: 210 mL / OUT: 1400 mL / NET: -1190 mL          LABS:                        8.4    11.14 )-----------( 492      ( 07 Apr 2023 01:16 )             26.6     04-07    134<L>  |  98  |  50<H>  ----------------------------<  143<H>  4.9   |  25  |  1.7<H>    Ca    9.1      07 Apr 2023 01:16  Mg     2.0     04-07    TPro  6.0  /  Alb  2.7<L>  /  TBili  0.3  /  DBili  x   /  AST  11  /  ALT  8   /  AlkPhos  114  04-07        CAPILLARY BLOOD GLUCOSE      POCT Blood Glucose.: 139 mg/dL (07 Apr 2023 07:30)  POCT Blood Glucose.: 322 mg/dL (06 Apr 2023 21:12)  POCT Blood Glucose.: 237 mg/dL (06 Apr 2023 16:57)  POCT Blood Glucose.: 206 mg/dL (06 Apr 2023 11:23)    ABG - ( 05 Apr 2023 10:43 )  pH, Arterial: 7.45  pH, Blood: x     /  pCO2: 42    /  pO2: 87    / HCO3: 29    / Base Excess: 4.7   /  SaO2: 98.7                RADIOLOGY & ADDITIONAL TESTS:    Consultant(s) Notes Reviewed:  [x ] YES  [ ] NO    MEDICATIONS  (STANDING):  albuterol/ipratropium for Nebulization 3 milliLiter(s) Nebulizer every 12 hours  amLODIPine   Tablet 2.5 milliGRAM(s) Oral daily  apixaban 5 milliGRAM(s) Oral every 12 hours  atorvastatin 20 milliGRAM(s) Oral at bedtime  budesonide  80 MICROgram(s)/formoterol 4.5 MICROgram(s) Inhaler 2 Puff(s) Inhalation two times a day  cefepime   IVPB      cefepime   IVPB 1000 milliGRAM(s) IV Intermittent every 24 hours  chlorhexidine 2% Cloths 1 Application(s) Topical <User Schedule>  dextrose 5%. 1000 milliLiter(s) (50 mL/Hr) IV Continuous <Continuous>  dextrose 5%. 1000 milliLiter(s) (100 mL/Hr) IV Continuous <Continuous>  dextrose 50% Injectable 25 Gram(s) IV Push once  dextrose 50% Injectable 12.5 Gram(s) IV Push once  dextrose 50% Injectable 25 Gram(s) IV Push once  glucagon  Injectable 1 milliGRAM(s) IntraMuscular once  insulin glargine Injectable (LANTUS) 10 Unit(s) SubCutaneous every morning  insulin lispro (ADMELOG) corrective regimen sliding scale   SubCutaneous three times a day before meals  insulin lispro (ADMELOG) corrective regimen sliding scale   SubCutaneous at bedtime  insulin lispro Injectable (ADMELOG) 3 Unit(s) SubCutaneous three times a day before meals  levoFLOXacin IVPB 500 milliGRAM(s) IV Intermittent every 24 hours  lisinopril 10 milliGRAM(s) Oral daily  melatonin 5 milliGRAM(s) Oral at bedtime  pantoprazole    Tablet 40 milliGRAM(s) Oral two times a day  predniSONE   Tablet 20 milliGRAM(s) Oral daily  senna 2 Tablet(s) Oral at bedtime  sucralfate 1 Gram(s) Oral two times a day    MEDICATIONS  (PRN):  albuterol/ipratropium for Nebulization 3 milliLiter(s) Nebulizer every 4 hours PRN Shortness of Breath and/or Wheezing  aluminum hydroxide/magnesium hydroxide/simethicone Suspension 30 milliLiter(s) Oral every 4 hours PRN Dyspepsia  dextrose Oral Gel 15 Gram(s) Oral once PRN Blood Glucose LESS THAN 70 milliGRAM(s)/deciliter  polyethylene glycol 3350 17 Gram(s) Oral daily PRN Constipation      PHYSICAL EXAM:  GENERAL: NAD  NERVOUS SYSTEM:  Alert & Awake.   CHEST/LUNG: B/L good air entry; No rales, rhonchi, or wheezing  HEART: S1S2 normal, no S3, Regular rate and rhythm; No murmurs  ABDOMEN: Soft, Nontender, Nondistended; Bowel sounds present  EXTREMITIES:  2+ Peripheral Pulses, No clubbing, cyanosis, or edema      Care Discussed with Consultants/Other Providers [ x] YES  [ ] NO

## 2023-04-07 NOTE — PROGRESS NOTE ADULT - SUBJECTIVE AND OBJECTIVE BOX
Over Night Events:  On 3L NC with a POx of 98%    PHYSICAL EXAM    ICU Vital Signs Last 24 Hrs  T(C): 36.2 (07 Apr 2023 07:43), Max: 36.6 (06 Apr 2023 11:34)  T(F): 97.1 (07 Apr 2023 07:43), Max: 97.9 (06 Apr 2023 11:34)  HR: 92 (07 Apr 2023 07:43) (91 - 110)  BP: 169/84 (07 Apr 2023 07:43) (110/62 - 169/84)  BP(mean): 119 (07 Apr 2023 07:43) (79 - 119)  RR: 20 (07 Apr 2023 07:43) (19 - 20)  SpO2: 97% (07 Apr 2023 07:43) (94% - 99%)    O2 Parameters below as of 07 Apr 2023 07:43  Patient On (Oxygen Delivery Method): nasal cannula      General: ill appearing  HEENT:  TREVOR              Lungs: decreased BS bilaterally, bilateral crackles  Cardiovascular: Regular  Abdomen: Soft, Positive BS  Extremities: No clubbing  Skin: Warm  Neurological: Non focal       04-06-23 @ 07:01  -  04-07-23 @ 07:00  --------------------------------------------------------  IN:    Oral Fluid: 210 mL  Total IN: 210 mL    OUT:    Voided (mL): 1400 mL  Total OUT: 1400 mL    Total NET: -1190 mL      LABS:                          8.4    11.14 )-----------( 492      ( 07 Apr 2023 01:16 )             26.6   134<L>  |  98  |  50<H>  ----------------------------<  143<H>  4.9   |  25  |  1.7<H>    Ca    9.1      07 Apr 2023 01:16  Mg     2.0     04-07    TPro  6.0  /  Alb  2.7<L>  /  TBili  0.3  /  DBili  x   /  AST  11  /  ALT  8   /  AlkPhos  114  04-07     LIVER FUNCTIONS - ( 07 Apr 2023 01:16 )  Alb: 2.7 g/dL / Pro: 6.0 g/dL / ALK PHOS: 114 U/L / ALT: 8 U/L / AST: 11 U/L / GGT: x                                              ABG - ( 05 Apr 2023 10:43 )  pH, Arterial: 7.45  pH, Blood: x     /  pCO2: 42    /  pO2: 87    / HCO3: 29    / Base Excess: 4.7   /  SaO2: 98.7        MEDICATIONS  (STANDING):  albuterol/ipratropium for Nebulization 3 milliLiter(s) Nebulizer every 12 hours  amLODIPine   Tablet 2.5 milliGRAM(s) Oral daily  apixaban 5 milliGRAM(s) Oral every 12 hours  atorvastatin 20 milliGRAM(s) Oral at bedtime  budesonide  80 MICROgram(s)/formoterol 4.5 MICROgram(s) Inhaler 2 Puff(s) Inhalation two times a day  cefepime   IVPB      cefepime   IVPB 1000 milliGRAM(s) IV Intermittent every 24 hours  chlorhexidine 2% Cloths 1 Application(s) Topical <User Schedule>  dextrose 5%. 1000 milliLiter(s) (50 mL/Hr) IV Continuous <Continuous>  dextrose 5%. 1000 milliLiter(s) (100 mL/Hr) IV Continuous <Continuous>  dextrose 50% Injectable 25 Gram(s) IV Push once  dextrose 50% Injectable 12.5 Gram(s) IV Push once  dextrose 50% Injectable 25 Gram(s) IV Push once  glucagon  Injectable 1 milliGRAM(s) IntraMuscular once  insulin glargine Injectable (LANTUS) 10 Unit(s) SubCutaneous every morning  insulin lispro (ADMELOG) corrective regimen sliding scale   SubCutaneous three times a day before meals  insulin lispro (ADMELOG) corrective regimen sliding scale   SubCutaneous at bedtime  insulin lispro Injectable (ADMELOG) 3 Unit(s) SubCutaneous three times a day before meals  levoFLOXacin IVPB 500 milliGRAM(s) IV Intermittent every 24 hours  lisinopril 10 milliGRAM(s) Oral daily  melatonin 5 milliGRAM(s) Oral at bedtime  pantoprazole    Tablet 40 milliGRAM(s) Oral two times a day  predniSONE   Tablet 20 milliGRAM(s) Oral daily  senna 2 Tablet(s) Oral at bedtime  sucralfate 1 Gram(s) Oral two times a day    MEDICATIONS  (PRN):  albuterol/ipratropium for Nebulization 3 milliLiter(s) Nebulizer every 4 hours PRN Shortness of Breath and/or Wheezing  aluminum hydroxide/magnesium hydroxide/simethicone Suspension 30 milliLiter(s) Oral every 4 hours PRN Dyspepsia  dextrose Oral Gel 15 Gram(s) Oral once PRN Blood Glucose LESS THAN 70 milliGRAM(s)/deciliter  polyethylene glycol 3350 17 Gram(s) Oral daily PRN Constipation     Over Night Events:  On 3L NC with a POx of 98%, afebrile, CXR noted    PHYSICAL EXAM    ICU Vital Signs Last 24 Hrs  T(C): 36.2 (07 Apr 2023 07:43), Max: 36.6 (06 Apr 2023 11:34)  T(F): 97.1 (07 Apr 2023 07:43), Max: 97.9 (06 Apr 2023 11:34)  HR: 92 (07 Apr 2023 07:43) (91 - 110)  BP: 169/84 (07 Apr 2023 07:43) (110/62 - 169/84)  BP(mean): 119 (07 Apr 2023 07:43) (79 - 119)  RR: 20 (07 Apr 2023 07:43) (19 - 20)  SpO2: 97% (07 Apr 2023 07:43) (94% - 99%)    O2 Parameters below as of 07 Apr 2023 07:43  Patient On (Oxygen Delivery Method): nasal cannula      General: ill appearing  HEENT:  TREVOR              Lungs: decreased BS bilaterally, bilateral crackles  Cardiovascular: Regular  Abdomen: Soft, Positive BS  Extremities: No clubbing  Neurological: Non focal       04-06-23 @ 07:01  -  04-07-23 @ 07:00  --------------------------------------------------------  IN:    Oral Fluid: 210 mL  Total IN: 210 mL    OUT:    Voided (mL): 1400 mL  Total OUT: 1400 mL    Total NET: -1190 mL      LABS:                          8.4    11.14 )-----------( 492      ( 07 Apr 2023 01:16 )             26.6   134<L>  |  98  |  50<H>  ----------------------------<  143<H>  4.9   |  25  |  1.7<H>    Ca    9.1      07 Apr 2023 01:16  Mg     2.0     04-07    TPro  6.0  /  Alb  2.7<L>  /  TBili  0.3  /  DBili  x   /  AST  11  /  ALT  8   /  AlkPhos  114  04-07     LIVER FUNCTIONS - ( 07 Apr 2023 01:16 )  Alb: 2.7 g/dL / Pro: 6.0 g/dL / ALK PHOS: 114 U/L / ALT: 8 U/L / AST: 11 U/L / GGT: x                                              ABG - ( 05 Apr 2023 10:43 )  pH, Arterial: 7.45  pH, Blood: x     /  pCO2: 42    /  pO2: 87    / HCO3: 29    / Base Excess: 4.7   /  SaO2: 98.7        MEDICATIONS  (STANDING):  albuterol/ipratropium for Nebulization 3 milliLiter(s) Nebulizer every 12 hours  amLODIPine   Tablet 2.5 milliGRAM(s) Oral daily  apixaban 5 milliGRAM(s) Oral every 12 hours  atorvastatin 20 milliGRAM(s) Oral at bedtime  budesonide  80 MICROgram(s)/formoterol 4.5 MICROgram(s) Inhaler 2 Puff(s) Inhalation two times a day  cefepime   IVPB      cefepime   IVPB 1000 milliGRAM(s) IV Intermittent every 24 hours  chlorhexidine 2% Cloths 1 Application(s) Topical <User Schedule>  dextrose 5%. 1000 milliLiter(s) (50 mL/Hr) IV Continuous <Continuous>  dextrose 5%. 1000 milliLiter(s) (100 mL/Hr) IV Continuous <Continuous>  dextrose 50% Injectable 25 Gram(s) IV Push once  dextrose 50% Injectable 12.5 Gram(s) IV Push once  dextrose 50% Injectable 25 Gram(s) IV Push once  glucagon  Injectable 1 milliGRAM(s) IntraMuscular once  insulin glargine Injectable (LANTUS) 10 Unit(s) SubCutaneous every morning  insulin lispro (ADMELOG) corrective regimen sliding scale   SubCutaneous three times a day before meals  insulin lispro (ADMELOG) corrective regimen sliding scale   SubCutaneous at bedtime  insulin lispro Injectable (ADMELOG) 3 Unit(s) SubCutaneous three times a day before meals  levoFLOXacin IVPB 500 milliGRAM(s) IV Intermittent every 24 hours  lisinopril 10 milliGRAM(s) Oral daily  melatonin 5 milliGRAM(s) Oral at bedtime  pantoprazole    Tablet 40 milliGRAM(s) Oral two times a day  predniSONE   Tablet 20 milliGRAM(s) Oral daily  senna 2 Tablet(s) Oral at bedtime  sucralfate 1 Gram(s) Oral two times a day    MEDICATIONS  (PRN):  albuterol/ipratropium for Nebulization 3 milliLiter(s) Nebulizer every 4 hours PRN Shortness of Breath and/or Wheezing  aluminum hydroxide/magnesium hydroxide/simethicone Suspension 30 milliLiter(s) Oral every 4 hours PRN Dyspepsia  dextrose Oral Gel 15 Gram(s) Oral once PRN Blood Glucose LESS THAN 70 milliGRAM(s)/deciliter  polyethylene glycol 3350 17 Gram(s) Oral daily PRN Constipation

## 2023-04-07 NOTE — PROGRESS NOTE ADULT - ASSESSMENT
89 year old female withPMHx of HTN, DM, CKD, active smoker, COPD not on home O2 recent admission for aspiration PNA with incidental finding of distal DVT presented to the ED for hypoxia    Acute Hypoxic respiratory failure Likely secondary to Aspiration PNA/Bilateral with sepsis POA  COPD not on home 02  Active smoker   Recent Distal DVT  currently on 2L NC, improving  RVP negative strep, legionella, MRSA, procal 0.14   duplex-> dvt in left gastrocnemius   ct angio- neg for PE   On cefepime and LEvaquin, f/u pending cultures, plan for 7 day course   continue with prednisone taper  echo ef 55%, GIDD, mod MS, mild AS   diet per speech and swallow   - PT    LLE distal DVT   - Duplex (3/24/23) noted thrombus in left gastrocnemius and peroneal veins  - repeat duplex unchanged  - started Eliquis     hyponatremia - improved  134 today, ecourage PO hydration  HCTZ on hold    Dyspepsia  Peptic Ulcer Disease  - continue with protonix BID, and Carafate     CKD III  - Cr  1.4 on admission, slowly uptrending 1.7  - was following with Nephrology in [place]  - encourage PO intake, hold lisinopril, monitor I&Os    HTN  - was on amlodipine 2.5 mg daily, HCTZ 12.5mg daily and lisinopril 10mg daily in assisted living   - holding HCTZ and lisinopril     DM  - takes pioglitazone 15mg daily  - c/w insulin lantus 10U at bedtime, lispro 3U TID ; monitor FS  - A1c 7.4% (3/2023)    DNR/DNI    Pending: labs, IV abx, pt

## 2023-04-08 LAB
ALBUMIN SERPL ELPH-MCNC: 2.6 G/DL — LOW (ref 3.5–5.2)
ALP SERPL-CCNC: 122 U/L — HIGH (ref 30–115)
ALT FLD-CCNC: 11 U/L — SIGNIFICANT CHANGE UP (ref 0–41)
ANION GAP SERPL CALC-SCNC: 10 MMOL/L — SIGNIFICANT CHANGE UP (ref 7–14)
AST SERPL-CCNC: 18 U/L — SIGNIFICANT CHANGE UP (ref 0–41)
BASOPHILS # BLD AUTO: 0.04 K/UL — SIGNIFICANT CHANGE UP (ref 0–0.2)
BASOPHILS NFR BLD AUTO: 0.4 % — SIGNIFICANT CHANGE UP (ref 0–1)
BILIRUB SERPL-MCNC: <0.2 MG/DL — SIGNIFICANT CHANGE UP (ref 0.2–1.2)
BUN SERPL-MCNC: 56 MG/DL — HIGH (ref 10–20)
CALCIUM SERPL-MCNC: 9.4 MG/DL — SIGNIFICANT CHANGE UP (ref 8.4–10.5)
CHLORIDE SERPL-SCNC: 100 MMOL/L — SIGNIFICANT CHANGE UP (ref 98–110)
CO2 SERPL-SCNC: 23 MMOL/L — SIGNIFICANT CHANGE UP (ref 17–32)
CREAT SERPL-MCNC: 1.7 MG/DL — HIGH (ref 0.7–1.5)
CULTURE RESULTS: SIGNIFICANT CHANGE UP
CULTURE RESULTS: SIGNIFICANT CHANGE UP
EGFR: 28 ML/MIN/1.73M2 — LOW
EOSINOPHIL # BLD AUTO: 0.04 K/UL — SIGNIFICANT CHANGE UP (ref 0–0.7)
EOSINOPHIL NFR BLD AUTO: 0.4 % — SIGNIFICANT CHANGE UP (ref 0–8)
GLUCOSE BLDC GLUCOMTR-MCNC: 125 MG/DL — HIGH (ref 70–99)
GLUCOSE BLDC GLUCOMTR-MCNC: 131 MG/DL — HIGH (ref 70–99)
GLUCOSE BLDC GLUCOMTR-MCNC: 134 MG/DL — HIGH (ref 70–99)
GLUCOSE BLDC GLUCOMTR-MCNC: 311 MG/DL — HIGH (ref 70–99)
GLUCOSE SERPL-MCNC: 133 MG/DL — HIGH (ref 70–99)
HCT VFR BLD CALC: 26.3 % — LOW (ref 37–47)
HGB BLD-MCNC: 8.4 G/DL — LOW (ref 12–16)
IMM GRANULOCYTES NFR BLD AUTO: 2.9 % — HIGH (ref 0.1–0.3)
LYMPHOCYTES # BLD AUTO: 2.25 K/UL — SIGNIFICANT CHANGE UP (ref 1.2–3.4)
LYMPHOCYTES # BLD AUTO: 23.9 % — SIGNIFICANT CHANGE UP (ref 20.5–51.1)
MAGNESIUM SERPL-MCNC: 1.8 MG/DL — SIGNIFICANT CHANGE UP (ref 1.8–2.4)
MCHC RBC-ENTMCNC: 28.6 PG — SIGNIFICANT CHANGE UP (ref 27–31)
MCHC RBC-ENTMCNC: 31.9 G/DL — LOW (ref 32–37)
MCV RBC AUTO: 89.5 FL — SIGNIFICANT CHANGE UP (ref 81–99)
MONOCYTES # BLD AUTO: 0.73 K/UL — HIGH (ref 0.1–0.6)
MONOCYTES NFR BLD AUTO: 7.7 % — SIGNIFICANT CHANGE UP (ref 1.7–9.3)
NEUTROPHILS # BLD AUTO: 6.09 K/UL — SIGNIFICANT CHANGE UP (ref 1.4–6.5)
NEUTROPHILS NFR BLD AUTO: 64.7 % — SIGNIFICANT CHANGE UP (ref 42.2–75.2)
NRBC # BLD: 0 /100 WBCS — SIGNIFICANT CHANGE UP (ref 0–0)
PLATELET # BLD AUTO: 543 K/UL — HIGH (ref 130–400)
POTASSIUM SERPL-MCNC: 5.2 MMOL/L — HIGH (ref 3.5–5)
POTASSIUM SERPL-SCNC: 5.2 MMOL/L — HIGH (ref 3.5–5)
PROT SERPL-MCNC: 5.9 G/DL — LOW (ref 6–8)
RBC # BLD: 2.94 M/UL — LOW (ref 4.2–5.4)
RBC # FLD: 16.6 % — HIGH (ref 11.5–14.5)
SODIUM SERPL-SCNC: 133 MMOL/L — LOW (ref 135–146)
SPECIMEN SOURCE: SIGNIFICANT CHANGE UP
SPECIMEN SOURCE: SIGNIFICANT CHANGE UP
WBC # BLD: 9.42 K/UL — SIGNIFICANT CHANGE UP (ref 4.8–10.8)
WBC # FLD AUTO: 9.42 K/UL — SIGNIFICANT CHANGE UP (ref 4.8–10.8)

## 2023-04-08 PROCEDURE — 99232 SBSQ HOSP IP/OBS MODERATE 35: CPT

## 2023-04-08 RX ORDER — LISINOPRIL 2.5 MG/1
1 TABLET ORAL
Refills: 0 | DISCHARGE

## 2023-04-08 RX ORDER — HYDROCHLOROTHIAZIDE 25 MG
1 TABLET ORAL
Refills: 0 | DISCHARGE

## 2023-04-08 RX ADMIN — Medication 3 UNIT(S): at 08:29

## 2023-04-08 RX ADMIN — APIXABAN 5 MILLIGRAM(S): 2.5 TABLET, FILM COATED ORAL at 05:35

## 2023-04-08 RX ADMIN — PANTOPRAZOLE SODIUM 40 MILLIGRAM(S): 20 TABLET, DELAYED RELEASE ORAL at 17:23

## 2023-04-08 RX ADMIN — AMLODIPINE BESYLATE 2.5 MILLIGRAM(S): 2.5 TABLET ORAL at 05:35

## 2023-04-08 RX ADMIN — Medication 3 UNIT(S): at 12:44

## 2023-04-08 RX ADMIN — BUDESONIDE AND FORMOTEROL FUMARATE DIHYDRATE 2 PUFF(S): 160; 4.5 AEROSOL RESPIRATORY (INHALATION) at 21:32

## 2023-04-08 RX ADMIN — Medication 650 MILLIGRAM(S): at 21:26

## 2023-04-08 RX ADMIN — ATORVASTATIN CALCIUM 20 MILLIGRAM(S): 80 TABLET, FILM COATED ORAL at 21:26

## 2023-04-08 RX ADMIN — SENNA PLUS 2 TABLET(S): 8.6 TABLET ORAL at 21:27

## 2023-04-08 RX ADMIN — Medication 3 MILLILITER(S): at 09:27

## 2023-04-08 RX ADMIN — INSULIN GLARGINE 10 UNIT(S): 100 INJECTION, SOLUTION SUBCUTANEOUS at 08:29

## 2023-04-08 RX ADMIN — Medication 1 GRAM(S): at 17:23

## 2023-04-08 RX ADMIN — Medication 20 MILLIGRAM(S): at 05:35

## 2023-04-08 RX ADMIN — Medication 4: at 12:44

## 2023-04-08 RX ADMIN — APIXABAN 5 MILLIGRAM(S): 2.5 TABLET, FILM COATED ORAL at 17:23

## 2023-04-08 RX ADMIN — Medication 5 MILLIGRAM(S): at 21:26

## 2023-04-08 RX ADMIN — Medication 1 GRAM(S): at 05:35

## 2023-04-08 RX ADMIN — Medication 650 MILLIGRAM(S): at 22:26

## 2023-04-08 RX ADMIN — Medication 3 MILLILITER(S): at 20:17

## 2023-04-08 RX ADMIN — BUDESONIDE AND FORMOTEROL FUMARATE DIHYDRATE 2 PUFF(S): 160; 4.5 AEROSOL RESPIRATORY (INHALATION) at 08:22

## 2023-04-08 RX ADMIN — Medication 3 UNIT(S): at 17:19

## 2023-04-08 RX ADMIN — PANTOPRAZOLE SODIUM 40 MILLIGRAM(S): 20 TABLET, DELAYED RELEASE ORAL at 05:35

## 2023-04-08 RX ADMIN — CHLORHEXIDINE GLUCONATE 1 APPLICATION(S): 213 SOLUTION TOPICAL at 05:36

## 2023-04-08 RX ADMIN — CEFEPIME 100 MILLIGRAM(S): 1 INJECTION, POWDER, FOR SOLUTION INTRAMUSCULAR; INTRAVENOUS at 10:48

## 2023-04-08 RX ADMIN — Medication 30 MILLILITER(S): at 21:34

## 2023-04-08 RX ADMIN — Medication 30 MILLILITER(S): at 11:16

## 2023-04-08 NOTE — PROGRESS NOTE ADULT - SUBJECTIVE AND OBJECTIVE BOX
RAUL LYNN 89y Female  MRN#: [id number]   Hospital Day: 5d    SUBJECTIVE  Patient is a 89y old Female who presents with a chief complaint of SOB (08 Apr 2023 11:48)  Currently admitted to medicine with the primary diagnosis of Pneumonia      INTERVAL HPI AND OVERNIGHT EVENTS:  Patient was examined and seen at bedside. This morning she is resting comfortably in bed and reports no issues or overnight events.    OBJECTIVE  PAST MEDICAL & SURGICAL HISTORY  Hypertension    Diabetes mellitus    Chronic kidney disease, unspecified CKD stage      ALLERGIES:  penicillin (Unknown)    MEDICATIONS:  STANDING MEDICATIONS  albuterol/ipratropium for Nebulization 3 milliLiter(s) Nebulizer every 12 hours  amLODIPine   Tablet 2.5 milliGRAM(s) Oral daily  apixaban 5 milliGRAM(s) Oral every 12 hours  atorvastatin 20 milliGRAM(s) Oral at bedtime  budesonide  80 MICROgram(s)/formoterol 4.5 MICROgram(s) Inhaler 2 Puff(s) Inhalation two times a day  cefepime   IVPB      cefepime   IVPB 1000 milliGRAM(s) IV Intermittent every 24 hours  chlorhexidine 2% Cloths 1 Application(s) Topical <User Schedule>  dextrose 5%. 1000 milliLiter(s) IV Continuous <Continuous>  dextrose 5%. 1000 milliLiter(s) IV Continuous <Continuous>  dextrose 50% Injectable 25 Gram(s) IV Push once  dextrose 50% Injectable 12.5 Gram(s) IV Push once  dextrose 50% Injectable 25 Gram(s) IV Push once  glucagon  Injectable 1 milliGRAM(s) IntraMuscular once  insulin glargine Injectable (LANTUS) 10 Unit(s) SubCutaneous every morning  insulin lispro (ADMELOG) corrective regimen sliding scale   SubCutaneous three times a day before meals  insulin lispro (ADMELOG) corrective regimen sliding scale   SubCutaneous at bedtime  insulin lispro Injectable (ADMELOG) 3 Unit(s) SubCutaneous three times a day before meals  levoFLOXacin IVPB 500 milliGRAM(s) IV Intermittent every 24 hours  melatonin 5 milliGRAM(s) Oral at bedtime  pantoprazole    Tablet 40 milliGRAM(s) Oral two times a day  predniSONE   Tablet 20 milliGRAM(s) Oral daily  senna 2 Tablet(s) Oral at bedtime  sucralfate 1 Gram(s) Oral two times a day    PRN MEDICATIONS  acetaminophen     Tablet .. 650 milliGRAM(s) Oral every 6 hours PRN  albuterol/ipratropium for Nebulization 3 milliLiter(s) Nebulizer every 4 hours PRN  aluminum hydroxide/magnesium hydroxide/simethicone Suspension 30 milliLiter(s) Oral every 4 hours PRN  dextrose Oral Gel 15 Gram(s) Oral once PRN  polyethylene glycol 3350 17 Gram(s) Oral daily PRN      VITAL SIGNS: Last 24 Hours  T(C): 35.9 (08 Apr 2023 12:49), Max: 36.8 (07 Apr 2023 21:00)  T(F): 96.6 (08 Apr 2023 12:49), Max: 98.2 (07 Apr 2023 21:00)  HR: 104 (08 Apr 2023 12:49) (83 - 104)  BP: 128/60 (08 Apr 2023 12:49) (128/60 - 142/72)  BP(mean): --  RR: 19 (08 Apr 2023 12:49) (18 - 20)  SpO2: 97% (08 Apr 2023 12:49) (97% - 100%)    LABS:                        8.4    9.42  )-----------( 543      ( 08 Apr 2023 01:22 )             26.3     04-08    133<L>  |  100  |  56<H>  ----------------------------<  133<H>  5.2<H>   |  23  |  1.7<H>    Ca    9.4      08 Apr 2023 01:22  Mg     1.8     04-08    TPro  5.9<L>  /  Alb  2.6<L>  /  TBili  <0.2  /  DBili  x   /  AST  18  /  ALT  11  /  AlkPhos  122<H>  04-08                  RADIOLOGY:      PHYSICAL EXAM:  CONSTITUTIONAL: No acute distress, AAOx3  HEAD: Atraumatic, normocephalic  EYES: EOM intact, PERRLA, conjunctiva and sclera clear  ENT: moist mucous membranes  PULMONARY: Clear to auscultation bilaterally  CARDIOVASCULAR: Regular rate and rhythm  GASTROINTESTINAL: Soft, non-tender, non-distended; bowel sounds present  MUSCULOSKELETAL: no edema  NEUROLOGY: non-focal  SKIN: warm and dry

## 2023-04-08 NOTE — PROGRESS NOTE ADULT - ASSESSMENT
89 year old female withPMHx of HTN, DM, CKD, active smoker, COPD not on home O2 recent admission for aspiration PNA with incidental finding of distal DVT presented to the ED for hypoxia    #Acute Hypoxic Respiratory Failure likely secondary to aspiration PNA vs COPD exacerbation  #Active Smoker   #COPD/emphysema not on home O2  #Recent Distal LLE DVT   - 1 day of SOB, productive cough  - CXR --> bilateral interstitial opacifications   - currently on 4L NC, saturating 100%  - WBC 8, Lactate 1.6, VBG pCO2: 47  - Trop 0.04, BNP 1779  - s/p Azithro, Cefepime, flagyl in ED  - RVP negative strep, legionella, MRSA   - hx of distal DVT, now hypoxic and tachycardic   - duplex-> dvt in left gastrocnemius   - ct angio- neg for PE   - as per last pulm eval: has significant emphysema on CT and likely significant COPD ; unlikely to be able to perform PFTs   - s/p solumedrol 125 mg in ED, continue with solumedrol 40mg daily   - wean O2 to 89-92%  - as per family, this is patient's 3rd admission for aspiration PNA in past 4 months   - procal- 0.14  - c/w cefepime, levaquin     #LLE distal DVT   - Duplex (3/24/23) noted thrombus in left gastrocnemius and peroneal veins  - evaluated by Vascular; no need for AC, repeat LE duplex in 1 week  - LE duplex-   >> DVT noted in the left gastrocnemius vein. Superficial thrombosis noted in the bilateral greater saphenous    #Hypercalcemia  - Ca 10.7 (corrected 11.6)  - hold HCTZ  - gentle hydration for 24hrs     #Dyspepsia  #Peptic Ulcer Disease  - continue with protonix BID, and Carafate   - endoscopy was not performed before due to age and comorbidities   - GI f/u OP on Wednesday    #CKD   - Cr  1.4 on admission   - was following with Nephrology in [place]    #Elevated Troponins  - Trops 0.04 (previously 0.04 > 0.03)  - no chest pain    #Normocytic Anemia  - Hg 10.4 (unknown baseline)  - keep active T&S, transfuse if Hg<7    #HTN  - was on amlodipine 2.5 mg daily, HCTZ 12.5mg daily and lisinopril 10mg daily in assisted living   - hold HCTZ for hypercalcemia   - continue with lisinopril and amlodipine     #DM  - takes pioglitazone 15mg daily  - c/w insulin lantus 9U at bedtime, lispro 3U TID ; monitor FS  - A1c 7.4% (3/2023)    #DVT PPx: lovenox  #GI PPx: PPI  #Diet: Soft and Bite Sized   DNR/DNI

## 2023-04-08 NOTE — PROGRESS NOTE ADULT - SUBJECTIVE AND OBJECTIVE BOX
Patient is a 89y old  Female who presents with a chief complaint of SOB (07 Apr 2023 15:13)      OVERNIGHT EVENTS: remaiend stable but still on 2-3 L NC  no reported denies fever, chills, syncope, seizure, headache,  abd pain, N/V/D, urinary symptoms, legs swelling,    SUBJECTIVE / INTERVAL HPI: Patient seen and examined at bedside.     VITAL SIGNS:  Vital Signs Last 24 Hrs  T(C): 35.6 (08 Apr 2023 04:39), Max: 36.8 (07 Apr 2023 21:00)  T(F): 96 (08 Apr 2023 04:39), Max: 98.2 (07 Apr 2023 21:00)  HR: 83 (08 Apr 2023 04:39) (83 - 98)  BP: 142/72 (08 Apr 2023 04:39) (119/68 - 142/72)  BP(mean): --  RR: 18 (08 Apr 2023 04:39) (18 - 20)  SpO2: 99% (08 Apr 2023 04:39) (99% - 100%)    Parameters below as of 08 Apr 2023 04:39  Patient On (Oxygen Delivery Method): nasal cannula  O2 Flow (L/min): 2      PHYSICAL EXAM:    General: old thin lady chronically llok ill   HEENT: NC/AT; PERRL, clear conjunctiva  Neck: supple  Cardiovascular: +S1/S2; RRR  Respiratory: dec air entry b/l, no wheezing or crackles   Gastrointestinal: soft, NT/ND; +BSx4  Extremities: WWP; 2+ peripheral pulses; no edema   Neurological: AAOx2-3; moves all exts, no focal deficits    MEDICATIONS:  MEDICATIONS  (STANDING):  albuterol/ipratropium for Nebulization 3 milliLiter(s) Nebulizer every 12 hours  amLODIPine   Tablet 2.5 milliGRAM(s) Oral daily  apixaban 5 milliGRAM(s) Oral every 12 hours  atorvastatin 20 milliGRAM(s) Oral at bedtime  budesonide  80 MICROgram(s)/formoterol 4.5 MICROgram(s) Inhaler 2 Puff(s) Inhalation two times a day  cefepime   IVPB      cefepime   IVPB 1000 milliGRAM(s) IV Intermittent every 24 hours  chlorhexidine 2% Cloths 1 Application(s) Topical <User Schedule>  dextrose 5%. 1000 milliLiter(s) (50 mL/Hr) IV Continuous <Continuous>  dextrose 5%. 1000 milliLiter(s) (100 mL/Hr) IV Continuous <Continuous>  dextrose 50% Injectable 25 Gram(s) IV Push once  dextrose 50% Injectable 12.5 Gram(s) IV Push once  dextrose 50% Injectable 25 Gram(s) IV Push once  glucagon  Injectable 1 milliGRAM(s) IntraMuscular once  insulin glargine Injectable (LANTUS) 10 Unit(s) SubCutaneous every morning  insulin lispro (ADMELOG) corrective regimen sliding scale   SubCutaneous three times a day before meals  insulin lispro (ADMELOG) corrective regimen sliding scale   SubCutaneous at bedtime  insulin lispro Injectable (ADMELOG) 3 Unit(s) SubCutaneous three times a day before meals  levoFLOXacin IVPB 500 milliGRAM(s) IV Intermittent every 24 hours  melatonin 5 milliGRAM(s) Oral at bedtime  pantoprazole    Tablet 40 milliGRAM(s) Oral two times a day  predniSONE   Tablet 20 milliGRAM(s) Oral daily  senna 2 Tablet(s) Oral at bedtime  sucralfate 1 Gram(s) Oral two times a day    MEDICATIONS  (PRN):  acetaminophen     Tablet .. 650 milliGRAM(s) Oral every 6 hours PRN Moderate Pain (4 - 6)  albuterol/ipratropium for Nebulization 3 milliLiter(s) Nebulizer every 4 hours PRN Shortness of Breath and/or Wheezing  aluminum hydroxide/magnesium hydroxide/simethicone Suspension 30 milliLiter(s) Oral every 4 hours PRN Dyspepsia  dextrose Oral Gel 15 Gram(s) Oral once PRN Blood Glucose LESS THAN 70 milliGRAM(s)/deciliter  polyethylene glycol 3350 17 Gram(s) Oral daily PRN Constipation      ALLERGIES:  Allergies    penicillin (Unknown)    Intolerances        LABS:                        8.4    9.42  )-----------( 543      ( 08 Apr 2023 01:22 )             26.3     04-08    133<L>  |  100  |  56<H>  ----------------------------<  133<H>  5.2<H>   |  23  |  1.7<H>    Ca    9.4      08 Apr 2023 01:22  Mg     1.8     04-08    TPro  5.9<L>  /  Alb  2.6<L>  /  TBili  <0.2  /  DBili  x   /  AST  18  /  ALT  11  /  AlkPhos  122<H>  04-08        CAPILLARY BLOOD GLUCOSE      POCT Blood Glucose.: 311 mg/dL (08 Apr 2023 11:45)      RADIOLOGY & ADDITIONAL TESTS: Reviewed.

## 2023-04-08 NOTE — PROGRESS NOTE ADULT - ASSESSMENT
89 year old female withPMHx of HTN, DM, CKD, active smoker, COPD not on home O2 recent admission for aspiration PNA with incidental finding of distal DVT presented to the ED for hypoxia    Acute Hypoxic respiratory failure Likely secondary to Aspiration PNA/Bilateral with sepsis POA  CT angio no PE but b/l Lower lobe and rt middle lobe consolidations   COPD not on home 02  Active smoker   Recent Distal DVT  currently on 2L NC,   RVP negative strep, legionella, strep, bcx and MRSA,   procal 0.14  duplex-> dvt in left gastrocnemius   ct angio- neg for PE   On cefepime and LEvaquin,  plan for 7 day course, end date 4/9  continue with prednisone taper  echo ef 55%, GIDD, mod MS, mild AS   diet per speech and swallow   - PT    LLE distal DVT   - Duplex (3/24/23) noted thrombus in left gastrocnemius and peroneal veins  - repeat duplex unchanged  - started Eliquis given pt is not ambulating and remains on bed     hyponatremia - improved  HCTZ on hold    Dyspepsia  Peptic Ulcer Disease  - continue with protonix BID, and Carafate     CKD III  - cr at baseline  - keep off Lisinopril   - was following with Nephrology in [place]  - encourage PO intake,    HTN  - was on amlodipine 2.5 mg daily, HCTZ 12.5mg daily and lisinopril 10mg daily in assisted living   - holding HCTZ and lisinopril     DM  - takes pioglitazone 15mg daily  - c/w insulin lantus 10U at bedtime, lispro 3U TID ; monitor FS  - A1c 7.4% (3/2023)    DNR/DNI    Pending: dc to assisted living likely in 24 hours,  daughter updated at bedside

## 2023-04-09 LAB
ANION GAP SERPL CALC-SCNC: 9 MMOL/L — SIGNIFICANT CHANGE UP (ref 7–14)
BUN SERPL-MCNC: 60 MG/DL — HIGH (ref 10–20)
CALCIUM SERPL-MCNC: 9.6 MG/DL — SIGNIFICANT CHANGE UP (ref 8.4–10.5)
CHLORIDE SERPL-SCNC: 100 MMOL/L — SIGNIFICANT CHANGE UP (ref 98–110)
CO2 SERPL-SCNC: 24 MMOL/L — SIGNIFICANT CHANGE UP (ref 17–32)
CREAT SERPL-MCNC: 1.7 MG/DL — HIGH (ref 0.7–1.5)
EGFR: 28 ML/MIN/1.73M2 — LOW
GLUCOSE BLDC GLUCOMTR-MCNC: 102 MG/DL — HIGH (ref 70–99)
GLUCOSE BLDC GLUCOMTR-MCNC: 177 MG/DL — HIGH (ref 70–99)
GLUCOSE BLDC GLUCOMTR-MCNC: 179 MG/DL — HIGH (ref 70–99)
GLUCOSE BLDC GLUCOMTR-MCNC: 190 MG/DL — HIGH (ref 70–99)
GLUCOSE SERPL-MCNC: 83 MG/DL — SIGNIFICANT CHANGE UP (ref 70–99)
HCT VFR BLD CALC: 27.6 % — LOW (ref 37–47)
HGB BLD-MCNC: 8.8 G/DL — LOW (ref 12–16)
MAGNESIUM SERPL-MCNC: 1.9 MG/DL — SIGNIFICANT CHANGE UP (ref 1.8–2.4)
MCHC RBC-ENTMCNC: 28.7 PG — SIGNIFICANT CHANGE UP (ref 27–31)
MCHC RBC-ENTMCNC: 31.9 G/DL — LOW (ref 32–37)
MCV RBC AUTO: 89.9 FL — SIGNIFICANT CHANGE UP (ref 81–99)
NRBC # BLD: 0 /100 WBCS — SIGNIFICANT CHANGE UP (ref 0–0)
PLATELET # BLD AUTO: 571 K/UL — HIGH (ref 130–400)
POTASSIUM SERPL-MCNC: 5.1 MMOL/L — HIGH (ref 3.5–5)
POTASSIUM SERPL-SCNC: 5.1 MMOL/L — HIGH (ref 3.5–5)
RBC # BLD: 3.07 M/UL — LOW (ref 4.2–5.4)
RBC # FLD: 16.7 % — HIGH (ref 11.5–14.5)
SODIUM SERPL-SCNC: 133 MMOL/L — LOW (ref 135–146)
WBC # BLD: 8.54 K/UL — SIGNIFICANT CHANGE UP (ref 4.8–10.8)
WBC # FLD AUTO: 8.54 K/UL — SIGNIFICANT CHANGE UP (ref 4.8–10.8)

## 2023-04-09 PROCEDURE — 99231 SBSQ HOSP IP/OBS SF/LOW 25: CPT

## 2023-04-09 RX ORDER — HEPARIN SODIUM 5000 [USP'U]/ML
5000 INJECTION INTRAVENOUS; SUBCUTANEOUS EVERY 8 HOURS
Refills: 0 | Status: DISCONTINUED | OUTPATIENT
Start: 2023-04-10 | End: 2023-04-15

## 2023-04-09 RX ADMIN — Medication 1: at 12:10

## 2023-04-09 RX ADMIN — CHLORHEXIDINE GLUCONATE 1 APPLICATION(S): 213 SOLUTION TOPICAL at 05:48

## 2023-04-09 RX ADMIN — SENNA PLUS 2 TABLET(S): 8.6 TABLET ORAL at 21:15

## 2023-04-09 RX ADMIN — INSULIN GLARGINE 10 UNIT(S): 100 INJECTION, SOLUTION SUBCUTANEOUS at 08:57

## 2023-04-09 RX ADMIN — APIXABAN 5 MILLIGRAM(S): 2.5 TABLET, FILM COATED ORAL at 05:48

## 2023-04-09 RX ADMIN — Medication 1: at 17:23

## 2023-04-09 RX ADMIN — Medication 3 UNIT(S): at 12:08

## 2023-04-09 RX ADMIN — BUDESONIDE AND FORMOTEROL FUMARATE DIHYDRATE 2 PUFF(S): 160; 4.5 AEROSOL RESPIRATORY (INHALATION) at 08:59

## 2023-04-09 RX ADMIN — Medication 3 MILLILITER(S): at 08:16

## 2023-04-09 RX ADMIN — Medication 3 UNIT(S): at 17:21

## 2023-04-09 RX ADMIN — Medication 650 MILLIGRAM(S): at 21:19

## 2023-04-09 RX ADMIN — Medication 1 GRAM(S): at 05:47

## 2023-04-09 RX ADMIN — Medication 3 UNIT(S): at 08:56

## 2023-04-09 RX ADMIN — ATORVASTATIN CALCIUM 20 MILLIGRAM(S): 80 TABLET, FILM COATED ORAL at 21:15

## 2023-04-09 RX ADMIN — PANTOPRAZOLE SODIUM 40 MILLIGRAM(S): 20 TABLET, DELAYED RELEASE ORAL at 17:33

## 2023-04-09 RX ADMIN — PANTOPRAZOLE SODIUM 40 MILLIGRAM(S): 20 TABLET, DELAYED RELEASE ORAL at 05:48

## 2023-04-09 RX ADMIN — CEFEPIME 100 MILLIGRAM(S): 1 INJECTION, POWDER, FOR SOLUTION INTRAMUSCULAR; INTRAVENOUS at 09:05

## 2023-04-09 RX ADMIN — Medication 3 MILLILITER(S): at 20:25

## 2023-04-09 RX ADMIN — Medication 5 MILLIGRAM(S): at 21:15

## 2023-04-09 RX ADMIN — Medication 30 MILLILITER(S): at 10:50

## 2023-04-09 RX ADMIN — Medication 30 MILLILITER(S): at 16:49

## 2023-04-09 RX ADMIN — AMLODIPINE BESYLATE 2.5 MILLIGRAM(S): 2.5 TABLET ORAL at 05:47

## 2023-04-09 RX ADMIN — Medication 20 MILLIGRAM(S): at 05:48

## 2023-04-09 RX ADMIN — Medication 1 GRAM(S): at 17:33

## 2023-04-09 NOTE — PHYSICAL THERAPY INITIAL EVALUATION ADULT - GENERAL OBSERVATIONS, REHAB EVAL
16:00-16:33. Pt encountered semifowler in bed in NAD, + O2 2 lpm via NC, + primafit, daughter present at b/s. Pt reported burning sensation L side of chest/under breast region when asked to point to location that comes and goes, pain level 8/10. Pt was agreeable to PT. Pt is very [place], has an amplifier present at b/s. SPO2 at rest on 2 L 98%,  bpm. Maria M AVENDAÑO notified.

## 2023-04-09 NOTE — PHYSICAL THERAPY INITIAL EVALUATION ADULT - PERTINENT HX OF CURRENT PROBLEM, REHAB EVAL
89 year old female withPMHx of HTN, DM, CKD, active smoker, COPD not on home O2, recent admission for aspiration PNA with incidental finding of distal DVT presented to the ED for hypoxia. Patient is limited historian, history obtained from charts and by daughters at bedside. They report that patient has been in usual health since her discharge on 3/25 until this morning when she was noted to be short of breath and coughing more than usual. She has small amount of white phlegm. She was hypoxic to 85% on RA and was sent to the ED. Denies any fevers, chills, chest pain, abdominal pain, nausea, vomiting, diarrhea, constipation. Patient completed Vantin course on 3/30/23.

## 2023-04-09 NOTE — PROGRESS NOTE ADULT - ASSESSMENT
89 year old female withPMHx of HTN, DM, CKD, active smoker, COPD not on home O2 recent admission for aspiration PNA with incidental finding of distal DVT presented to the ED for hypoxia    Acute Hypoxic respiratory failure Likely secondary to Aspiration PNA/Bilateral with sepsis POA  CT angio no PE but b/l Lower lobe and rt middle lobe consolidations   COPD not on home 02  Active smoker   Recent Distal DVT  now off O2  RVP negative strep, legionella, strep, bcx and MRSA,   procal 0.14  ct angio- neg for PE   On cefepime and LEvaquin,  plan for 7 day course, end date 4/9  continue with prednisone taper  echo ef 55%, GIDD, mod MS, mild AS   diet per speech and swallow   - PT  - [facility], per CM cant go today    LLE distal DVT   - Duplex (3/24/23) noted thrombus in left gastrocnemius and peroneal veins  - repeat duplex unchanged  - started Eliquis given pt is not ambulating and remains on bed     hyponatremia - improved  HCTZ on hold    Dyspepsia  Peptic Ulcer Disease  - continue with protonix BID, and Carafate     CKD III  - cr at baseline  - keep off Lisinopril   - was following with Nephrology in [place]  - encourage PO intake,    HTN  - was on amlodipine 2.5 mg daily, HCTZ 12.5mg daily and lisinopril 10mg daily in assisted living   - holding HCTZ and lisinopril     DM  - takes pioglitazone 15mg daily  - c/w insulin lantus 10U at bedtime, lispro 3U TID ; monitor FS  - A1c 7.4% (3/2023)    DNR/DNI    Pending: [facility] per CM  daughter updated at bedside 4/8   89 year old female withPMHx of HTN, DM, CKD, active smoker, COPD not on home O2 recent admission for aspiration PNA with incidental finding of distal DVT presented to the ED for hypoxia    Acute Hypoxic respiratory failure Likely secondary to Aspiration PNA/Bilateral with sepsis POA  CT angio no PE but b/l Lower lobe and rt middle lobe consolidations   COPD not on home 02  Active smoker   Recent Distal DVT  now off O2  RVP negative strep, legionella, strep, bcx and MRSA,   procal 0.14  ct angio- neg for PE   On cefepime and LEvaquin,  plan for 7 day course, end date 4/9  continue with prednisone taper  echo ef 55%, GIDD, mod MS, mild AS   diet per speech and swallow   - PT  - [facility], per CM cant go today    LLE distal DVT   - Duplex (3/24/23) noted thrombus in left gastrocnemius and peroneal veins  - repeat duplex unchanged, was seen by vascular on recent admission  - was started Eliquis given pt is not ambulating and remains on bed   but pt has hx of PUD, and c/o of intermittent epigastric burn/ discomfort, would be at risk of GIB, daughter James (at bedside) decided to hold on AC    hyponatremia - improved  HCTZ on hold    Dyspepsia  Peptic Ulcer Disease  - continue with protonix BID, and Carafate     CKD III  - cr at baseline  - keep off Lisinopril   - was following with Nephrology in [place]  - encourage PO intake,    HTN  - was on amlodipine 2.5 mg daily, HCTZ 12.5mg daily and lisinopril 10mg daily in assisted living   - holding HCTZ and lisinopril     DM  - takes pioglitazone 15mg daily  - c/w insulin lantus 10U at bedtime, lispro 3U TID ; monitor FS  - A1c 7.4% (3/2023)    DNR/DNI    Pending: [facility] per CM  daughter updated at bedside 4/8   89 year old female withPMHx of HTN, DM, CKD, active smoker, COPD not on home O2 recent admission for aspiration PNA with incidental finding of distal DVT presented to the ED for hypoxia    Acute Hypoxic respiratory failure Likely secondary to Aspiration PNA/Bilateral with sepsis POA  CT angio no PE but b/l Lower lobe and rt middle lobe consolidations   COPD not on home 02  Active smoker   Recent Distal DVT  now off O2  RVP negative strep, legionella, strep, bcx and MRSA,   procal 0.14  ct angio- neg for PE   On cefepime and LEvaquin,  plan for 7 day course, end date 4/9  continue with prednisone taper  echo ef 55%, GIDD, mod MS, mild AS   diet per speech and swallow   - PT  - [facility], per CM cant go today    LLE distal DVT   - Duplex (3/24/23) noted thrombus in left gastrocnemius and peroneal veins  - repeat duplex unchanged, was seen by vascular on recent admission  - was started Eliquis given pt is not ambulating and remains on bed   but pt has hx of PUD, and c/o of intermittent epigastric burn/ discomfort, would be at risk of GIB, daughter Meena Powell (at bedside) decided to hold on AC    hyponatremia - improved  HCTZ on hold    Dyspepsia  Peptic Ulcer Disease  - continue with protonix BID, and Carafate     CKD III  - cr at baseline  - keep off Lisinopril   - was following with Nephrology in [place]  - encourage PO intake,    HTN  - was on amlodipine 2.5 mg daily, HCTZ 12.5mg daily and lisinopril 10mg daily in assisted living   - holding HCTZ and lisinopril     DM  - takes pioglitazone 15mg daily  - c/w insulin lantus 10U at bedtime, lispro 3U TID ; monitor FS  - A1c 7.4% (3/2023)    DNR/DNI    Pending: [facility] per CM  daughter updated at bedside 4/8   89 year old female withPMHx of HTN, DM, CKD, active smoker, COPD not on home O2 recent admission for aspiration PNA with incidental finding of distal DVT presented to the ED for hypoxia    Acute Hypoxic respiratory failure Likely secondary to Aspiration PNA/Bilateral with sepsis POA  CT angio no PE but b/l Lower lobe and rt middle lobe consolidations   COPD not on home 02  Active smoker   Recent Distal DVT  intermittently on 2 LNC  RVP negative strep, legionella, strep, bcx and MRSA,   procal 0.14  ct angio- neg for PE   On cefepime and LEvaquin,  plan for 7 day course, end date 4/9  continue with prednisone taper, 3 more days of prednisone 10mg   echo ef 55%, GIDD, mod MS, mild AS   diet per speech and swallow   - PT  - [facility], per CM cant go today    LLE distal DVT   - Duplex (3/24/23) noted thrombus in left gastrocnemius and peroneal veins  - repeat duplex unchanged, was seen by vascular on recent admission  - was started Eliquis given pt is not ambulating and remains on bed   but pt has hx of PUD, and c/o of intermittent epigastric burn/ discomfort, would be at risk of GIB, daughter Meena Powell (at bedside) decided to hold on AC    hyponatremia - improved  HCTZ on hold    Dyspepsia  Peptic Ulcer Disease  - continue with protonix BID, and Carafate     CKD III  - cr at baseline  - keep off Lisinopril   - was following with Nephrology in [place]  - encourage PO intake,    HTN  - was on amlodipine 2.5 mg daily, HCTZ 12.5mg daily and lisinopril 10mg daily in assisted living   - holding HCTZ and lisinopril     DM  - takes pioglitazone 15mg daily  - c/w insulin lantus 10U at bedtime, lispro 3U TID ; monitor FS  - A1c 7.4% (3/2023)    DNR/DNI    Pending: [facility] per [facility]  daughter updated at bedside 4/9

## 2023-04-09 NOTE — PHYSICAL THERAPY INITIAL EVALUATION ADULT - GAIT PATTERN USED, PT EVAL
3-point gait Paramedian Forehead Flap Text: A decision was made to reconstruct the defect utilizing an interpolation axial flap and a staged reconstruction.  A telfa template was made of the defect.  This telfa template was then used to outline the paramedian forehead pedicle flap.  The donor area for the pedicle flap was then injected with anesthesia.  The flap was excised through the skin and subcutaneous tissue down to the layer of the underlying musculature.  The pedicle flap was carefully excised within this deep plane to maintain its blood supply.  The edges of the donor site were undermined.   The donor site was closed in a primary fashion.  The pedicle was then rotated into position and sutured.  Once the tube was sutured into place, adequate blood supply was confirmed with blanching and refill.  The pedicle was then wrapped with xeroform gauze and dressed appropriately with a telfa and gauze bandage to ensure continued blood supply and protect the attached pedicle.

## 2023-04-09 NOTE — PROGRESS NOTE ADULT - SUBJECTIVE AND OBJECTIVE BOX
Patient is a 89y old  Female who presents with a chief complaint of SOB (08 Apr 2023 13:49)      OVERNIGHT EVENTS: remained stable, off O2 today    SUBJECTIVE / INTERVAL HPI: Patient seen and examined at bedside.     VITAL SIGNS:  Vital Signs Last 24 Hrs  T(C): 36.1 (09 Apr 2023 05:27), Max: 36.6 (08 Apr 2023 20:15)  T(F): 97 (09 Apr 2023 05:27), Max: 97.9 (08 Apr 2023 20:15)  HR: 90 (09 Apr 2023 05:27) (90 - 105)  BP: 118/65 (09 Apr 2023 05:27) (115/64 - 128/60)  BP(mean): --  RR: 19 (09 Apr 2023 05:27) (18 - 19)  SpO2: 99% (09 Apr 2023 05:27) (97% - 99%)    Parameters below as of 09 Apr 2023 05:27  Patient On (Oxygen Delivery Method): nasal cannula        PHYSICAL EXAM:      General: old thin lady chronically llok ill   HEENT: NC/AT; PERRL, clear conjunctiva  Neck: supple  Cardiovascular: +S1/S2; RRR  Respiratory: dec air entry b/l, no wheezing or crackles   Gastrointestinal: soft, NT/ND; +BSx4  Extremities: WWP; 2+ peripheral pulses; no edema   Neurological: AAOx2-3; moves all exts, no focal deficits    MEDICATIONS:  MEDICATIONS  (STANDING):  albuterol/ipratropium for Nebulization 3 milliLiter(s) Nebulizer every 12 hours  amLODIPine   Tablet 2.5 milliGRAM(s) Oral daily  apixaban 5 milliGRAM(s) Oral every 12 hours  atorvastatin 20 milliGRAM(s) Oral at bedtime  budesonide  80 MICROgram(s)/formoterol 4.5 MICROgram(s) Inhaler 2 Puff(s) Inhalation two times a day  cefepime   IVPB      cefepime   IVPB 1000 milliGRAM(s) IV Intermittent every 24 hours  chlorhexidine 2% Cloths 1 Application(s) Topical <User Schedule>  dextrose 5%. 1000 milliLiter(s) (50 mL/Hr) IV Continuous <Continuous>  dextrose 5%. 1000 milliLiter(s) (100 mL/Hr) IV Continuous <Continuous>  dextrose 50% Injectable 25 Gram(s) IV Push once  dextrose 50% Injectable 12.5 Gram(s) IV Push once  dextrose 50% Injectable 25 Gram(s) IV Push once  glucagon  Injectable 1 milliGRAM(s) IntraMuscular once  insulin glargine Injectable (LANTUS) 10 Unit(s) SubCutaneous every morning  insulin lispro (ADMELOG) corrective regimen sliding scale   SubCutaneous three times a day before meals  insulin lispro (ADMELOG) corrective regimen sliding scale   SubCutaneous at bedtime  insulin lispro Injectable (ADMELOG) 3 Unit(s) SubCutaneous three times a day before meals  levoFLOXacin IVPB 500 milliGRAM(s) IV Intermittent every 24 hours  melatonin 5 milliGRAM(s) Oral at bedtime  pantoprazole    Tablet 40 milliGRAM(s) Oral two times a day  predniSONE   Tablet 20 milliGRAM(s) Oral daily  senna 2 Tablet(s) Oral at bedtime  sucralfate 1 Gram(s) Oral two times a day    MEDICATIONS  (PRN):  acetaminophen     Tablet .. 650 milliGRAM(s) Oral every 6 hours PRN Moderate Pain (4 - 6)  albuterol/ipratropium for Nebulization 3 milliLiter(s) Nebulizer every 4 hours PRN Shortness of Breath and/or Wheezing  aluminum hydroxide/magnesium hydroxide/simethicone Suspension 30 milliLiter(s) Oral every 4 hours PRN Dyspepsia  dextrose Oral Gel 15 Gram(s) Oral once PRN Blood Glucose LESS THAN 70 milliGRAM(s)/deciliter  polyethylene glycol 3350 17 Gram(s) Oral daily PRN Constipation      ALLERGIES:  Allergies    penicillin (Unknown)    Intolerances        LABS:                        8.8    8.54  )-----------( 571      ( 09 Apr 2023 06:59 )             27.6     04-09    133<L>  |  100  |  60<H>  ----------------------------<  83  5.1<H>   |  24  |  1.7<H>    Ca    9.6      09 Apr 2023 06:59  Mg     1.9     04-09    TPro  5.9<L>  /  Alb  2.6<L>  /  TBili  <0.2  /  DBili  x   /  AST  18  /  ALT  11  /  AlkPhos  122<H>  04-08        CAPILLARY BLOOD GLUCOSE      POCT Blood Glucose.: 102 mg/dL (09 Apr 2023 08:21)      RADIOLOGY & ADDITIONAL TESTS: Reviewed.     Patient is a 89y old  Female who presents with a chief complaint of SOB (08 Apr 2023 13:49)      OVERNIGHT EVENTS: remained stable, intermittently on 2 LNC    SUBJECTIVE / INTERVAL HPI: Patient seen and examined at bedside.     VITAL SIGNS:  Vital Signs Last 24 Hrs  T(C): 36.1 (09 Apr 2023 05:27), Max: 36.6 (08 Apr 2023 20:15)  T(F): 97 (09 Apr 2023 05:27), Max: 97.9 (08 Apr 2023 20:15)  HR: 90 (09 Apr 2023 05:27) (90 - 105)  BP: 118/65 (09 Apr 2023 05:27) (115/64 - 128/60)  BP(mean): --  RR: 19 (09 Apr 2023 05:27) (18 - 19)  SpO2: 99% (09 Apr 2023 05:27) (97% - 99%)    Parameters below as of 09 Apr 2023 05:27  Patient On (Oxygen Delivery Method): nasal cannula        PHYSICAL EXAM:      General: old thin lady chronically look ill   HEENT: NC/AT; PERRL, clear conjunctiva  Neck: supple  Cardiovascular: +S1/S2; RRR  Respiratory: dec air entry b/l, no wheezing or crackles   Gastrointestinal: soft, NT/ND; +BSx4  Extremities: WWP; 2+ peripheral pulses; no edema   Neurological: AAOx2-3; moves all exts, no focal deficits    MEDICATIONS:  MEDICATIONS  (STANDING):  albuterol/ipratropium for Nebulization 3 milliLiter(s) Nebulizer every 12 hours  amLODIPine   Tablet 2.5 milliGRAM(s) Oral daily  apixaban 5 milliGRAM(s) Oral every 12 hours  atorvastatin 20 milliGRAM(s) Oral at bedtime  budesonide  80 MICROgram(s)/formoterol 4.5 MICROgram(s) Inhaler 2 Puff(s) Inhalation two times a day  cefepime   IVPB      cefepime   IVPB 1000 milliGRAM(s) IV Intermittent every 24 hours  chlorhexidine 2% Cloths 1 Application(s) Topical <User Schedule>  dextrose 5%. 1000 milliLiter(s) (50 mL/Hr) IV Continuous <Continuous>  dextrose 5%. 1000 milliLiter(s) (100 mL/Hr) IV Continuous <Continuous>  dextrose 50% Injectable 25 Gram(s) IV Push once  dextrose 50% Injectable 12.5 Gram(s) IV Push once  dextrose 50% Injectable 25 Gram(s) IV Push once  glucagon  Injectable 1 milliGRAM(s) IntraMuscular once  insulin glargine Injectable (LANTUS) 10 Unit(s) SubCutaneous every morning  insulin lispro (ADMELOG) corrective regimen sliding scale   SubCutaneous three times a day before meals  insulin lispro (ADMELOG) corrective regimen sliding scale   SubCutaneous at bedtime  insulin lispro Injectable (ADMELOG) 3 Unit(s) SubCutaneous three times a day before meals  levoFLOXacin IVPB 500 milliGRAM(s) IV Intermittent every 24 hours  melatonin 5 milliGRAM(s) Oral at bedtime  pantoprazole    Tablet 40 milliGRAM(s) Oral two times a day  predniSONE   Tablet 20 milliGRAM(s) Oral daily  senna 2 Tablet(s) Oral at bedtime  sucralfate 1 Gram(s) Oral two times a day    MEDICATIONS  (PRN):  acetaminophen     Tablet .. 650 milliGRAM(s) Oral every 6 hours PRN Moderate Pain (4 - 6)  albuterol/ipratropium for Nebulization 3 milliLiter(s) Nebulizer every 4 hours PRN Shortness of Breath and/or Wheezing  aluminum hydroxide/magnesium hydroxide/simethicone Suspension 30 milliLiter(s) Oral every 4 hours PRN Dyspepsia  dextrose Oral Gel 15 Gram(s) Oral once PRN Blood Glucose LESS THAN 70 milliGRAM(s)/deciliter  polyethylene glycol 3350 17 Gram(s) Oral daily PRN Constipation      ALLERGIES:  Allergies    penicillin (Unknown)    Intolerances        LABS:                        8.8    8.54  )-----------( 571      ( 09 Apr 2023 06:59 )             27.6     04-09    133<L>  |  100  |  60<H>  ----------------------------<  83  5.1<H>   |  24  |  1.7<H>    Ca    9.6      09 Apr 2023 06:59  Mg     1.9     04-09    TPro  5.9<L>  /  Alb  2.6<L>  /  TBili  <0.2  /  DBili  x   /  AST  18  /  ALT  11  /  AlkPhos  122<H>  04-08        CAPILLARY BLOOD GLUCOSE      POCT Blood Glucose.: 102 mg/dL (09 Apr 2023 08:21)      RADIOLOGY & ADDITIONAL TESTS: Reviewed.

## 2023-04-10 LAB
ANION GAP SERPL CALC-SCNC: 13 MMOL/L — SIGNIFICANT CHANGE UP (ref 7–14)
BUN SERPL-MCNC: 68 MG/DL — CRITICAL HIGH (ref 10–20)
CALCIUM SERPL-MCNC: 9.4 MG/DL — SIGNIFICANT CHANGE UP (ref 8.4–10.5)
CHLORIDE SERPL-SCNC: 98 MMOL/L — SIGNIFICANT CHANGE UP (ref 98–110)
CO2 SERPL-SCNC: 22 MMOL/L — SIGNIFICANT CHANGE UP (ref 17–32)
CREAT SERPL-MCNC: 1.7 MG/DL — HIGH (ref 0.7–1.5)
EGFR: 28 ML/MIN/1.73M2 — LOW
GLUCOSE BLDC GLUCOMTR-MCNC: 141 MG/DL — HIGH (ref 70–99)
GLUCOSE BLDC GLUCOMTR-MCNC: 154 MG/DL — HIGH (ref 70–99)
GLUCOSE BLDC GLUCOMTR-MCNC: 204 MG/DL — HIGH (ref 70–99)
GLUCOSE BLDC GLUCOMTR-MCNC: 228 MG/DL — HIGH (ref 70–99)
GLUCOSE SERPL-MCNC: 150 MG/DL — HIGH (ref 70–99)
HCT VFR BLD CALC: 29 % — LOW (ref 37–47)
HGB BLD-MCNC: 9 G/DL — LOW (ref 12–16)
MAGNESIUM SERPL-MCNC: 2 MG/DL — SIGNIFICANT CHANGE UP (ref 1.8–2.4)
MCHC RBC-ENTMCNC: 28.1 PG — SIGNIFICANT CHANGE UP (ref 27–31)
MCHC RBC-ENTMCNC: 31 G/DL — LOW (ref 32–37)
MCV RBC AUTO: 90.6 FL — SIGNIFICANT CHANGE UP (ref 81–99)
NRBC # BLD: 0 /100 WBCS — SIGNIFICANT CHANGE UP (ref 0–0)
PLATELET # BLD AUTO: 551 K/UL — HIGH (ref 130–400)
POTASSIUM SERPL-MCNC: 5.4 MMOL/L — HIGH (ref 3.5–5)
POTASSIUM SERPL-SCNC: 5.4 MMOL/L — HIGH (ref 3.5–5)
RBC # BLD: 3.2 M/UL — LOW (ref 4.2–5.4)
RBC # FLD: 16.7 % — HIGH (ref 11.5–14.5)
SARS-COV-2 RNA SPEC QL NAA+PROBE: SIGNIFICANT CHANGE UP
SODIUM SERPL-SCNC: 133 MMOL/L — LOW (ref 135–146)
WBC # BLD: 8.12 K/UL — SIGNIFICANT CHANGE UP (ref 4.8–10.8)
WBC # FLD AUTO: 8.12 K/UL — SIGNIFICANT CHANGE UP (ref 4.8–10.8)

## 2023-04-10 PROCEDURE — 99231 SBSQ HOSP IP/OBS SF/LOW 25: CPT

## 2023-04-10 RX ORDER — HYDROMORPHONE HYDROCHLORIDE 2 MG/ML
0.25 INJECTION INTRAMUSCULAR; INTRAVENOUS; SUBCUTANEOUS ONCE
Refills: 0 | Status: DISCONTINUED | OUTPATIENT
Start: 2023-04-10 | End: 2023-04-10

## 2023-04-10 RX ORDER — SODIUM ZIRCONIUM CYCLOSILICATE 10 G/10G
10 POWDER, FOR SUSPENSION ORAL ONCE
Refills: 0 | Status: COMPLETED | OUTPATIENT
Start: 2023-04-10 | End: 2023-04-10

## 2023-04-10 RX ADMIN — Medication 2: at 15:44

## 2023-04-10 RX ADMIN — Medication 1 GRAM(S): at 18:28

## 2023-04-10 RX ADMIN — HEPARIN SODIUM 5000 UNIT(S): 5000 INJECTION INTRAVENOUS; SUBCUTANEOUS at 05:27

## 2023-04-10 RX ADMIN — Medication 650 MILLIGRAM(S): at 21:14

## 2023-04-10 RX ADMIN — AMLODIPINE BESYLATE 2.5 MILLIGRAM(S): 2.5 TABLET ORAL at 05:27

## 2023-04-10 RX ADMIN — CHLORHEXIDINE GLUCONATE 1 APPLICATION(S): 213 SOLUTION TOPICAL at 05:27

## 2023-04-10 RX ADMIN — Medication 3 UNIT(S): at 11:22

## 2023-04-10 RX ADMIN — SODIUM ZIRCONIUM CYCLOSILICATE 10 GRAM(S): 10 POWDER, FOR SUSPENSION ORAL at 11:48

## 2023-04-10 RX ADMIN — Medication 2: at 18:26

## 2023-04-10 RX ADMIN — Medication 10 MILLIGRAM(S): at 05:27

## 2023-04-10 RX ADMIN — PANTOPRAZOLE SODIUM 40 MILLIGRAM(S): 20 TABLET, DELAYED RELEASE ORAL at 05:26

## 2023-04-10 RX ADMIN — Medication 3 MILLILITER(S): at 21:04

## 2023-04-10 RX ADMIN — Medication 1 GRAM(S): at 05:26

## 2023-04-10 RX ADMIN — Medication 5 MILLIGRAM(S): at 21:14

## 2023-04-10 RX ADMIN — Medication 3 UNIT(S): at 15:44

## 2023-04-10 RX ADMIN — HYDROMORPHONE HYDROCHLORIDE 0.25 MILLIGRAM(S): 2 INJECTION INTRAMUSCULAR; INTRAVENOUS; SUBCUTANEOUS at 12:53

## 2023-04-10 RX ADMIN — Medication 1: at 11:21

## 2023-04-10 RX ADMIN — Medication 650 MILLIGRAM(S): at 08:14

## 2023-04-10 RX ADMIN — ATORVASTATIN CALCIUM 20 MILLIGRAM(S): 80 TABLET, FILM COATED ORAL at 21:14

## 2023-04-10 RX ADMIN — PANTOPRAZOLE SODIUM 40 MILLIGRAM(S): 20 TABLET, DELAYED RELEASE ORAL at 18:28

## 2023-04-10 RX ADMIN — Medication 3 UNIT(S): at 18:26

## 2023-04-10 RX ADMIN — HEPARIN SODIUM 5000 UNIT(S): 5000 INJECTION INTRAVENOUS; SUBCUTANEOUS at 21:15

## 2023-04-10 RX ADMIN — INSULIN GLARGINE 10 UNIT(S): 100 INJECTION, SOLUTION SUBCUTANEOUS at 16:54

## 2023-04-10 RX ADMIN — BUDESONIDE AND FORMOTEROL FUMARATE DIHYDRATE 2 PUFF(S): 160; 4.5 AEROSOL RESPIRATORY (INHALATION) at 08:09

## 2023-04-10 RX ADMIN — SENNA PLUS 2 TABLET(S): 8.6 TABLET ORAL at 21:14

## 2023-04-10 NOTE — PROGRESS NOTE ADULT - ATTENDING COMMENTS
Patient seen at bedside. Changes made within note as well. Discussed with medical team that includes resident(s), medical student(s), nursing staff, case management.     89 year old female with PMHx of HTN, DM, CKD, active smoker, COPD not on home O2 recent admission for aspiration PNA with incidental finding of distal DVT presented to the ED for hypoxia    #Acute Hypoxic respiratory failure likely 2/2 asp PNA/Bilateral with sepsis POA  - CT angio: no PE but B/L lower lobe and R middle lobe consolidations   - COPD not on home 02  - Active smoker   - Recent Distal DVT  - intermittently on 2 LNC  - RVP, strep, legionella, bcx and MRSA negative   - procal 0.14  - s/p 7d course of cefepime and levaquin, completed 4/9  - c/w with prednisone taper, prednisone 10mg to complete on 4/12  - echo ef 55%, GIDD, mod MS, mild AS   - diet per speech and swallow   - PT  - [facility]  - pulse ox 98% on RA  patient not willing to ambulate to ambulatory oxygen was not checked     #LLE distal DVT   - Duplex (3/24/23) noted thrombus in left gastrocnemius and peroneal veins  - repeat duplex unchanged, was seen by vascular on recent admission  - was started on Eliquis given pt is not ambulating and remains on bed but pt has hx of PUD, and c/o of intermittent epigastric burn/ discomfort, would be at risk of GIB, meng Powell (at bedside) decided to hold on AC    # hyponatremia - stable  - HCTZ on hold    #Dyspepsia  #Peptic Ulcer Disease  - c/w protonix BID and carafate     #CKD III  - cr at baseline  - keep off lisinopril   - was following with Nephrology in [place]  - encourage PO intake    #HTN  - was on amlodipine 2.5 mg daily, HCTZ 12.5mg daily and lisinopril 10mg daily in assisted living   - holding HCTZ and lisinopril     #DM  - takes pioglitazone 15mg daily  - c/w insulin lantus 10U at bedtime, lispro 3U TID; monitor FS  - A1c 7.4% (3/2023)    DVT pp: heparin  GI ppx: protonix  Diet: Soft and bite sized/DASH/CC  Code: DNR/DNI  Pending: [facility] per CM, covid collected/pending, monitor oxygenation, if patient requires oxygen need to arrange oxygen before discharge

## 2023-04-10 NOTE — PROGRESS NOTE ADULT - ASSESSMENT
89 year old female with PMHx of HTN, DM, CKD, active smoker, COPD not on home O2 recent admission for aspiration PNA with incidental finding of distal DVT presented to the ED for hypoxia    #Acute Hypoxic respiratory failure likely 2/2 asp PNA/Bilateral with sepsis POA  - CT angio: no PE but B/L lower lobe and R middle lobe consolidations   - COPD not on home 02  - Active smoker   - Recent Distal DVT  - intermittently on 2 LNC  - RVP, strep, legionella, bcx and MRSA negative   - procal 0.14  - s/p 7d course of cefepime and levaquin, completed 4/9  - c/w with prednisone taper, prednisone 10mg to complete on 4/12  - echo ef 55%, GIDD, mod MS, mild AS   - diet per speech and swallow   - PT  - [facility]  - pulse ox 98% on RA    #LLE distal DVT   - Duplex (3/24/23) noted thrombus in left gastrocnemius and peroneal veins  - repeat duplex unchanged, was seen by vascular on recent admission  - was started on Eliquis given pt is not ambulating and remains on bed but pt has hx of PUD, and c/o of intermittent epigastric burn/ discomfort, would be at risk of GIB, daughter Meena Powell (at bedside) decided to hold on AC    # hyponatremia - stable  - HCTZ on hold    #Dyspepsia  #Peptic Ulcer Disease  - c/w protonix BID and carafate     #CKD III  - cr at baseline  - keep off lisinopril   - was following with Nephrology in [place]  - encourage PO intake    #HTN  - was on amlodipine 2.5 mg daily, HCTZ 12.5mg daily and lisinopril 10mg daily in assisted living   - holding HCTZ and lisinopril     #DM  - takes pioglitazone 15mg daily  - c/w insulin lantus 10U at bedtime, lispro 3U TID; monitor FS  - A1c 7.4% (3/2023)    DVT pp: heparin  GI ppx: protonix  Diet: Soft and bite sized/DASH/CC  Code: DNR/DNI  Pending: [facility] per CM, covid collected/pending Previously Declined (within the last year)

## 2023-04-10 NOTE — DISCHARGE NOTE PROVIDER - NSDCCPCAREPLAN_GEN_ALL_CORE_FT
PRINCIPAL DISCHARGE DIAGNOSIS  Diagnosis: Pneumonia  Assessment and Plan of Treatment: you had aspiration pneumonia. you completed a 7 day course of antibiotics on 4/9/23. you are also on steroids being tapered. you will take steroids until 4/12/23. please follow up with your pcp      SECONDARY DISCHARGE DIAGNOSES  Diagnosis: DVT, lower extremity  Assessment and Plan of Treatment: you have a clot in your left gastrocneumius and peroneal veins found in March 2023. a repeat vein scan showed no changes since then. you were restarted on eliquis but due to peptic ulcer disease and risk of GI bleed, it was decided by Meena Powell to hold eliquis    Diagnosis: Hyponatremia  Assessment and Plan of Treatment: your sodium was low. it increased while holding hydrochlorothiazide. please follow up with your pcp    Diagnosis: Stage 3 chronic kidney disease  Assessment and Plan of Treatment: you have chronic kidney disease. we were holding your lisinoprol for this. please follow up with your nephrologist and pcp     PRINCIPAL DISCHARGE DIAGNOSIS  Diagnosis: Pneumonia  Assessment and Plan of Treatment: you had aspiration pneumonia. you completed a 7 day course of antibiotics on 4/9/23. you were also on steroids. After discharge you must follow up with your pcp      SECONDARY DISCHARGE DIAGNOSES  Diagnosis: DVT, lower extremity  Assessment and Plan of Treatment: you have a clot in your left gastrocneumius and peroneal veins found in March 2023. a repeat vein scan showed no changes since then. you were restarted on eliquis but due to peptic ulcer disease and risk of GI bleed, it was decided by Meena Powell to hold eliquis    Diagnosis: Hyponatremia  Assessment and Plan of Treatment: your sodium was low. it increased while holding hydrochlorothiazide. please follow up with your pcp    Diagnosis: Stage 3 chronic kidney disease  Assessment and Plan of Treatment: you have chronic kidney disease. we were holding your lisinoprol for this. please follow up with your nephrologist and pcp

## 2023-04-10 NOTE — DISCHARGE NOTE PROVIDER - HOSPITAL COURSE
89yo female presented to ED w/ PMH Htn, Hld, Ckd, emphysema, gerd, Dm; pt biba from [facility] home for difficulty breathing, coughing,chest pain and hypoxia; pt currently not home O2 dep - noted to have PO RA 85 at facility; pt s/p recently admission for chronic pneumonia and chronic abdominal pain(gerd); endoscopy no done in hospital due to high morbidity; pt currenlty also on abx cefpidoxime at nursing home. Pt deneis fever, chills, sore throat, difficulty swallowing abdominal pain or urinary sx  In the ED, VS noted for T 36.9, HR 76, /72, RR 19, SpO2 98% on 4L NC. Labs noted for WBC 8, Hg 10, Cr 1.4, Glu 157, Ca 10.7 (corrected 11.6), Trop 0.04, BNP 1779.   CXR shows worsening R>L opacities.     89 year old female with PMHx of HTN, DM, CKD, active smoker, COPD not on home O2 recent admission for aspiration PNA with incidental finding of distal DVT presented to the ED for hypoxia    #Acute Hypoxic respiratory failure likely 2/2 asp PNA/Bilateral with sepsis POA  - CT angio: no PE but B/L lower lobe and R middle lobe consolidations   - COPD not on home 02  - Active smoker   - Recent Distal DVT  - intermittently on 2 LNC  - RVP, strep, legionella, bcx and MRSA negative   - procal 0.14  - s/p 7d course of cefepime and levaquin, completed 4/9  - c/w with prednisone taper, prednisone 10mg to complete on 4/12  - echo ef 55%, GIDD, mod MS, mild AS   - diet per speech and swallow   - PT  - pulse ox 98% on RA, unable to obtain ambulatory pulse ox due to pain  - dc to [facility] NH per CM    #LLE distal DVT   - Duplex (3/24/23) noted thrombus in left gastrocnemius and peroneal veins  - repeat duplex unchanged, was seen by vascular on recent admission  - was started on Eliquis given pt is not ambulating and remains on bed but pt has hx of PUD, and c/o of intermittent epigastric burn/ discomfort, would be at risk of GIB, daughter Meena Powell (at bedside) decided to hold on AC    # hyponatremia - stable  - HCTZ on hold    #Dyspepsia  #Peptic Ulcer Disease  - c/w protonix BID and carafate     #CKD III  - cr at baseline  - keep off lisinopril   - was following with Nephrology in [place]  - encourage PO intake    #HTN  - was on amlodipine 2.5 mg daily, HCTZ 12.5mg daily and lisinopril 10mg daily in assisted living   - holding HCTZ and lisinopril     #DM  - takes pioglitazone 15mg daily  - c/w insulin lantus 10U at bedtime, lispro 3U TID; monitor FS  - A1c 7.4% (3/2023)   DNR DNI 89 y/o woman w PMHx of HTN, HLD, DM, HFpEF, mitral stenosis, COPD, PUD, CKD3b w baseline Cr ~1.4, prior distal LLE DVT, w AC held 2/2 h/o PUD, recent admission for aspiration PNA and GERD (EGD not done 2/2 high risk), d/c on cefpodoxime, referred from [facility] for CP, SOB, hypoxia w SaO2 85% on RA, admitted for hypoxic respiratory failure.   While admitted, was treated w 7d course Cefepime/Levaquin completed 4/9/23 and completed prednisone taper 4/12/23. Noted to have abd pain 2/2 constipation/fecal impaction w mesenteric doppler and VQ scan neg. Constipation has resolved w aggressive bowel regimen including golytely. Was off AC due to bleed risk given h/o PUD but now restarted on Lovenox 50mg BID w/o evidence of re-bleeding.       #Acute Hypoxic respiratory failure / multifactorial dyspnea  #suspected PNA - treated   #diastolic CHF/ MS   #COPD in active smoker   - CT angio: no PE but B/L lower lobe and R middle lobe consolidations, recent V/Q scan has low probability of PE   - RVP, strep, legionella, bcx and MRSA negative, procal 0.14  - s/p 7d course of cefepime and levaquin, completed 4/9, no more Abx recommended by ID   - prednisone taper complete on 4/12/23  - start fluid restriction 1200 ml in 24 hours   - I&O monitoring, daily weight, incentive spirometry, maintain fluid balance  - supportive care, monitor pulse Ox, taper off oxygen if tolerated w SpO2 goal 88-92%     # Dyspepsia/ Peptic Ulcer Disease/Hiatal hernia  # Constipation - resolved   - GI consult: high risk for endoscopy, c/w conservative management   - Cont PPI IV BID, c/w carafate 1g BID  - mesenteric doppler is negative for stenosis   - tramadol for pain control  - bowel regimen miralax, senna, tap water enema q8 prn, lactulose prn  - Received Golytely 1L for severe constipation - 4/17/23 passing BMs     #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   - HCTZ on hold  - 4/17/23 d/c salt restriction on diet     #CKD3b   - Cr baseline ~1.4, currently at baseline  - holding lisinopril     #HTN  - c/w amlodipine 2.5 mg daily  - holding HCTZ and lisinopril     #DM  - takes pioglitazone 15mg daily, A1c 7.4% (3/2023)  - c/w insulin lantus 10U at bedtime, lispro 3U TID; monitor FS   DNR DNI 89 y/o woman w PMHx of HTN, HLD, DM, HFpEF, mitral stenosis, COPD, PUD, CKD3b w baseline Cr ~1.4, prior distal LLE DVT, w AC held 2/2 h/o PUD, recent admission for aspiration PNA and GERD (EGD not done 2/2 high risk), d/c on cefpodoxime, referred from [facility] for CP, SOB, hypoxia w SaO2 85% on RA, admitted for hypoxic respiratory failure.   While admitted, was treated w 7d course Cefepime/Levaquin completed 4/9/23 and completed prednisone taper 4/12/23. Noted to have abd pain 2/2 constipation/fecal impaction w mesenteric doppler and VQ scan neg. Constipation has resolved w aggressive bowel regimen including golytely. Was off AC due to bleed risk given h/o PUD but now restarted on Lovenox 50mg BID w/o evidence of re-bleeding.       #Acute Hypoxic respiratory failure / multifactorial dyspnea  #suspected PNA - treated   #diastolic CHF/ MS   #COPD in active smoker   - CT angio: no PE but B/L lower lobe and R middle lobe consolidations, recent V/Q scan has low probability of PE   - RVP, strep, legionella, bcx and MRSA negative, procal 0.14  - s/p 7d course of cefepime and levaquin, completed 4/9, no more Abx recommended by ID   - prednisone taper complete on 4/12/23  - started fluid restriction 1200 ml in 24 hours   - I&O monitoring, daily weight, incentive spirometry, maintain fluid balance  - supportive care, monitor pulse Ox, taper off oxygen if tolerated w SpO2 goal 88-92%     # Dyspepsia/ Peptic Ulcer Disease/Hiatal hernia  # Constipation - resolved   - GI consult: high risk for endoscopy, c/w conservative management   - Cont PPI IV BID, c/w carafate 1g BID  - mesenteric doppler is negative for stenosis   - tramadol for pain control  - bowel regimen miralax, senna, tap water enema q8 prn, lactulose prn  - Received Golytely 1L for severe constipation - 4/17/23 passing BMs     #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   - HCTZ on hold  - 4/17/23 d/c salt restriction on diet     #CKD3b   - Cr baseline ~1.4, currently at baseline  - holding lisinopril     #HTN  - c/w amlodipine 2.5 mg daily  - holding HCTZ and lisinopril     #DM  - takes pioglitazone 15mg daily, A1c 7.4% (3/2023)  - c/w insulin lantus 10U at bedtime, lispro 3U TID; monitor FS   DNR DNI 89 y/o woman w PMHx of HTN, HLD, DM, HFpEF, mitral stenosis, COPD, PUD, CKD3b w baseline Cr ~1.4, prior distal LLE DVT, w AC held 2/2 h/o PUD, recent admission for aspiration PNA and GERD (EGD not done 2/2 high risk), d/c on cefpodoxime, referred from [facility] for CP, SOB, hypoxia w SaO2 85% on RA, admitted for hypoxic respiratory failure.   While admitted, was treated w 7d course Cefepime/Levaquin completed 4/9/23 and completed prednisone taper 4/12/23. Noted to have abd pain 2/2 constipation/fecal impaction w mesenteric doppler and VQ scan neg.   Constipation resolved w aggressive bowel regimen including Golytely.     #Acute Hypoxic respiratory failure / multifactorial dyspnea  #suspected PNA - treated w 7d cefepime/levaquin  #diastolic CHF/ MS   #COPD in active smoker   - CT angio: no PE but B/L lower lobe and R middle lobe consolidations, recent V/Q scan has low probability of PE   - RVP, strep, legionella, bcx and MRSA negative, procal 0.14  - s/p 7d course of cefepime and levaquin, completed 4/9, no more Abx recommended by ID   - prednisone taper complete on 4/12/23  - started fluid restriction 1200 ml  - I&O monitoring, daily weight, incentive spirometry, maintain fluid balance  - supportive care, monitor pulse Ox, taper off oxygen if tolerated w SpO2 goal 88-92%     # Dyspepsia/ Peptic Ulcer Disease/Hiatal hernia  # Constipation - resolved   - GI consult: high risk for endoscopy, c/w conservative management   - Cont PPI IV BID, c/w carafate 1g BID  - mesenteric doppler is negative for stenosis   - tramadol for pain control   - bowel regimen miralax, senna, tap water enema q8 prn, lactulose prn  - Received Golytely 1L for severe constipation - 4/17/23 passing BMs     #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 Currently only on ppx dose heparin.     # hyponatremia   - stable low 130s   - HCTZ on hold  - 4/17/23 d/c salt restriction on diet     #CKD3b   - Cr baseline ~1.4, currently at baseline  - holding lisinopril     #HTN  - c/w amlodipine 2.5 mg daily  - holding HCTZ and lisinopril     #DM  - takes pioglitazone 15mg daily, A1c 7.4% (3/2023)  - c/w insulin lantus 10U at bedtime, lispro 3U TID; monitor FS   DNR DNI 89 y/o woman w PMHx of HTN, HLD, DM, HFpEF, mitral stenosis, COPD, PUD, CKD3b w baseline Cr ~1.4, prior distal LLE DVT, w AC held 2/2 h/o PUD, recent admission for aspiration PNA and GERD (EGD not done 2/2 high risk), d/c on cefpodoxime, referred from [facility] for CP, SOB, hypoxia w SaO2 85% on RA, admitted for hypoxic respiratory failure.   While admitted, was treated w 7d course Cefepime/Levaquin completed 4/9/23 and completed prednisone taper 4/12/23. Noted to have abd pain 2/2 constipation/fecal impaction w mesenteric doppler and VQ scan neg.   Constipation resolved w aggressive bowel regimen including Golytely.     #Acute Hypoxic respiratory failure / multifactorial dyspnea  #suspected PNA - s/p 7d cefepime/levaquin  #diastolic CHF/ MS   #COPD in active smoker   - CT angio: no PE but B/L lower lobe and R middle lobe consolidations, recent V/Q scan has low probability of PE   - RVP, strep, legionella, bcx and MRSA negative, procal 0.14  - s/p 7d course of cefepime and levaquin, completed 4/9, no more Abx recommended by ID   - prednisone taper completed on 4/12/23  - started fluid restriction 1200 ml  - I&O monitoring, daily weight, incentive spirometry, maintain fluid balance  - supportive care, monitor pulse Ox, taper off oxygen if tolerated w SpO2 goal 88-92%     # Dyspepsia/ Peptic Ulcer Disease/Hiatal hernia  # Constipation - resolved   - GI consult: high risk for endoscopy, c/w conservative management   - Cont PPI IV BID, c/w carafate 1g BID  - mesenteric doppler is negative for stenosis   - pain control PRN   - bowel regimen miralax, senna, tap water enema q8 prn, lactulose prn. 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 Currently only on ppx dose heparin.     # hyponatremia   - stable low 130s   - HCTZ on hold  - 4/17/23 d/c salt restriction on diet     #CKD3b   - Cr baseline ~1.4, currently at baseline  - holding lisinopril     #HTN  - c/w amlodipine 2.5 mg daily  - holding HCTZ and lisinopril     #DM  - takes pioglitazone 15mg daily, A1c 7.4% (3/2023)  - c/w insulin lantus 10U at bedtime, lispro 3U TID; monitor FS

## 2023-04-10 NOTE — PROGRESS NOTE ADULT - SUBJECTIVE AND OBJECTIVE BOX
No acute events overnight. She is very hard of hearing. Complaining of subxiphoid pain since last night but did not mention it to anybody. Pain is reproducible by palpation. She denies any headaches, dizziness, nausea, vomiting, shortness of breath, palpitations, abdominal pain, diarrhea, constipation, difficulty urinating, or urinary incontinence.     VITAL SIGNS:  T(C): 37.3 (04-10-23 @ 04:12), Max: 37.3 (04-10-23 @ 04:12)  HR: 92 (04-10-23 @ 04:12) (92 - 112)  BP: 141/65 (04-10-23 @ 04:12) (124/59 - 141/65)  RR: 19 (04-10-23 @ 04:12) (18 - 19)  SpO2: 98% (04-10-23 @ 11:16) (97% - 99%)      PHYSICAL EXAM:  GENERAL: NAD, hard of hearing   HEAD: Atraumatic, Normocephalic  NECK: Supple  NERVOUS SYSTEM: Answers questions appropriately, Good concentration  CHEST/LUNG: Clear to auscultation bilaterally; No rales, rhonchi, wheezing, or rubs. Subxiphoid TTP  HEART: Regular rate and rhythm. Normal S1/S1. No murmurs, rubs, or gallops  ABDOMEN: Soft, Nontender, Nondistended; Bowel sounds present  EXTREMITIES: no ble edema      MEDICATIONS:  MEDICATIONS  (STANDING):  albuterol/ipratropium for Nebulization 3 milliLiter(s) Nebulizer every 12 hours  amLODIPine   Tablet 2.5 milliGRAM(s) Oral daily  atorvastatin 20 milliGRAM(s) Oral at bedtime  budesonide  80 MICROgram(s)/formoterol 4.5 MICROgram(s) Inhaler 2 Puff(s) Inhalation two times a day  chlorhexidine 2% Cloths 1 Application(s) Topical <User Schedule>  dextrose 5%. 1000 milliLiter(s) (50 mL/Hr) IV Continuous <Continuous>  dextrose 5%. 1000 milliLiter(s) (100 mL/Hr) IV Continuous <Continuous>  dextrose 50% Injectable 25 Gram(s) IV Push once  dextrose 50% Injectable 12.5 Gram(s) IV Push once  dextrose 50% Injectable 25 Gram(s) IV Push once  glucagon  Injectable 1 milliGRAM(s) IntraMuscular once  heparin   Injectable 5000 Unit(s) SubCutaneous every 8 hours  insulin glargine Injectable (LANTUS) 10 Unit(s) SubCutaneous every morning  insulin lispro (ADMELOG) corrective regimen sliding scale   SubCutaneous three times a day before meals  insulin lispro (ADMELOG) corrective regimen sliding scale   SubCutaneous at bedtime  insulin lispro Injectable (ADMELOG) 3 Unit(s) SubCutaneous three times a day before meals  melatonin 5 milliGRAM(s) Oral at bedtime  pantoprazole    Tablet 40 milliGRAM(s) Oral two times a day  predniSONE   Tablet 10 milliGRAM(s) Oral daily  senna 2 Tablet(s) Oral at bedtime  sucralfate 1 Gram(s) Oral two times a day    MEDICATIONS  (PRN):  acetaminophen     Tablet .. 650 milliGRAM(s) Oral every 6 hours PRN Moderate Pain (4 - 6)  albuterol/ipratropium for Nebulization 3 milliLiter(s) Nebulizer every 4 hours PRN Shortness of Breath and/or Wheezing  aluminum hydroxide/magnesium hydroxide/simethicone Suspension 30 milliLiter(s) Oral every 4 hours PRN Dyspepsia  dextrose Oral Gel 15 Gram(s) Oral once PRN Blood Glucose LESS THAN 70 milliGRAM(s)/deciliter  polyethylene glycol 3350 17 Gram(s) Oral daily PRN Constipation      LABS:                        9.0    8.12  )-----------( 551      ( 10 Apr 2023 08:26 )             29.0     04-10    133<L>  |  98  |  68<HH>  ----------------------------<  150<H>  5.4<H>   |  22  |  1.7<H>    Ca    9.4      10 Apr 2023 08:26  Mg     2.0     04-10

## 2023-04-10 NOTE — DISCHARGE NOTE PROVIDER - CARE PROVIDERS DIRECT ADDRESSES
[email or web address]

## 2023-04-10 NOTE — DISCHARGE NOTE PROVIDER - NSDCMRMEDTOKEN_GEN_ALL_CORE_FT
He is very overdue for labs and this will not be refilled again without being seen amLODIPine 2.5 mg oral tablet: 1 tab(s) orally once a day  budesonide-formoterol 80 mcg-4.5 mcg/inh inhalation aerosol: 1 puff(s) inhaled 2 times a day  Lipitor 20 mg oral tablet: 1 tab(s) orally once a day  pioglitazone 15 mg oral tablet: 1 tab(s) orally once a day  Protonix 40 mg oral delayed release tablet: 1 tab(s) orally 2 times a day  sucralfate 1 g oral tablet: 1 tab(s) orally every 6 hours as needed for  indigestion   amLODIPine 2.5 mg oral tablet: 1 tab(s) orally once a day  budesonide-formoterol 80 mcg-4.5 mcg/inh inhalation aerosol: 1 puff(s) inhaled 2 times a day  folic acid 1 mg oral tablet: 1 tab(s) orally once a day  ipratropium-albuterol 0.5 mg-2.5 mg/3 mL inhalation solution: 3 milliliter(s) inhaled every 12 hours  Lipitor 20 mg oral tablet: 1 tab(s) orally once a day  pioglitazone 15 mg oral tablet: 1 tab(s) orally once a day  polyethylene glycol 3350 oral powder for reconstitution: 17 gram(s) orally  Protonix 40 mg oral delayed release tablet: 1 tab(s) orally 2 times a day  senna leaf extract oral tablet: 2 tab(s) orally every 12 hours  sucralfate 1 g oral tablet: 1 tab(s) orally every 6 hours as needed for  indigestion   albuterol 90 mcg/inh inhalation aerosol: 1 puff(s) inhaled every 6 hours as needed for Shortness of Breath and/or Wheezing 1 puff  amLODIPine 2.5 mg oral tablet: 1 tab(s) orally once a day  budesonide-formoterol 80 mcg-4.5 mcg/inh inhalation aerosol: 1 puff(s) inhaled 2 times a day  folic acid 1 mg oral tablet: 1 tab(s) orally once a day  ipratropium-albuterol 0.5 mg-2.5 mg/3 mL inhalation solution: 3 milliliter(s) inhaled every 12 hours as needed for  shortness of breath and/or wheezing  Lipitor 20 mg oral tablet: 1 tab(s) orally once a day (at bedtime)  pioglitazone 15 mg oral tablet: 1 tab(s) orally once a day  polyethylene glycol 3350 oral powder for reconstitution: 17 gram(s) orally once a day as needed for  constipation  Protonix 40 mg oral delayed release tablet: 1 tab(s) orally 2 times a day  senna leaf extract oral tablet: 2 tab(s) orally every 12 hours as needed for  constipation  sucralfate 1 g oral tablet: 1 tab(s) orally every 6 hours as needed for  indigestion

## 2023-04-10 NOTE — DISCHARGE NOTE PROVIDER - NSDCFUADDINST_GEN_ALL_CORE_FT
you had aspiration pneumonia. you completed a 7 day course of antibiotics on 4/9/23. you were also on steroids. After discharge you must follow up with your pcp

## 2023-04-10 NOTE — DISCHARGE NOTE PROVIDER - CARE PROVIDER_API CALL
Harley Colunga)  [address]  [address] [place] [address]  Phone: [phone]  Fax: [phone]  Follow Up Time: 1 week

## 2023-04-11 LAB
ANION GAP SERPL CALC-SCNC: 9 MMOL/L — SIGNIFICANT CHANGE UP (ref 7–14)
BUN SERPL-MCNC: 67 MG/DL — CRITICAL HIGH (ref 10–20)
CALCIUM SERPL-MCNC: 9.7 MG/DL — SIGNIFICANT CHANGE UP (ref 8.4–10.5)
CHLORIDE SERPL-SCNC: 101 MMOL/L — SIGNIFICANT CHANGE UP (ref 98–110)
CO2 SERPL-SCNC: 25 MMOL/L — SIGNIFICANT CHANGE UP (ref 17–32)
CREAT SERPL-MCNC: 1.6 MG/DL — HIGH (ref 0.7–1.5)
EGFR: 30 ML/MIN/1.73M2 — LOW
GLUCOSE BLDC GLUCOMTR-MCNC: 124 MG/DL — HIGH (ref 70–99)
GLUCOSE BLDC GLUCOMTR-MCNC: 135 MG/DL — HIGH (ref 70–99)
GLUCOSE BLDC GLUCOMTR-MCNC: 149 MG/DL — HIGH (ref 70–99)
GLUCOSE BLDC GLUCOMTR-MCNC: 66 MG/DL — LOW (ref 70–99)
GLUCOSE BLDC GLUCOMTR-MCNC: 96 MG/DL — SIGNIFICANT CHANGE UP (ref 70–99)
GLUCOSE SERPL-MCNC: 114 MG/DL — HIGH (ref 70–99)
HCT VFR BLD CALC: 29.6 % — LOW (ref 37–47)
HGB BLD-MCNC: 9.2 G/DL — LOW (ref 12–16)
LACTATE SERPL-SCNC: 1.3 MMOL/L — SIGNIFICANT CHANGE UP (ref 0.7–2)
LACTATE SERPL-SCNC: 1.3 MMOL/L — SIGNIFICANT CHANGE UP (ref 0.7–2)
MAGNESIUM SERPL-MCNC: 1.8 MG/DL — SIGNIFICANT CHANGE UP (ref 1.8–2.4)
MCHC RBC-ENTMCNC: 28 PG — SIGNIFICANT CHANGE UP (ref 27–31)
MCHC RBC-ENTMCNC: 31.1 G/DL — LOW (ref 32–37)
MCV RBC AUTO: 90 FL — SIGNIFICANT CHANGE UP (ref 81–99)
NRBC # BLD: 0 /100 WBCS — SIGNIFICANT CHANGE UP (ref 0–0)
PLATELET # BLD AUTO: 567 K/UL — HIGH (ref 130–400)
PMV BLD: 9.6 FL — SIGNIFICANT CHANGE UP (ref 7.4–10.4)
POTASSIUM SERPL-MCNC: 5.8 MMOL/L — HIGH (ref 3.5–5)
POTASSIUM SERPL-SCNC: 5.8 MMOL/L — HIGH (ref 3.5–5)
RBC # BLD: 3.29 M/UL — LOW (ref 4.2–5.4)
RBC # FLD: 16.8 % — HIGH (ref 11.5–14.5)
SODIUM SERPL-SCNC: 135 MMOL/L — SIGNIFICANT CHANGE UP (ref 135–146)
TROPONIN T SERPL-MCNC: 0.01 NG/ML — SIGNIFICANT CHANGE UP
WBC # BLD: 12.83 K/UL — HIGH (ref 4.8–10.8)
WBC # FLD AUTO: 12.83 K/UL — HIGH (ref 4.8–10.8)

## 2023-04-11 PROCEDURE — 74018 RADEX ABDOMEN 1 VIEW: CPT | Mod: 26

## 2023-04-11 PROCEDURE — 99232 SBSQ HOSP IP/OBS MODERATE 35: CPT

## 2023-04-11 PROCEDURE — 93010 ELECTROCARDIOGRAM REPORT: CPT

## 2023-04-11 RX ORDER — MORPHINE SULFATE 50 MG/1
2 CAPSULE, EXTENDED RELEASE ORAL ONCE
Refills: 0 | Status: DISCONTINUED | OUTPATIENT
Start: 2023-04-11 | End: 2023-04-11

## 2023-04-11 RX ORDER — LACTULOSE 10 G/15ML
20 SOLUTION ORAL EVERY 4 HOURS
Refills: 0 | Status: COMPLETED | OUTPATIENT
Start: 2023-04-11 | End: 2023-04-11

## 2023-04-11 RX ORDER — MORPHINE SULFATE 50 MG/1
1 CAPSULE, EXTENDED RELEASE ORAL ONCE
Refills: 0 | Status: DISCONTINUED | OUTPATIENT
Start: 2023-04-11 | End: 2023-04-11

## 2023-04-11 RX ORDER — SODIUM ZIRCONIUM CYCLOSILICATE 10 G/10G
10 POWDER, FOR SUSPENSION ORAL ONCE
Refills: 0 | Status: COMPLETED | OUTPATIENT
Start: 2023-04-11 | End: 2023-04-11

## 2023-04-11 RX ADMIN — Medication 5 MILLIGRAM(S): at 21:01

## 2023-04-11 RX ADMIN — LACTULOSE 20 GRAM(S): 10 SOLUTION ORAL at 16:36

## 2023-04-11 RX ADMIN — LACTULOSE 20 GRAM(S): 10 SOLUTION ORAL at 21:02

## 2023-04-11 RX ADMIN — SODIUM ZIRCONIUM CYCLOSILICATE 10 GRAM(S): 10 POWDER, FOR SUSPENSION ORAL at 12:40

## 2023-04-11 RX ADMIN — MORPHINE SULFATE 1 MILLIGRAM(S): 50 CAPSULE, EXTENDED RELEASE ORAL at 05:14

## 2023-04-11 RX ADMIN — HEPARIN SODIUM 5000 UNIT(S): 5000 INJECTION INTRAVENOUS; SUBCUTANEOUS at 16:37

## 2023-04-11 RX ADMIN — Medication 3 UNIT(S): at 08:12

## 2023-04-11 RX ADMIN — ATORVASTATIN CALCIUM 20 MILLIGRAM(S): 80 TABLET, FILM COATED ORAL at 21:01

## 2023-04-11 RX ADMIN — Medication 1 GRAM(S): at 19:31

## 2023-04-11 RX ADMIN — MORPHINE SULFATE 2 MILLIGRAM(S): 50 CAPSULE, EXTENDED RELEASE ORAL at 20:40

## 2023-04-11 RX ADMIN — INSULIN GLARGINE 10 UNIT(S): 100 INJECTION, SOLUTION SUBCUTANEOUS at 12:39

## 2023-04-11 RX ADMIN — Medication 3 UNIT(S): at 13:56

## 2023-04-11 RX ADMIN — PANTOPRAZOLE SODIUM 40 MILLIGRAM(S): 20 TABLET, DELAYED RELEASE ORAL at 19:31

## 2023-04-11 RX ADMIN — Medication 3 MILLILITER(S): at 07:51

## 2023-04-11 RX ADMIN — HEPARIN SODIUM 5000 UNIT(S): 5000 INJECTION INTRAVENOUS; SUBCUTANEOUS at 05:15

## 2023-04-11 RX ADMIN — Medication 1 GRAM(S): at 05:15

## 2023-04-11 RX ADMIN — CHLORHEXIDINE GLUCONATE 1 APPLICATION(S): 213 SOLUTION TOPICAL at 06:38

## 2023-04-11 RX ADMIN — Medication 0: at 08:12

## 2023-04-11 RX ADMIN — PANTOPRAZOLE SODIUM 40 MILLIGRAM(S): 20 TABLET, DELAYED RELEASE ORAL at 05:15

## 2023-04-11 RX ADMIN — Medication 10 MILLIGRAM(S): at 05:15

## 2023-04-11 RX ADMIN — HEPARIN SODIUM 5000 UNIT(S): 5000 INJECTION INTRAVENOUS; SUBCUTANEOUS at 21:02

## 2023-04-11 RX ADMIN — AMLODIPINE BESYLATE 2.5 MILLIGRAM(S): 2.5 TABLET ORAL at 05:15

## 2023-04-11 RX ADMIN — SENNA PLUS 2 TABLET(S): 8.6 TABLET ORAL at 21:01

## 2023-04-11 RX ADMIN — Medication 650 MILLIGRAM(S): at 20:39

## 2023-04-11 NOTE — PROGRESS NOTE ADULT - ATTENDING COMMENTS
Patient seen at bedside. Changes made within note as well. Discussed with medical team that includes resident(s), medical student(s), nursing staff, case management.     89 year old female with PMHx of HTN, DM, CKD, active smoker, COPD not on home O2 recent admission for aspiration PNA with incidental finding of distal DVT presented to the ED for hypoxia    #Acute Hypoxic respiratory failure likely 2/2 asp PNA/Bilateral with sepsis POA  - CT angio: no PE but B/L lower lobe and R middle lobe consolidations   - COPD not on home 02  - Active smoker   - Recent Distal DVT  - intermittently on 2 LNC  - RVP, strep, legionella, bcx and MRSA negative   - procal 0.14  - s/p 7d course of cefepime and levaquin, completed 4/9  - c/w with prednisone taper, prednisone 10mg to complete on 4/12  - echo ef 55%, GIDD, mod MS, mild AS   - diet per speech and swallow   - PT  - [facility] CM  - pulse ox 98% on RA was put back on 2 liters   patient not willing to ambulate to ambulatory oxygen was not checked     #LLE distal DVT   - Duplex (3/24/23) noted thrombus in left gastrocnemius and peroneal veins  - repeat duplex unchanged, was seen by vascular on recent admission  - was started on Eliquis given pt is not ambulating and remains on bed but pt has hx of PUD, and c/o of intermittent epigastric burn/ discomfort, would be at risk of GIB, daughter Meena Powell (at bedside) decided to hold on AC    # hyponatremia - stable  - HCTZ on hold    #Dyspepsia  #hiatal hernia  #Peptic Ulcer Disease  - c/w protonix BID and carafate   patient reported intermittent epigastric pain   as per family who was present at bedside mentioned that patient was suppose to have EGD done as outpatient  also reported that prior history of hiatal hernia?  consulted GI, will follow up recommendations     #CKD III  - cr at baseline  - keep off lisinopril   - was following with Nephrology in [place]  - encourage PO intake    #HTN  - was on amlodipine 2.5 mg daily, HCTZ 12.5mg daily and lisinopril 10mg daily in assisted living   - holding HCTZ and lisinopril     #DM  - takes pioglitazone 15mg daily  - c/w insulin lantus 10U at bedtime, lispro 3U TID; monitor FS  - A1c 7.4% (3/2023)    DVT pp: heparin  GI ppx: protonix  Diet: Soft and bite sized/DASH/CC  Code: DNR/DNI  Pending: [facility] to [facility] in [facility] per CM, , monitor oxygenation, if patient requires oxygen need to arrange oxygen before discharge ., follow GI

## 2023-04-11 NOTE — PROGRESS NOTE ADULT - SUBJECTIVE AND OBJECTIVE BOX
Overnight continued to complain about chest/epigastric/LUQ pain. Pain not relieved by pain medications. Otherwise no other complaints.     VITAL SIGNS:  T(C): 36.6 (04-11-23 @ 12:00), Max: 36.6 (04-11-23 @ 12:00)  HR: 77 (04-11-23 @ 12:00) (77 - 103)  BP: 105/57 (04-11-23 @ 12:00) (105/57 - 150/81)  RR: 18 (04-11-23 @ 12:00) (18 - 18)  SpO2: 94% (04-11-23 @ 12:00) (94% - 98%)      PHYSICAL EXAM:  GENERAL: NAD, hard of hearing   HEAD: Atraumatic, Normocephalic  NECK: Supple  NERVOUS SYSTEM: Answers questions appropriately, Good concentration  CHEST/LUNG: Clear to auscultation bilaterally; No rales, rhonchi, wheezing, or rubs  HEART: Regular rate and rhythm. Normal S1/S1. No murmurs, rubs, or gallops  ABDOMEN: TTP subxiphoid, epigastric, under left breast. Soft, Nondistended; Bowel sounds present  EXTREMITIES: no ble edema      MEDICATIONS:  MEDICATIONS  (STANDING):  albuterol/ipratropium for Nebulization 3 milliLiter(s) Nebulizer every 12 hours  amLODIPine   Tablet 2.5 milliGRAM(s) Oral daily  atorvastatin 20 milliGRAM(s) Oral at bedtime  budesonide  80 MICROgram(s)/formoterol 4.5 MICROgram(s) Inhaler 2 Puff(s) Inhalation two times a day  chlorhexidine 2% Cloths 1 Application(s) Topical <User Schedule>  dextrose 5%. 1000 milliLiter(s) (50 mL/Hr) IV Continuous <Continuous>  dextrose 5%. 1000 milliLiter(s) (100 mL/Hr) IV Continuous <Continuous>  dextrose 50% Injectable 25 Gram(s) IV Push once  dextrose 50% Injectable 12.5 Gram(s) IV Push once  dextrose 50% Injectable 25 Gram(s) IV Push once  glucagon  Injectable 1 milliGRAM(s) IntraMuscular once  heparin   Injectable 5000 Unit(s) SubCutaneous every 8 hours  insulin glargine Injectable (LANTUS) 10 Unit(s) SubCutaneous every morning  insulin lispro (ADMELOG) corrective regimen sliding scale   SubCutaneous at bedtime  insulin lispro (ADMELOG) corrective regimen sliding scale   SubCutaneous three times a day before meals  insulin lispro Injectable (ADMELOG) 3 Unit(s) SubCutaneous three times a day before meals  melatonin 5 milliGRAM(s) Oral at bedtime  pantoprazole    Tablet 40 milliGRAM(s) Oral two times a day  predniSONE   Tablet 10 milliGRAM(s) Oral daily  senna 2 Tablet(s) Oral at bedtime  sucralfate 1 Gram(s) Oral two times a day    MEDICATIONS  (PRN):  acetaminophen     Tablet .. 650 milliGRAM(s) Oral every 6 hours PRN Moderate Pain (4 - 6)  albuterol/ipratropium for Nebulization 3 milliLiter(s) Nebulizer every 4 hours PRN Shortness of Breath and/or Wheezing  aluminum hydroxide/magnesium hydroxide/simethicone Suspension 30 milliLiter(s) Oral every 4 hours PRN Dyspepsia  dextrose Oral Gel 15 Gram(s) Oral once PRN Blood Glucose LESS THAN 70 milliGRAM(s)/deciliter  polyethylene glycol 3350 17 Gram(s) Oral daily PRN Constipation      LABS:                        9.2    12.83 )-----------( 567      ( 11 Apr 2023 07:09 )             29.6     04-11    135  |  101  |  67<HH>  ----------------------------<  114<H>  5.8<H>   |  25  |  1.6<H>    Ca    9.7      11 Apr 2023 07:09  Mg     1.8     04-11

## 2023-04-11 NOTE — PROGRESS NOTE ADULT - ASSESSMENT
89 year old female with PMHx of HTN, DM, CKD, active smoker, COPD not on home O2 recent admission for aspiration PNA with incidental finding of distal DVT presented to the ED for hypoxia    #Acute Hypoxic respiratory failure likely 2/2 asp PNA/Bilateral with sepsis POA  - CT angio: no PE but B/L lower lobe and R middle lobe consolidations   - COPD not on home 02  - Active smoker   - Recent Distal DVT  - intermittently on 2 LNC  - RVP, strep, legionella, bcx and MRSA negative   - procal 0.14  - s/p 7d course of cefepime and levaquin, completed 4/9  - c/w with prednisone taper, prednisone 10mg to complete on 4/12  - echo ef 55%, GIDD, mod MS, mild AS   - diet per speech and swallow   - PT  - [facility]  - pulse ox 98% on RA    #Chest/epigastric/LUQ pain  - TTP  - KUB: copious stool seen throughout the colon. nonobstructive bowel gas pattern. suggestion of contrast in the stomach  - f/u troponin  - f/u lactate    #LLE distal DVT   - Duplex (3/24/23) noted thrombus in left gastrocnemius and peroneal veins  - repeat duplex unchanged, was seen by vascular on recent admission  - was started on Eliquis given pt is not ambulating and remains on bed but pt has hx of PUD, and c/o of intermittent epigastric burn/ discomfort, would be at risk of GIB, daughter Meena Powell (at bedside) decided to hold on AC    # hyponatremia - stable  - HCTZ on hold    #Dyspepsia  #Peptic Ulcer Disease  - c/w protonix BID and carafate     #CKD III  - cr at baseline  - keep off lisinopril   - was following with Nephrology in [place]  - encourage PO intake    #HTN  - was on amlodipine 2.5 mg daily, HCTZ 12.5mg daily and lisinopril 10mg daily in assisted living   - holding HCTZ and lisinopril     #DM  - takes pioglitazone 15mg daily  - c/w insulin lantus 10U at bedtime, lispro 3U TID; monitor FS  - A1c 7.4% (3/2023)    DVT pp: heparin  GI ppx: protonix  Diet: Soft and bite sized/DASH/CC  Code: DNR/DNI  Pending: [facility] able 89 year old female with PMHx of HTN, DM, CKD, active smoker, COPD not on home O2 recent admission for aspiration PNA with incidental finding of distal DVT presented to the ED for hypoxia    #Acute Hypoxic respiratory failure likely 2/2 asp PNA/Bilateral with sepsis POA  - CT angio: no PE but B/L lower lobe and R middle lobe consolidations   - COPD not on home 02  - Active smoker   - Recent Distal DVT  - intermittently on 2 LNC  - RVP, strep, legionella, bcx and MRSA negative   - procal 0.14  - s/p 7d course of cefepime and levaquin, completed 4/9  - c/w with prednisone taper, prednisone 10mg to complete on 4/12  - echo ef 55%, GIDD, mod MS, mild AS   - diet per speech and swallow   - PT  - [facility] CM  - pulse ox 98% on RA    #Chest/epigastric/LUQ pain  - TTP  - KUB: copious stool seen throughout the colon. nonobstructive bowel gas pattern. suggestion of contrast in the stomach  - troponin 0.01  - f/u lactate    #LLE distal DVT   - Duplex (3/24/23) noted thrombus in left gastrocnemius and peroneal veins  - repeat duplex unchanged, was seen by vascular on recent admission  - was started on Eliquis given pt is not ambulating and remains on bed but pt has hx of PUD, and c/o of intermittent epigastric burn/ discomfort, would be at risk of GIB, daughter Meena Powell (at bedside) decided to hold on AC    # hyponatremia - stable  - HCTZ on hold    #Dyspepsia  #Peptic Ulcer Disease  - c/w protonix BID and carafate     #CKD III  - cr at baseline  - keep off lisinopril   - was following with Nephrology in [place]  - encourage PO intake    #HTN  - was on amlodipine 2.5 mg daily, HCTZ 12.5mg daily and lisinopril 10mg daily in assisted living   - holding HCTZ and lisinopril     #DM  - takes pioglitazone 15mg daily  - c/w insulin lantus 10U at bedtime, lispro 3U TID; monitor FS  - A1c 7.4% (3/2023)    DVT pp: heparin  GI ppx: protonix  Diet: Soft and bite sized/DASH/CC  Code: DNR/DNI  Pending: [facility] able 89 year old female with PMHx of HTN, DM, CKD, active smoker, COPD not on home O2 recent admission for aspiration PNA with incidental finding of distal DVT presented to the ED for hypoxia    #Acute Hypoxic respiratory failure likely 2/2 asp PNA/Bilateral with sepsis POA  - CT angio: no PE but B/L lower lobe and R middle lobe consolidations   - COPD not on home 02  - Active smoker   - Recent Distal DVT  - intermittently on 2 LNC  - RVP, strep, legionella, bcx and MRSA negative   - procal 0.14  - s/p 7d course of cefepime and levaquin, completed 4/9  - c/w with prednisone taper, prednisone 10mg to complete on 4/12  - echo ef 55%, GIDD, mod MS, mild AS   - diet per speech and swallow   - PT  - [facility]  - pulse ox 98% on RA    #Chest/epigastric/LUQ pain  - TTP  - KUB: copious stool seen throughout the colon. nonobstructive bowel gas pattern. suggestion of contrast in the stomach  - troponin 0.01  - lactate 1.3  - bowel regimen     #LLE distal DVT   - Duplex (3/24/23) noted thrombus in left gastrocnemius and peroneal veins  - repeat duplex unchanged, was seen by vascular on recent admission  - was started on Eliquis given pt is not ambulating and remains on bed but pt has hx of PUD, and c/o of intermittent epigastric burn/ discomfort, would be at risk of GIB, daughter Meena Powell (at bedside) decided to hold on AC    # hyponatremia - stable  - HCTZ on hold    #Dyspepsia  #Peptic Ulcer Disease  - c/w protonix BID and carafate     #CKD III  - cr at baseline  - keep off lisinopril   - was following with Nephrology in [place]  - encourage PO intake    #HTN  - was on amlodipine 2.5 mg daily, HCTZ 12.5mg daily and lisinopril 10mg daily in assisted living   - holding HCTZ and lisinopril     #DM  - takes pioglitazone 15mg daily  - c/w insulin lantus 10U at bedtime, lispro 3U TID; monitor FS  - A1c 7.4% (3/2023)    DVT pp: heparin  GI ppx: protonix  Diet: Soft and bite sized/DASH/CC  Code: DNR/DNI  Pending: [facility] able 89 year old female with PMHx of HTN, DM, CKD, active smoker, COPD not on home O2 recent admission for aspiration PNA with incidental finding of distal DVT presented to the ED for hypoxia    #Acute Hypoxic respiratory failure likely 2/2 asp PNA/Bilateral with sepsis POA  - CT angio: no PE but B/L lower lobe and R middle lobe consolidations   - COPD not on home 02  - Active smoker   - Recent Distal DVT  - intermittently on 2 LNC  - RVP, strep, legionella, bcx and MRSA negative   - procal 0.14  - s/p 7d course of cefepime and levaquin, completed 4/9  - c/w with prednisone taper, prednisone 10mg to complete on 4/12  - echo ef 55%, GIDD, mod MS, mild AS   - diet per speech and swallow   - PT  - [facility]  - pulse ox 98% on RA    #Chest/epigastric/LUQ pain  - TTP  - KUB: copious stool seen throughout the colon. nonobstructive bowel gas pattern. suggestion of contrast in the stomach  - troponin 0.01  - lactate 1.3  - bowel regimen   - GI consult    #LLE distal DVT   - Duplex (3/24/23) noted thrombus in left gastrocnemius and peroneal veins  - repeat duplex unchanged, was seen by vascular on recent admission  - was started on Eliquis given pt is not ambulating and remains on bed but pt has hx of PUD, and c/o of intermittent epigastric burn/ discomfort, would be at risk of GIB, daughter Meena Powell (at bedside) decided to hold on AC    # hyponatremia - stable  - HCTZ on hold    #Dyspepsia  #Peptic Ulcer Disease  - c/w protonix BID and carafate     #CKD III  - cr at baseline  - keep off lisinopril   - was following with Nephrology in [place]  - encourage PO intake    #HTN  - was on amlodipine 2.5 mg daily, HCTZ 12.5mg daily and lisinopril 10mg daily in assisted living   - holding HCTZ and lisinopril     #DM  - takes pioglitazone 15mg daily  - c/w insulin lantus 10U at bedtime, lispro 3U TID; monitor FS  - A1c 7.4% (3/2023)    DVT pp: heparin  GI ppx: protonix  Diet: Soft and bite sized/DASH/CC  Code: DNR/DNI  Pending: [facility] when able

## 2023-04-12 LAB
ANION GAP SERPL CALC-SCNC: 10 MMOL/L — SIGNIFICANT CHANGE UP (ref 7–14)
BASE EXCESS BLDA CALC-SCNC: 0.3 MMOL/L — SIGNIFICANT CHANGE UP (ref -2–3)
BUN SERPL-MCNC: 55 MG/DL — HIGH (ref 10–20)
CALCIUM SERPL-MCNC: 9.4 MG/DL — SIGNIFICANT CHANGE UP (ref 8.4–10.5)
CHLORIDE SERPL-SCNC: 98 MMOL/L — SIGNIFICANT CHANGE UP (ref 98–110)
CO2 SERPL-SCNC: 24 MMOL/L — SIGNIFICANT CHANGE UP (ref 17–32)
CREAT SERPL-MCNC: 1.4 MG/DL — SIGNIFICANT CHANGE UP (ref 0.7–1.5)
EGFR: 36 ML/MIN/1.73M2 — LOW
GAS PNL BLDA: SIGNIFICANT CHANGE UP
GLUCOSE BLDC GLUCOMTR-MCNC: 137 MG/DL — HIGH (ref 70–99)
GLUCOSE BLDC GLUCOMTR-MCNC: 212 MG/DL — HIGH (ref 70–99)
GLUCOSE BLDC GLUCOMTR-MCNC: 238 MG/DL — HIGH (ref 70–99)
GLUCOSE BLDC GLUCOMTR-MCNC: 279 MG/DL — HIGH (ref 70–99)
GLUCOSE SERPL-MCNC: 123 MG/DL — HIGH (ref 70–99)
HCO3 BLDA-SCNC: 25 MMOL/L — SIGNIFICANT CHANGE UP (ref 21–28)
HCT VFR BLD CALC: 30.3 % — LOW (ref 37–47)
HGB BLD-MCNC: 9.5 G/DL — LOW (ref 12–16)
MAGNESIUM SERPL-MCNC: 1.7 MG/DL — LOW (ref 1.8–2.4)
MCHC RBC-ENTMCNC: 27.9 PG — SIGNIFICANT CHANGE UP (ref 27–31)
MCHC RBC-ENTMCNC: 31.4 G/DL — LOW (ref 32–37)
MCV RBC AUTO: 89.1 FL — SIGNIFICANT CHANGE UP (ref 81–99)
NRBC # BLD: 0 /100 WBCS — SIGNIFICANT CHANGE UP (ref 0–0)
PCO2 BLDA: 38 MMHG — SIGNIFICANT CHANGE UP (ref 25–48)
PH BLDA: 7.42 — SIGNIFICANT CHANGE UP (ref 7.35–7.45)
PLATELET # BLD AUTO: 546 K/UL — HIGH (ref 130–400)
PMV BLD: 9.4 FL — SIGNIFICANT CHANGE UP (ref 7.4–10.4)
PO2 BLDA: 91 MMHG — SIGNIFICANT CHANGE UP (ref 83–108)
POTASSIUM SERPL-MCNC: 4.7 MMOL/L — SIGNIFICANT CHANGE UP (ref 3.5–5)
POTASSIUM SERPL-SCNC: 4.7 MMOL/L — SIGNIFICANT CHANGE UP (ref 3.5–5)
RBC # BLD: 3.4 M/UL — LOW (ref 4.2–5.4)
RBC # FLD: 16.9 % — HIGH (ref 11.5–14.5)
SAO2 % BLDA: 97.4 % — SIGNIFICANT CHANGE UP (ref 94–98)
SODIUM SERPL-SCNC: 132 MMOL/L — LOW (ref 135–146)
WBC # BLD: 8.77 K/UL — SIGNIFICANT CHANGE UP (ref 4.8–10.8)
WBC # FLD AUTO: 8.77 K/UL — SIGNIFICANT CHANGE UP (ref 4.8–10.8)

## 2023-04-12 PROCEDURE — 99232 SBSQ HOSP IP/OBS MODERATE 35: CPT

## 2023-04-12 PROCEDURE — 99223 1ST HOSP IP/OBS HIGH 75: CPT

## 2023-04-12 RX ORDER — HYDROMORPHONE HYDROCHLORIDE 2 MG/ML
0.25 INJECTION INTRAMUSCULAR; INTRAVENOUS; SUBCUTANEOUS ONCE
Refills: 0 | Status: DISCONTINUED | OUTPATIENT
Start: 2023-04-12 | End: 2023-04-12

## 2023-04-12 RX ORDER — MAGNESIUM SULFATE 500 MG/ML
2 VIAL (ML) INJECTION ONCE
Refills: 0 | Status: COMPLETED | OUTPATIENT
Start: 2023-04-12 | End: 2023-04-12

## 2023-04-12 RX ORDER — HYDROMORPHONE HYDROCHLORIDE 2 MG/ML
0.25 INJECTION INTRAMUSCULAR; INTRAVENOUS; SUBCUTANEOUS ONCE
Refills: 0 | Status: DISCONTINUED | OUTPATIENT
Start: 2023-04-12 | End: 2023-04-13

## 2023-04-12 RX ORDER — LACTULOSE 10 G/15ML
20 SOLUTION ORAL EVERY 4 HOURS
Refills: 0 | Status: COMPLETED | OUTPATIENT
Start: 2023-04-12 | End: 2023-04-12

## 2023-04-12 RX ADMIN — Medication 3 UNIT(S): at 12:34

## 2023-04-12 RX ADMIN — HEPARIN SODIUM 5000 UNIT(S): 5000 INJECTION INTRAVENOUS; SUBCUTANEOUS at 05:27

## 2023-04-12 RX ADMIN — HEPARIN SODIUM 5000 UNIT(S): 5000 INJECTION INTRAVENOUS; SUBCUTANEOUS at 22:01

## 2023-04-12 RX ADMIN — Medication 650 MILLIGRAM(S): at 19:36

## 2023-04-12 RX ADMIN — Medication 3 UNIT(S): at 17:42

## 2023-04-12 RX ADMIN — Medication 2: at 17:42

## 2023-04-12 RX ADMIN — Medication 10 MILLIGRAM(S): at 05:27

## 2023-04-12 RX ADMIN — CHLORHEXIDINE GLUCONATE 1 APPLICATION(S): 213 SOLUTION TOPICAL at 06:24

## 2023-04-12 RX ADMIN — INSULIN GLARGINE 10 UNIT(S): 100 INJECTION, SOLUTION SUBCUTANEOUS at 08:40

## 2023-04-12 RX ADMIN — BUDESONIDE AND FORMOTEROL FUMARATE DIHYDRATE 2 PUFF(S): 160; 4.5 AEROSOL RESPIRATORY (INHALATION) at 22:00

## 2023-04-12 RX ADMIN — Medication 3 UNIT(S): at 08:39

## 2023-04-12 RX ADMIN — ATORVASTATIN CALCIUM 20 MILLIGRAM(S): 80 TABLET, FILM COATED ORAL at 22:00

## 2023-04-12 RX ADMIN — BUDESONIDE AND FORMOTEROL FUMARATE DIHYDRATE 2 PUFF(S): 160; 4.5 AEROSOL RESPIRATORY (INHALATION) at 08:41

## 2023-04-12 RX ADMIN — Medication 3 MILLILITER(S): at 09:55

## 2023-04-12 RX ADMIN — AMLODIPINE BESYLATE 2.5 MILLIGRAM(S): 2.5 TABLET ORAL at 05:28

## 2023-04-12 RX ADMIN — Medication 1 GRAM(S): at 05:27

## 2023-04-12 RX ADMIN — LACTULOSE 20 GRAM(S): 10 SOLUTION ORAL at 14:01

## 2023-04-12 RX ADMIN — Medication 3: at 12:34

## 2023-04-12 RX ADMIN — Medication 5 MILLIGRAM(S): at 22:00

## 2023-04-12 RX ADMIN — HEPARIN SODIUM 5000 UNIT(S): 5000 INJECTION INTRAVENOUS; SUBCUTANEOUS at 14:03

## 2023-04-12 RX ADMIN — Medication 25 GRAM(S): at 12:33

## 2023-04-12 RX ADMIN — Medication 650 MILLIGRAM(S): at 20:20

## 2023-04-12 RX ADMIN — SENNA PLUS 2 TABLET(S): 8.6 TABLET ORAL at 22:00

## 2023-04-12 RX ADMIN — PANTOPRAZOLE SODIUM 40 MILLIGRAM(S): 20 TABLET, DELAYED RELEASE ORAL at 17:53

## 2023-04-12 RX ADMIN — Medication 1 GRAM(S): at 17:53

## 2023-04-12 RX ADMIN — LACTULOSE 20 GRAM(S): 10 SOLUTION ORAL at 12:33

## 2023-04-12 RX ADMIN — Medication 3 MILLILITER(S): at 20:45

## 2023-04-12 RX ADMIN — PANTOPRAZOLE SODIUM 40 MILLIGRAM(S): 20 TABLET, DELAYED RELEASE ORAL at 05:27

## 2023-04-12 RX ADMIN — HYDROMORPHONE HYDROCHLORIDE 0.25 MILLIGRAM(S): 2 INJECTION INTRAMUSCULAR; INTRAVENOUS; SUBCUTANEOUS at 14:33

## 2023-04-12 NOTE — CONSULT NOTE ADULT - ATTENDING COMMENTS
Events noted, acute hypoxemic resp failure, possible aspiration, COPD smoker, plan as above
I edited the note.   Time-based billing (NON-critical care).   80 minutes spent on total encounter; more than 50% of the visit was spent counseling and / or coordinating care by the attending physician.  The necessity of the time spent during the encounter on this date of service was due to: Coordination of care.

## 2023-04-12 NOTE — PROGRESS NOTE ADULT - ATTENDING COMMENTS
Patient seen at bedside. Changes made within note as well. Discussed with medical team that includes resident(s), medical student(s), nursing staff, case management.     89 year old female with PMHx of HTN, DM, CKD, active smoker, COPD not on home O2 recent admission for aspiration PNA with incidental finding of distal DVT presented to the ED for hypoxia    #Acute Hypoxic respiratory failure likely 2/2 asp PNA/Bilateral with sepsis POA  - CT angio: no PE but B/L lower lobe and R middle lobe consolidations   - COPD not on home 02  - Active smoker   - Recent Distal DVT  - intermittently on 2 LNC  - RVP, strep, legionella, bcx and MRSA negative   - procal 0.14  - s/p 7d course of cefepime and levaquin, completed 4/9  - c/w with prednisone taper, prednisone 10mg to complete on 4/12  - echo ef 55%, GIDD, mod MS, mild AS   - diet per speech and swallow   - PT  - [facility]  - pulse ox 98% on RA was put back on 2 liters   patient not willing to ambulate to ambulatory oxygen was not checked     #LLE distal DVT   - Duplex (3/24/23) noted thrombus in left gastrocnemius and peroneal veins  - repeat duplex unchanged, was seen by vascular on recent admission  - was started on Eliquis given pt is not ambulating and remains on bed but pt has hx of PUD, and c/o of intermittent epigastric burn/ discomfort, would be at risk of GIB, daughter Meena Powell (at bedside) decided to hold on AC    # hyponatremia - stable  - HCTZ on hold    #Dyspepsia  #hiatal hernia  #Peptic Ulcer Disease  - c/w protonix BID and carafate   patient reported intermittent epigastric pain   as per family who was present at bedside mentioned that patient was suppose to have EGD done as outpatient  also reported that prior history of hiatal hernia?  consulted GI, will follow up recommendations , pending     #CKD III  - cr at baseline  - keep off lisinopril   - was following with Nephrology in [place]  - encourage PO intake    #HTN  - was on amlodipine 2.5 mg daily, HCTZ 12.5mg daily and lisinopril 10mg daily in assisted living   - holding HCTZ and lisinopril     #DM  - takes pioglitazone 15mg daily  - c/w insulin lantus 10U at bedtime, lispro 3U TID; monitor FS  - A1c 7.4% (3/2023)    DVT pp: heparin  GI ppx: protonix  Diet: Soft and bite sized/DASH/CC  Code: DNR/DNI  Pending: [facility] when clinically stable, follow GI recommendations  , monitor oxygenation, if patient requires oxygen need to arrange oxygen before discharge ., follow GI . Patient seen at bedside. Changes made within note as well. Discussed with medical team that includes resident(s), medical student(s), nursing staff, case management.     89 year old female with PMHx of HTN, DM, CKD, active smoker, COPD not on home O2 recent admission for aspiration PNA with incidental finding of distal DVT presented to the ED for hypoxia    #Acute Hypoxic respiratory failure likely 2/2 asp PNA/Bilateral with sepsis POA  - CT angio: no PE but B/L lower lobe and R middle lobe consolidations   - COPD not on home 02  - Active smoker   - Recent Distal DVT  - intermittently on 2 LNC  - RVP, strep, legionella, bcx and MRSA negative   - procal 0.14  - s/p 7d course of cefepime and levaquin, completed 4/9  - c/w with prednisone taper, prednisone 10mg to complete on 4/12  - echo ef 55%, GIDD, mod MS, mild AS   - diet per speech and swallow   - PT  - [facility]  - pulse ox 98% on RA was put back on 2 liters   patient not willing to ambulate to ambulatory oxygen was not checked     #LLE distal DVT   - Duplex (3/24/23) noted thrombus in left gastrocnemius and peroneal veins  - repeat duplex unchanged, was seen by vascular on recent admission  - was started on Eliquis given pt is not ambulating and remains on bed but pt has hx of PUD, and c/o of intermittent epigastric burn/ discomfort, would be at risk of GIB, daughter Meena Powell (at bedside) decided to hold on AC    # hyponatremia - stable  - HCTZ on hold    #Dyspepsia  #hiatal hernia  #Peptic Ulcer Disease  - c/w protonix BID and carafate   patient reported intermittent epigastric pain   as per family who was present at bedside mentioned that patient was suppose to have EGD done as outpatient  also reported that prior history of hiatal hernia?  consulted GI, will follow up recommendations , pending   was complaining of epigastric pain in AM which improved afterwards.     #CKD III  - cr at baseline  - keep off lisinopril   - was following with Nephrology in [place]  - encourage PO intake    #HTN  - was on amlodipine 2.5 mg daily, HCTZ 12.5mg daily and lisinopril 10mg daily in assisted living   - holding HCTZ and lisinopril     #DM  - takes pioglitazone 15mg daily  - c/w insulin lantus 10U at bedtime, lispro 3U TID; monitor FS  - A1c 7.4% (3/2023)    DVT pp: heparin  GI ppx: protonix  Diet: Soft and bite sized/DASH/CC  Code: DNR/DNI  Pending: [facility] clinically stable, follow GI recommendations  , monitor oxygenation, if patient requires oxygen need to arrange oxygen before discharge ., follow GI .

## 2023-04-12 NOTE — CONSULT NOTE ADULT - SUBJECTIVE AND OBJECTIVE BOX
Gastroenterology Consultation:    Patient is a 89y old  Female who presents with a chief complaint of SOB (11 Apr 2023 13:19)        Admitted on: 04-03-23      HPI:  89 year old female withPMHx of HTN, DM, CKD, active smoker, COPD not on home O2, recent admission for aspiration PNA with incidental finding of distal DVT presented to the ED for hypoxia. Patient is limited historian, history obtained from charts and by daughters at bedside. They report that patient has been in usual health since her discharge on 3/25 until this morning when she was noted to be short of breath and coughing more than usual. She has small amount of white phlegm. She was hypoxic to 85% on RA and was sent to the ED. Denies any fevers, chills, chest pain, abdominal pain, nausea, vomiting, diarrhea, constipation. Patient completed Vantin course on 3/30/23.     In the ED, VS noted for T 36.9, HR 76, /72, RR 19, SpO2 98% on 4L NC. Labs noted for WBC 8, Hg 10, Cr 1.4, Glu 157, Ca 10.7 (corrected 11.6), Trop 0.04, BNP 1779.   CXR shows worsening R>L opacities.  (03 Apr 2023 09:14)        Prior EGD: No prior records    Prior Colonoscopy: No prior records      PAST MEDICAL & SURGICAL HISTORY:  Hypertension      Diabetes mellitus      Chronic kidney disease, unspecified CKD stage            FAMILY HISTORY:  No family hx of GI malignancies/diseases    Social History:  Tobacco: active smoker since age 13, 1ppd  Alcohol: Denies alcohol use  Drugs: No illicit drug use    Home Medications:  amLODIPine 2.5 mg oral tablet: 1 tab(s) orally once a day (03 Apr 2023 10:39)  Lipitor 20 mg oral tablet: 1 tab(s) orally once a day (03 Apr 2023 10:40)  pioglitazone 15 mg oral tablet: 1 tab(s) orally once a day (03 Apr 2023 10:40)        MEDICATIONS  (STANDING):  albuterol/ipratropium for Nebulization 3 milliLiter(s) Nebulizer every 12 hours  amLODIPine   Tablet 2.5 milliGRAM(s) Oral daily  atorvastatin 20 milliGRAM(s) Oral at bedtime  budesonide  80 MICROgram(s)/formoterol 4.5 MICROgram(s) Inhaler 2 Puff(s) Inhalation two times a day  chlorhexidine 2% Cloths 1 Application(s) Topical <User Schedule>  dextrose 5%. 1000 milliLiter(s) (50 mL/Hr) IV Continuous <Continuous>  dextrose 5%. 1000 milliLiter(s) (100 mL/Hr) IV Continuous <Continuous>  dextrose 50% Injectable 25 Gram(s) IV Push once  dextrose 50% Injectable 12.5 Gram(s) IV Push once  dextrose 50% Injectable 25 Gram(s) IV Push once  glucagon  Injectable 1 milliGRAM(s) IntraMuscular once  heparin   Injectable 5000 Unit(s) SubCutaneous every 8 hours  insulin glargine Injectable (LANTUS) 10 Unit(s) SubCutaneous every morning  insulin lispro (ADMELOG) corrective regimen sliding scale   SubCutaneous three times a day before meals  insulin lispro (ADMELOG) corrective regimen sliding scale   SubCutaneous at bedtime  insulin lispro Injectable (ADMELOG) 3 Unit(s) SubCutaneous three times a day before meals  lactulose Syrup 20 Gram(s) Oral every 4 hours  melatonin 5 milliGRAM(s) Oral at bedtime  pantoprazole    Tablet 40 milliGRAM(s) Oral two times a day  senna 2 Tablet(s) Oral at bedtime  sucralfate 1 Gram(s) Oral two times a day    MEDICATIONS  (PRN):  acetaminophen     Tablet .. 650 milliGRAM(s) Oral every 6 hours PRN Moderate Pain (4 - 6)  albuterol/ipratropium for Nebulization 3 milliLiter(s) Nebulizer every 4 hours PRN Shortness of Breath and/or Wheezing  aluminum hydroxide/magnesium hydroxide/simethicone Suspension 30 milliLiter(s) Oral every 4 hours PRN Dyspepsia  dextrose Oral Gel 15 Gram(s) Oral once PRN Blood Glucose LESS THAN 70 milliGRAM(s)/deciliter  polyethylene glycol 3350 17 Gram(s) Oral daily PRN Constipation      Allergies  penicillin (Unknown)      Review of Systems:   Constitutional:  No Fever, No Chills  ENT/Mouth:  No Hearing Changes,  No Difficulty Swallowing  Eyes:  No Eye Pain, No Vision Changes  Cardiovascular:  No Chest Pain, No Palpitations  Respiratory:  No Cough, No Dyspnea  Gastrointestinal:  As described in HPI  Musculoskeletal:  No Joint Swelling, No Back Pain  Skin:  No Skin Lesions, No Jaundice  Neuro:  No Syncope, No Dizziness  Heme/Lymph:  No Bruising, No Bleeding.          Physical Examination:  T(C): 36.1 (04-12-23 @ 05:00), Max: 36.6 (04-11-23 @ 12:00)  HR: 95 (04-12-23 @ 05:00) (77 - 111)  BP: 124/101 (04-12-23 @ 05:00) (105/57 - 152/80)  RR: 18 (04-12-23 @ 05:00) (18 - 18)  SpO2: 95% (04-12-23 @ 05:00) (94% - 96%)      04-10-23 @ 07:01  -  04-11-23 @ 07:00  --------------------------------------------------------  IN: 0 mL / OUT: 1270 mL / NET: -1270 mL    04-11-23 @ 07:01  -  04-12-23 @ 07:00  --------------------------------------------------------  IN: 200 mL / OUT: 200 mL / NET: 0 mL          GENERAL: AAOx3, no acute distress.  HEAD:  Atraumatic, Normocephalic  EYES: conjunctiva and sclera clear  NECK: Supple, no JVD or thyromegaly  CHEST/LUNG: Clear to auscultation bilaterally; No wheeze, rhonchi, or rales  HEART: Regular rate and rhythm; normal S1, S2, No murmurs.  ABDOMEN: Soft, nontender, nondistended; Bowel sounds present  NEUROLOGY: No asterixis or tremor.   SKIN: Intact, no jaundice        Data:                        9.5    8.77  )-----------( 546      ( 12 Apr 2023 08:20 )             30.3     Hgb Trend:  9.5  04-12-23 @ 08:20  9.2  04-11-23 @ 07:09  9.0  04-10-23 @ 08:26        04-11    135  |  101  |  67<HH>  ----------------------------<  114<H>  5.8<H>   |  25  |  1.6<H>    Ca    9.7      11 Apr 2023 07:09  Mg     1.8     04-11      Liver panel trend:  TBili <0.2   /   AST 18   /   ALT 11   /   AlkP 122   /   Tptn 5.9   /   Alb 2.6    /   DBili --      04-08  TBili 0.3   /   AST 11   /   ALT 8   /   AlkP 114   /   Tptn 6.0   /   Alb 2.7    /   DBili --      04-07  TBili <0.2   /   AST 11   /   ALT 6   /   AlkP 121   /   Tptn 5.9   /   Alb 2.5    /   DBili --      04-06  TBili <0.2   /   AST 16   /   ALT 10   /   AlkP 114   /   Tptn 6.5   /   Alb 2.7    /   DBili --      04-05  TBili <0.2   /   AST 11   /   ALT 8   /   AlkP 105   /   Tptn 6.2   /   Alb 2.6    /   DBili --      04-04  TBili 0.2   /   AST 21   /   ALT 9   /   AlkP 125   /   Tptn 7.1   /   Alb 2.9    /   DBili --      04-03              Radiology:       Gastroenterology Consultation:    Patient is a 89y old  Female who presents with a chief complaint of SOB (11 Apr 2023 13:19)        Admitted on: 04-03-23      HPI:  89 year old female with PMHx of HTN, DM, CKD, active smoker, COPD not on home O2, recent admission for aspiration PNA with incidental finding of distal DVT presented to the ED for hypoxia. Patient is limited historian, history obtained from charts and by daughters at bedside. They report that patient has been in usual health since her discharge on 3/25 until this morning when she was noted to be short of breath and coughing more than usual. She has small amount of white phlegm. She was hypoxic to 85% on RA and was sent to the ED. Patient completed Vantin course on 3/30/23. Patient was stable in the ED on 4L NC. Labs significant for Trop 0.04, BNP 1779 with CXR showing worsening opacities R>L. Admitted for hypoxic respiratory failure 2/2 aspiration pneumonia.     GI consulted for epigastric pain. Patient moved from [place] to [place] 3 months ago, and was hospitalized in [place] for pneumonia and discharged to rehab when she began to develop burning stomach pain in the epigastric area, discharged on protonix (EGD denied due to age). Her symptoms had improved until recently where began to experience epigastric burning/discomfort radiating to her back associated with food intake. She reports the pain is currently 8/10, has associated gagging/nausea/vomiting. Patient also reports having multiple episodes of diarrhea that persist even at night. In between, patient will sometimes become constipated for a few days in between. Patient will occasionally take NSAIDs for pain. Patient had previous endoscopy/colonoscopy but does not remember when.     Prior EGD: No prior records    Prior Colonoscopy: No prior records      PAST MEDICAL & SURGICAL HISTORY:  Hypertension      Diabetes mellitus      Chronic kidney disease, unspecified CKD stage        FAMILY HISTORY:  No family hx of GI malignancies/diseases    Social History:  Tobacco: active smoker since age 13, 1ppd  Alcohol: Denies alcohol use  Drugs: No illicit drug use    Home Medications:  amLODIPine 2.5 mg oral tablet: 1 tab(s) orally once a day (03 Apr 2023 10:39)  Lipitor 20 mg oral tablet: 1 tab(s) orally once a day (03 Apr 2023 10:40)  pioglitazone 15 mg oral tablet: 1 tab(s) orally once a day (03 Apr 2023 10:40)        MEDICATIONS  (STANDING):  albuterol/ipratropium for Nebulization 3 milliLiter(s) Nebulizer every 12 hours  amLODIPine   Tablet 2.5 milliGRAM(s) Oral daily  atorvastatin 20 milliGRAM(s) Oral at bedtime  budesonide  80 MICROgram(s)/formoterol 4.5 MICROgram(s) Inhaler 2 Puff(s) Inhalation two times a day  chlorhexidine 2% Cloths 1 Application(s) Topical <User Schedule>  dextrose 5%. 1000 milliLiter(s) (50 mL/Hr) IV Continuous <Continuous>  dextrose 5%. 1000 milliLiter(s) (100 mL/Hr) IV Continuous <Continuous>  dextrose 50% Injectable 25 Gram(s) IV Push once  dextrose 50% Injectable 12.5 Gram(s) IV Push once  dextrose 50% Injectable 25 Gram(s) IV Push once  glucagon  Injectable 1 milliGRAM(s) IntraMuscular once  heparin   Injectable 5000 Unit(s) SubCutaneous every 8 hours  insulin glargine Injectable (LANTUS) 10 Unit(s) SubCutaneous every morning  insulin lispro (ADMELOG) corrective regimen sliding scale   SubCutaneous three times a day before meals  insulin lispro (ADMELOG) corrective regimen sliding scale   SubCutaneous at bedtime  insulin lispro Injectable (ADMELOG) 3 Unit(s) SubCutaneous three times a day before meals  lactulose Syrup 20 Gram(s) Oral every 4 hours  melatonin 5 milliGRAM(s) Oral at bedtime  pantoprazole    Tablet 40 milliGRAM(s) Oral two times a day  senna 2 Tablet(s) Oral at bedtime  sucralfate 1 Gram(s) Oral two times a day    MEDICATIONS  (PRN):  acetaminophen     Tablet .. 650 milliGRAM(s) Oral every 6 hours PRN Moderate Pain (4 - 6)  albuterol/ipratropium for Nebulization 3 milliLiter(s) Nebulizer every 4 hours PRN Shortness of Breath and/or Wheezing  aluminum hydroxide/magnesium hydroxide/simethicone Suspension 30 milliLiter(s) Oral every 4 hours PRN Dyspepsia  dextrose Oral Gel 15 Gram(s) Oral once PRN Blood Glucose LESS THAN 70 milliGRAM(s)/deciliter  polyethylene glycol 3350 17 Gram(s) Oral daily PRN Constipation      Allergies  penicillin (Unknown)      Review of Systems:   Constitutional:  No Fever, No Chills  ENT/Mouth:  No Hearing Changes,  No Difficulty Swallowing  Eyes:  No Eye Pain, No Vision Changes  Cardiovascular:  No Chest Pain, No Palpitations  Respiratory:  No Cough, No Dyspnea  Gastrointestinal:  As described in HPI  Musculoskeletal:  No Joint Swelling, No Back Pain  Skin:  No Skin Lesions, No Jaundice  Neuro:  No Syncope, No Dizziness  Heme/Lymph:  No Bruising, No Bleeding.          Physical Examination:  T(C): 36.1 (04-12-23 @ 05:00), Max: 36.6 (04-11-23 @ 12:00)  HR: 95 (04-12-23 @ 05:00) (77 - 111)  BP: 124/101 (04-12-23 @ 05:00) (105/57 - 152/80)  RR: 18 (04-12-23 @ 05:00) (18 - 18)  SpO2: 95% (04-12-23 @ 05:00) (94% - 96%)      04-10-23 @ 07:01  -  04-11-23 @ 07:00  --------------------------------------------------------  IN: 0 mL / OUT: 1270 mL / NET: -1270 mL    04-11-23 @ 07:01  -  04-12-23 @ 07:00  --------------------------------------------------------  IN: 200 mL / OUT: 200 mL / NET: 0 mL          GENERAL: AAOx2, no acute distress.  HEAD:  Atraumatic, Normocephalic  EYES: conjunctiva and sclera clear  NECK: Supple, no JVD or thyromegaly  CHEST/LUNG: Clear to auscultation bilaterally; No wheeze, rhonchi, or rales  HEART: Regular rate and rhythm; normal S1, S2, No murmurs.  ABDOMEN: Soft, tender to palpation, nondistended; Bowel sounds present  NEUROLOGY: No asterixis or tremor.   SKIN: Intact, no jaundice        Data:                        9.5    8.77  )-----------( 546      ( 12 Apr 2023 08:20 )             30.3     Hgb Trend:  9.5  04-12-23 @ 08:20  9.2  04-11-23 @ 07:09  9.0  04-10-23 @ 08:26        04-11    135  |  101  |  67<HH>  ----------------------------<  114<H>  5.8<H>   |  25  |  1.6<H>    Ca    9.7      11 Apr 2023 07:09  Mg     1.8     04-11      Liver panel trend:  TBili <0.2   /   AST 18   /   ALT 11   /   AlkP 122   /   Tptn 5.9   /   Alb 2.6    /   DBili --      04-08  TBili 0.3   /   AST 11   /   ALT 8   /   AlkP 114   /   Tptn 6.0   /   Alb 2.7    /   DBili --      04-07  TBili <0.2   /   AST 11   /   ALT 6   /   AlkP 121   /   Tptn 5.9   /   Alb 2.5    /   DBili --      04-06  TBili <0.2   /   AST 16   /   ALT 10   /   AlkP 114   /   Tptn 6.5   /   Alb 2.7    /   DBili --      04-05  TBili <0.2   /   AST 11   /   ALT 8   /   AlkP 105   /   Tptn 6.2   /   Alb 2.6    /   DBili --      04-04  TBili 0.2   /   AST 21   /   ALT 9   /   AlkP 125   /   Tptn 7.1   /   Alb 2.9    /   DBili --      04-03              Radiology:       Gastroenterology Consultation:    Patient is a 89y old  Female who presents with a chief complaint of SOB (11 Apr 2023 13:19)        Admitted on: 04-03-23      HPI:  89 year old female with PMHx of HTN, DM, CKD, active smoker, COPD not on home O2, recent admission for aspiration PNA with incidental finding of distal DVT presented to the ED for hypoxia. Patient is limited historian, history obtained from charts and by daughters at bedside. They report that patient has been in usual health since her discharge on 3/25 until this morning when she was noted to be short of breath and coughing more than usual. She has small amount of white phlegm. She was hypoxic to 85% on RA and was sent to the ED. Patient completed Vantin course on 3/30/23. Patient was stable in the ED on 4L NC. Labs significant for Trop 0.04, BNP 1779 with CXR showing worsening opacities R>L. Admitted for hypoxic respiratory failure 2/2 aspiration pneumonia.     GI consulted for epigastric pain. Patient moved from [place] to [place] 3 months ago, and was hospitalized in [place] for pneumonia and discharged to rehab when she began to develop burning stomach pain in the epigastric area, discharged on protonix (EGD denied due to age). Her symptoms had improved until recently where began to experience epigastric burning/discomfort radiating to her back associated with food intake. She reports the pain is currently 8/10, has associated gagging/nausea/vomiting. Patient also reports having multiple episodes of diarrhea that persist even at night. In between, patient will sometimes become constipated for a few days in between. Patient will occasionally take NSAIDs for pain. Patient had previous endoscopy/colonoscopy but does not remember when.     Prior EGD: No prior records    Prior Colonoscopy: No prior records      PAST MEDICAL & SURGICAL HISTORY:  Hypertension      Diabetes mellitus      Chronic kidney disease, unspecified CKD stage        FAMILY HISTORY:  No family hx of GI malignancies/diseases    Social History:  Tobacco: active smoker since age 13, 1ppd  Alcohol: Denies alcohol use  Drugs: No illicit drug use    Home Medications:  amLODIPine 2.5 mg oral tablet: 1 tab(s) orally once a day (03 Apr 2023 10:39)  Lipitor 20 mg oral tablet: 1 tab(s) orally once a day (03 Apr 2023 10:40)  pioglitazone 15 mg oral tablet: 1 tab(s) orally once a day (03 Apr 2023 10:40)        MEDICATIONS  (STANDING):  albuterol/ipratropium for Nebulization 3 milliLiter(s) Nebulizer every 12 hours  amLODIPine   Tablet 2.5 milliGRAM(s) Oral daily  atorvastatin 20 milliGRAM(s) Oral at bedtime  budesonide  80 MICROgram(s)/formoterol 4.5 MICROgram(s) Inhaler 2 Puff(s) Inhalation two times a day  chlorhexidine 2% Cloths 1 Application(s) Topical <User Schedule>  dextrose 5%. 1000 milliLiter(s) (50 mL/Hr) IV Continuous <Continuous>  dextrose 5%. 1000 milliLiter(s) (100 mL/Hr) IV Continuous <Continuous>  dextrose 50% Injectable 25 Gram(s) IV Push once  dextrose 50% Injectable 12.5 Gram(s) IV Push once  dextrose 50% Injectable 25 Gram(s) IV Push once  glucagon  Injectable 1 milliGRAM(s) IntraMuscular once  heparin   Injectable 5000 Unit(s) SubCutaneous every 8 hours  insulin glargine Injectable (LANTUS) 10 Unit(s) SubCutaneous every morning  insulin lispro (ADMELOG) corrective regimen sliding scale   SubCutaneous three times a day before meals  insulin lispro (ADMELOG) corrective regimen sliding scale   SubCutaneous at bedtime  insulin lispro Injectable (ADMELOG) 3 Unit(s) SubCutaneous three times a day before meals  lactulose Syrup 20 Gram(s) Oral every 4 hours  melatonin 5 milliGRAM(s) Oral at bedtime  pantoprazole    Tablet 40 milliGRAM(s) Oral two times a day  senna 2 Tablet(s) Oral at bedtime  sucralfate 1 Gram(s) Oral two times a day    MEDICATIONS  (PRN):  acetaminophen     Tablet .. 650 milliGRAM(s) Oral every 6 hours PRN Moderate Pain (4 - 6)  albuterol/ipratropium for Nebulization 3 milliLiter(s) Nebulizer every 4 hours PRN Shortness of Breath and/or Wheezing  aluminum hydroxide/magnesium hydroxide/simethicone Suspension 30 milliLiter(s) Oral every 4 hours PRN Dyspepsia  dextrose Oral Gel 15 Gram(s) Oral once PRN Blood Glucose LESS THAN 70 milliGRAM(s)/deciliter  polyethylene glycol 3350 17 Gram(s) Oral daily PRN Constipation      Allergies  penicillin (Unknown)      Review of Systems:   Constitutional:  No Fever, No Chills  ENT/Mouth:  hard of hearing, No Difficulty Swallowing  Eyes:  No Eye Pain, No Vision Changes  Cardiovascular:  No Chest Pain, No Palpitations  Respiratory:  + coughing w/ sputum, No Dyspnea  Gastrointestinal:  As described in HPI  Musculoskeletal:  No Joint Swelling, No Back Pain  Skin:  No Skin Lesions, No Jaundice  Neuro:  No Syncope, No Dizziness  Heme/Lymph:  No Bruising, No Bleeding.          Physical Examination:  T(C): 36.1 (04-12-23 @ 05:00), Max: 36.6 (04-11-23 @ 12:00)  HR: 95 (04-12-23 @ 05:00) (77 - 111)  BP: 124/101 (04-12-23 @ 05:00) (105/57 - 152/80)  RR: 18 (04-12-23 @ 05:00) (18 - 18)  SpO2: 95% (04-12-23 @ 05:00) (94% - 96%)      04-10-23 @ 07:01  -  04-11-23 @ 07:00  --------------------------------------------------------  IN: 0 mL / OUT: 1270 mL / NET: -1270 mL    04-11-23 @ 07:01  -  04-12-23 @ 07:00  --------------------------------------------------------  IN: 200 mL / OUT: 200 mL / NET: 0 mL          GENERAL: AAOx2, no acute distress.  HEAD:  Atraumatic, Normocephalic  EYES: conjunctiva and sclera clear  NECK: Supple, no JVD or thyromegaly  CHEST/LUNG: Clear to auscultation bilaterally; No wheeze, rhonchi, or rales  HEART: Regular rate and rhythm; normal S1, S2, No murmurs.  ABDOMEN: Soft, tender to palpation, nondistended; Bowel sounds present  NEUROLOGY: No asterixis or tremor.   SKIN: Intact, no jaundice        Data:                        9.5    8.77  )-----------( 546      ( 12 Apr 2023 08:20 )             30.3     Hgb Trend:  9.5  04-12-23 @ 08:20  9.2  04-11-23 @ 07:09  9.0  04-10-23 @ 08:26        04-11    135  |  101  |  67<HH>  ----------------------------<  114<H>  5.8<H>   |  25  |  1.6<H>    Ca    9.7      11 Apr 2023 07:09  Mg     1.8     04-11      Liver panel trend:  TBili <0.2   /   AST 18   /   ALT 11   /   AlkP 122   /   Tptn 5.9   /   Alb 2.6    /   DBili --      04-08  TBili 0.3   /   AST 11   /   ALT 8   /   AlkP 114   /   Tptn 6.0   /   Alb 2.7    /   DBili --      04-07  TBili <0.2   /   AST 11   /   ALT 6   /   AlkP 121   /   Tptn 5.9   /   Alb 2.5    /   DBili --      04-06  TBili <0.2   /   AST 16   /   ALT 10   /   AlkP 114   /   Tptn 6.5   /   Alb 2.7    /   DBili --      04-05  TBili <0.2   /   AST 11   /   ALT 8   /   AlkP 105   /   Tptn 6.2   /   Alb 2.6    /   DBili --      04-04  TBili 0.2   /   AST 21   /   ALT 9   /   AlkP 125   /   Tptn 7.1   /   Alb 2.9    /   DBili --      04-03              Radiology:    < from: CT Abdomen and Pelvis No Cont (03.21.23 @ 22:01) >  FINDINGS:    LOWER CHEST: Bilateral lower lobe consolidation, superimposed emphysema,   compatible with pneumonia in the appropriate clinical setting. Occlusion   of peripheral lower lobe bronchi; correlation for aspiration recommended.    HEPATOBILIARY: Cholelithiasis. Liver unremarkable. No biliary dilation.    SPLEEN: Unremarkable.    PANCREAS: Unremarkable.    ADRENAL GLANDS: Unremarkable.    KIDNEYS: No hydronephrosis. Bilateral renal cysts, not completely   evaluated. Nonobstructing left renal calculi or vascular calcifications.    ABDOMINOPELVIC NODES: Unremarkable.    PELVIC ORGANS: Unremarkable.    PERITONEUM/MESENTERY/BOWEL: Colonic diverticulosis. No ascites. No bowel   obstruction. No pneumoperitoneum.    BONES/SOFT TISSUES: Osteopenia. Degenerative change of spine.    OTHER: Vascular calcifications.      IMPRESSION:    Bilateral lower lobe consolidation, superimposed emphysema, compatible   with pneumonia in the appropriate clinical setting.    Occlusion of peripheral lower lobe bronchi; correlation for aspiration   recommended.    < end of copied text >      < from: Xray Kidney Ureter Bladder (04.11.23 @ 06:28) >  Findings/  impression:    Copious stool seen throughout the colon. Nonobstructive bowel gas   pattern. Degenerative changes. Suggestion of contrast within the stomach.    < end of copied text >     Gastroenterology Consultation:  Patient is a 89y old  Female who presents with a chief complaint of SOB (11 Apr 2023 13:19)    Admitted on: 04-03-23  HPI:  89 year old female with PMHx of HTN, DM, CKD, active smoker, COPD not on home O2, recent admission for aspiration PNA with incidental finding of distal DVT presented to the ED for hypoxia.   Patient is limited historian, history obtained from charts and by daughters at bedside. They report that patient has been in usual health since her discharge on 3/25 until this morning when she was noted to be short of breath and coughing more than usual.   She has small amount of white phlegm. She was hypoxic to 85% on RA and was sent to the ED. Patient completed Vantin course on 3/30/23.   Patient was stable in the ED on 4L NC. Labs significant for Trop 0.04, BNP 1779 with CXR showing worsening opacities R>L. Admitted for hypoxic respiratory failure 2/2 aspiration pneumonia.     GI consulted for epigastric pain x 5 mo duration. Patient moved from [place] to [place] 3 months ago, and was hospitalized in [place] for pneumonia and discharged to rehab when she began to develop burning stomach pain in the epigastric area, discharged on protonix (EGD denied due to age). Her symptoms had improved until recently where began to experience epigastric burning/discomfort radiating to her back associated with food intake. She reports the pain is currently 8/10, has associated gagging/nausea/vomiting. Patient also reports having multiple episodes of diarrhea that persist even at night. In between, patient will sometimes become constipated for a few days in between. Patient will occasionally take NSAIDs for pain. Patient had previous endoscopy/colonoscopy but does not remember when.   Pt otherwise denies melena or brbpr.   ROS otherwise negative.      Prior EGD: No prior records  Prior Colonoscopy: No prior records    PAST MEDICAL & SURGICAL HISTORY:  Hypertension  Diabetes mellitus  Chronic kidney disease, unspecified CKD stage      FAMILY HISTORY:  No family hx of GI malignancies/diseases    Social History:  Tobacco: active smoker since age 13, 1ppd  Alcohol: Denies alcohol use  Drugs: No illicit drug use    Home Medications:  amLODIPine 2.5 mg oral tablet: 1 tab(s) orally once a day (03 Apr 2023 10:39)  Lipitor 20 mg oral tablet: 1 tab(s) orally once a day (03 Apr 2023 10:40)  pioglitazone 15 mg oral tablet: 1 tab(s) orally once a day (03 Apr 2023 10:40)        MEDICATIONS  (STANDING):  albuterol/ipratropium for Nebulization 3 milliLiter(s) Nebulizer every 12 hours  amLODIPine   Tablet 2.5 milliGRAM(s) Oral daily  atorvastatin 20 milliGRAM(s) Oral at bedtime  budesonide  80 MICROgram(s)/formoterol 4.5 MICROgram(s) Inhaler 2 Puff(s) Inhalation two times a day  chlorhexidine 2% Cloths 1 Application(s) Topical <User Schedule>  dextrose 5%. 1000 milliLiter(s) (50 mL/Hr) IV Continuous <Continuous>  dextrose 5%. 1000 milliLiter(s) (100 mL/Hr) IV Continuous <Continuous>  dextrose 50% Injectable 25 Gram(s) IV Push once  dextrose 50% Injectable 12.5 Gram(s) IV Push once  dextrose 50% Injectable 25 Gram(s) IV Push once  glucagon  Injectable 1 milliGRAM(s) IntraMuscular once  heparin   Injectable 5000 Unit(s) SubCutaneous every 8 hours  insulin glargine Injectable (LANTUS) 10 Unit(s) SubCutaneous every morning  insulin lispro (ADMELOG) corrective regimen sliding scale   SubCutaneous three times a day before meals  insulin lispro (ADMELOG) corrective regimen sliding scale   SubCutaneous at bedtime  insulin lispro Injectable (ADMELOG) 3 Unit(s) SubCutaneous three times a day before meals  lactulose Syrup 20 Gram(s) Oral every 4 hours  melatonin 5 milliGRAM(s) Oral at bedtime  pantoprazole    Tablet 40 milliGRAM(s) Oral two times a day  senna 2 Tablet(s) Oral at bedtime  sucralfate 1 Gram(s) Oral two times a day    MEDICATIONS  (PRN):  acetaminophen     Tablet .. 650 milliGRAM(s) Oral every 6 hours PRN Moderate Pain (4 - 6)  albuterol/ipratropium for Nebulization 3 milliLiter(s) Nebulizer every 4 hours PRN Shortness of Breath and/or Wheezing  aluminum hydroxide/magnesium hydroxide/simethicone Suspension 30 milliLiter(s) Oral every 4 hours PRN Dyspepsia  dextrose Oral Gel 15 Gram(s) Oral once PRN Blood Glucose LESS THAN 70 milliGRAM(s)/deciliter  polyethylene glycol 3350 17 Gram(s) Oral daily PRN Constipation      Allergies  penicillin (Unknown)      Review of Systems:   Constitutional:  No Fever, No Chills  ENT/Mouth:  hard of hearing, No Difficulty Swallowing  Eyes:  No Eye Pain, No Vision Changes  Cardiovascular:  No Chest Pain, No Palpitations  Respiratory:  + coughing w/ sputum, No Dyspnea  Gastrointestinal:  As described in HPI  Musculoskeletal:  No Joint Swelling, No Back Pain  Skin:  No Skin Lesions, No Jaundice  Neuro:  No Syncope, No Dizziness  Heme/Lymph:  No Bruising, No Bleeding.          Physical Examination:  T(C): 36.1 (04-12-23 @ 05:00), Max: 36.6 (04-11-23 @ 12:00)  HR: 95 (04-12-23 @ 05:00) (77 - 111)  BP: 124/101 (04-12-23 @ 05:00) (105/57 - 152/80)  RR: 18 (04-12-23 @ 05:00) (18 - 18)  SpO2: 95% (04-12-23 @ 05:00) (94% - 96%)      04-10-23 @ 07:01  -  04-11-23 @ 07:00  --------------------------------------------------------  IN: 0 mL / OUT: 1270 mL / NET: -1270 mL    04-11-23 @ 07:01  -  04-12-23 @ 07:00  --------------------------------------------------------  IN: 200 mL / OUT: 200 mL / NET: 0 mL          GENERAL: AAOx2, no acute distress.  HEAD:  Atraumatic, Normocephalic  EYES: conjunctiva and sclera clear  NECK: Supple, no JVD or thyromegaly  CHEST/LUNG: Clear to auscultation bilaterally; No wheeze, rhonchi, or rales  HEART: Regular rate and rhythm; normal S1, S2, No murmurs.  ABDOMEN: Soft, tender to palpation, nondistended; Bowel sounds present  NEUROLOGY: No asterixis or tremor.   SKIN: Intact, no jaundice        Data:                        9.5    8.77  )-----------( 546      ( 12 Apr 2023 08:20 )             30.3     Hgb Trend:  9.5  04-12-23 @ 08:20  9.2  04-11-23 @ 07:09  9.0  04-10-23 @ 08:26        04-11    135  |  101  |  67<HH>  ----------------------------<  114<H>  5.8<H>   |  25  |  1.6<H>    Ca    9.7      11 Apr 2023 07:09  Mg     1.8     04-11      Liver panel trend:  TBili <0.2   /   AST 18   /   ALT 11   /   AlkP 122   /   Tptn 5.9   /   Alb 2.6    /   DBili --      04-08  TBili 0.3   /   AST 11   /   ALT 8   /   AlkP 114   /   Tptn 6.0   /   Alb 2.7    /   DBili --      04-07  TBili <0.2   /   AST 11   /   ALT 6   /   AlkP 121   /   Tptn 5.9   /   Alb 2.5    /   DBili --      04-06  TBili <0.2   /   AST 16   /   ALT 10   /   AlkP 114   /   Tptn 6.5   /   Alb 2.7    /   DBili --      04-05  TBili <0.2   /   AST 11   /   ALT 8   /   AlkP 105   /   Tptn 6.2   /   Alb 2.6    /   DBili --      04-04  TBili 0.2   /   AST 21   /   ALT 9   /   AlkP 125   /   Tptn 7.1   /   Alb 2.9    /   DBili --      04-03              Radiology:    < from: CT Abdomen and Pelvis No Cont (03.21.23 @ 22:01) >  FINDINGS:    LOWER CHEST: Bilateral lower lobe consolidation, superimposed emphysema,   compatible with pneumonia in the appropriate clinical setting. Occlusion   of peripheral lower lobe bronchi; correlation for aspiration recommended.    HEPATOBILIARY: Cholelithiasis. Liver unremarkable. No biliary dilation.    SPLEEN: Unremarkable.    PANCREAS: Unremarkable.    ADRENAL GLANDS: Unremarkable.    KIDNEYS: No hydronephrosis. Bilateral renal cysts, not completely   evaluated. Nonobstructing left renal calculi or vascular calcifications.    ABDOMINOPELVIC NODES: Unremarkable.    PELVIC ORGANS: Unremarkable.    PERITONEUM/MESENTERY/BOWEL: Colonic diverticulosis. No ascites. No bowel   obstruction. No pneumoperitoneum.    BONES/SOFT TISSUES: Osteopenia. Degenerative change of spine.    OTHER: Vascular calcifications.      IMPRESSION:    Bilateral lower lobe consolidation, superimposed emphysema, compatible   with pneumonia in the appropriate clinical setting.    Occlusion of peripheral lower lobe bronchi; correlation for aspiration   recommended.    < end of copied text >      < from: Xray Kidney Ureter Bladder (04.11.23 @ 06:28) >  Findings/  impression:    Copious stool seen throughout the colon. Nonobstructive bowel gas   pattern. Degenerative changes. Suggestion of contrast within the stomach.    < end of copied text >

## 2023-04-12 NOTE — PROGRESS NOTE ADULT - SUBJECTIVE AND OBJECTIVE BOX
Continues to have epigastric/LUQ pain. now also has back pain. otherwise no other complaints.     VITAL SIGNS:  T(C): 36.1 (04-12-23 @ 05:00), Max: 36.6 (04-11-23 @ 12:00)  HR: 95 (04-12-23 @ 05:00) (77 - 111)  BP: 124/101 (04-12-23 @ 05:00) (105/57 - 152/80)  RR: 18 (04-12-23 @ 05:00) (18 - 18)  SpO2: 95% (04-12-23 @ 05:00) (94% - 96%)      PHYSICAL EXAM:  GENERAL: NAD  HEAD: Atraumatic, Normocephalic  NERVOUS SYSTEM: hard of hearing, answers questions appropriately; Moves all ext  CHEST/LUNG: Clear to auscultation bilaterally; No rales, rhonchi, wheezing, or rubs  HEART: Regular rate and rhythm. Normal S1/S1. No murmurs, rubs, or gallops  ABDOMEN: TTP epigastric, under left breast. Soft, Nondistended; Bowel sounds present  EXTREMITIES: no ble edema      MEDICATIONS:  MEDICATIONS  (STANDING):  albuterol/ipratropium for Nebulization 3 milliLiter(s) Nebulizer every 12 hours  amLODIPine   Tablet 2.5 milliGRAM(s) Oral daily  atorvastatin 20 milliGRAM(s) Oral at bedtime  budesonide  80 MICROgram(s)/formoterol 4.5 MICROgram(s) Inhaler 2 Puff(s) Inhalation two times a day  chlorhexidine 2% Cloths 1 Application(s) Topical <User Schedule>  dextrose 5%. 1000 milliLiter(s) (50 mL/Hr) IV Continuous <Continuous>  dextrose 5%. 1000 milliLiter(s) (100 mL/Hr) IV Continuous <Continuous>  dextrose 50% Injectable 25 Gram(s) IV Push once  dextrose 50% Injectable 12.5 Gram(s) IV Push once  dextrose 50% Injectable 25 Gram(s) IV Push once  glucagon  Injectable 1 milliGRAM(s) IntraMuscular once  heparin   Injectable 5000 Unit(s) SubCutaneous every 8 hours  insulin glargine Injectable (LANTUS) 10 Unit(s) SubCutaneous every morning  insulin lispro (ADMELOG) corrective regimen sliding scale   SubCutaneous three times a day before meals  insulin lispro (ADMELOG) corrective regimen sliding scale   SubCutaneous at bedtime  insulin lispro Injectable (ADMELOG) 3 Unit(s) SubCutaneous three times a day before meals  lactulose Syrup 20 Gram(s) Oral every 4 hours  magnesium sulfate  IVPB 2 Gram(s) IV Intermittent once  melatonin 5 milliGRAM(s) Oral at bedtime  pantoprazole    Tablet 40 milliGRAM(s) Oral two times a day  senna 2 Tablet(s) Oral at bedtime  sucralfate 1 Gram(s) Oral two times a day    MEDICATIONS  (PRN):  acetaminophen     Tablet .. 650 milliGRAM(s) Oral every 6 hours PRN Moderate Pain (4 - 6)  albuterol/ipratropium for Nebulization 3 milliLiter(s) Nebulizer every 4 hours PRN Shortness of Breath and/or Wheezing  aluminum hydroxide/magnesium hydroxide/simethicone Suspension 30 milliLiter(s) Oral every 4 hours PRN Dyspepsia  dextrose Oral Gel 15 Gram(s) Oral once PRN Blood Glucose LESS THAN 70 milliGRAM(s)/deciliter  polyethylene glycol 3350 17 Gram(s) Oral daily PRN Constipation      LABS:                        9.5    8.77  )-----------( 546      ( 12 Apr 2023 08:20 )             30.3     04-12    132<L>  |  98  |  55<H>  ----------------------------<  123<H>  4.7   |  24  |  1.4    Ca    9.4      12 Apr 2023 08:20  Mg     1.7     04-12

## 2023-04-12 NOTE — PROGRESS NOTE ADULT - ASSESSMENT
89 year old female with PMHx of HTN, DM, CKD, active smoker, COPD not on home O2 recent admission for aspiration PNA with incidental finding of distal DVT presented to the ED for hypoxia    #Acute Hypoxic respiratory failure likely 2/2 asp PNA/Bilateral with sepsis POA  - CT angio: no PE but B/L lower lobe and R middle lobe consolidations   - COPD not on home 02  - Active smoker   - Recent Distal DVT  - intermittently on 2 LNC  - RVP, strep, legionella, bcx and MRSA negative   - procal 0.14  - s/p 7d course of cefepime and levaquin, completed 4/9  - c/w with prednisone taper, prednisone 10mg to complete on 4/12  - echo ef 55%, GIDD, mod MS, mild AS   - diet per speech and swallow   - PT  - [facility] CM  - pulse ox 98% on RA    #Chest/epigastric/LUQ/back pain  - TTP  - KUB: copious stool seen throughout the colon. nonobstructive bowel gas pattern. suggestion of contrast in the stomach  - troponin 0.01  - lactate 1.3  - bowel regimen   - GI consult: concern for ischemic colitis, repeat lactate, will follow     #LLE distal DVT   - Duplex (3/24/23) noted thrombus in left gastrocnemius and peroneal veins  - repeat duplex unchanged, was seen by vascular on recent admission  - was started on Eliquis given pt is not ambulating and remains on bed but pt has hx of PUD, and c/o of intermittent epigastric burn/ discomfort, would be at risk of GIB, daughter Meena Powell (at bedside) decided to hold on AC    # hyponatremia - stable  - HCTZ on hold    #Dyspepsia  #Peptic Ulcer Disease  #Hiatal hernia  - c/w protonix BID and carafate   - intermittent epigastric pain  - GI consult    #CKD III  - cr at baseline  - keep off lisinopril   - was following with Nephrology in [place]  - encourage PO intake    #HTN  - was on amlodipine 2.5 mg daily, HCTZ 12.5mg daily and lisinopril 10mg daily in assisted living   - holding HCTZ and lisinopril     #DM  - takes pioglitazone 15mg daily  - c/w insulin lantus 10U at bedtime, lispro 3U TID; monitor FS  - A1c 7.4% (3/2023)    DVT pp: heparin  GI ppx: protonix  Diet: Soft and bite sized/DASH/CC  Code: DNR/DNI  Pending: [facility] to [facility] when able, GI consult, oxygen arrangement if needed 89 year old female with PMHx of HTN, DM, CKD, active smoker, COPD not on home O2 recent admission for aspiration PNA with incidental finding of distal DVT presented to the ED for hypoxia    #Acute Hypoxic respiratory failure likely 2/2 asp PNA/Bilateral with sepsis POA  - CT angio: no PE but B/L lower lobe and R middle lobe consolidations   - COPD not on home 02  - Active smoker   - Recent Distal DVT  - intermittently on 2 LNC  - RVP, strep, legionella, bcx and MRSA negative   - procal 0.14  - s/p 7d course of cefepime and levaquin, completed 4/9  - c/w with prednisone taper, prednisone 10mg to complete on 4/12  - echo ef 55%, GIDD, mod MS, mild AS   - diet per speech and swallow   - PT  - [facility] CM  - pulse ox 98% on RA    #Chest/epigastric/LUQ/back pain  - TTP  - KUB: copious stool seen throughout the colon. nonobstructive bowel gas pattern. suggestion of contrast in the stomach  - troponin 0.01  - lactate 1.3 -> 1.2  - bowel regimen   - GI consult    #LLE distal DVT   - Duplex (3/24/23) noted thrombus in left gastrocnemius and peroneal veins  - repeat duplex unchanged, was seen by vascular on recent admission  - was started on Eliquis given pt is not ambulating and remains on bed but pt has hx of PUD, and c/o of intermittent epigastric burn/ discomfort, would be at risk of GIB, daughter Meena Powell (at bedside) decided to hold on AC    # hyponatremia - stable  - HCTZ on hold    #Dyspepsia  #Peptic Ulcer Disease  #Hiatal hernia  - c/w protonix BID and carafate   - intermittent epigastric pain  - GI consult    #CKD III  - cr at baseline  - keep off lisinopril   - was following with Nephrology in [place]  - encourage PO intake    #HTN  - was on amlodipine 2.5 mg daily, HCTZ 12.5mg daily and lisinopril 10mg daily in assisted living   - holding HCTZ and lisinopril     #DM  - takes pioglitazone 15mg daily  - c/w insulin lantus 10U at bedtime, lispro 3U TID; monitor FS  - A1c 7.4% (3/2023)    DVT pp: heparin  GI ppx: protonix  Diet: Soft and bite sized/DASH/CC  Code: DNR/DNI  Pending: [facility] to [facility] when [facility], GI consult, oxygen arrangement if needed

## 2023-04-12 NOTE — CONSULT NOTE ADULT - ASSESSMENT
89 year old female with PMHx of HTN, DM, CKD, active smoker, COPD not on home O2 recent admission for aspiration PNA with incidental finding of distal DVT presented to the ED for hypoxia. Admitted for hypoxic respiratory failure 2/2 aspiration pneumonia. GI consulted for epigastric pain,    #Epigastric Burning/Discomfort, r/o PUD vs ischemic colitis  - epigastric pain radiating to the back, associated with PO intake for the past 3 months along with nausea and vomiting  - reports NSAID use occasionally  -  89 year old female with PMHx of HTN, DM, CKD, active smoker, COPD not on home O2 recent admission for aspiration PNA with incidental finding of distal DVT presented to the ED for hypoxia. Admitted for hypoxic respiratory failure 2/2 aspiration pneumonia. GI consulted for epigastric pain.    #Epigastric Burning/Discomfort, r/o PUD  #Chronic Anemia  - epigastric pain radiating to the back, associated with PO intake for the past 3 months along with nausea and vomiting  - multiple episodes of diarrhea   - reports NSAID use occasionally  - Hgb 9.2, MCV 90  - iron studies in March 2023 c/w iron deficiency  - Lactate 1.3 (wnl), WBC 8K    Plan:  -  89 year old female with PMHx of HTN, DM, CKD, active smoker, COPD not on home O2 recent admission for aspiration PNA with incidental finding of distal DVT presented to the ED for hypoxia. Admitted for hypoxic respiratory failure 2/2 aspiration pneumonia. GI consulted for epigastric pain.    #Epigastric Burning/Discomfort, r/o PUD  #Chronic Anemia  - epigastric pain radiating to the back, associated with PO intake for the past 3 months along with nausea and vomiting  - severe constipation   - reports NSAID use occasionally  - Hgb 9.2, MCV 90  - iron studies in March 2023 c/w iron deficiency  - Lactate 1.3 (wnl), WBC 8K  - Lipase 18, GI and H pylori stool test negative in March  - CT A/P non cont 3/21/23: cholelithiasis, colonic diverticulosis   - KUB 4/11: copious stool in colon, non obstructive gas pattern    Plan:  - Patient is at high risk for any endoscopic intervention given underlying comorbidities  - Conservative management as per primary team  - avoid constipation  - bowel regimen w/ senna and miralax  - continue PPI BID and carafate 1g BID 89 year old female with PMHx of HTN, DM, CKD, active smoker, COPD not on home O2 recent admission for aspiration PNA with incidental finding of distal DVT presented to the ED for hypoxia. Admitted for hypoxic respiratory failure 2/2 aspiration pneumonia. GI consulted for epigastric pain.    #Epigastric Burning/Discomfort, r/o PUD  #Chronic Anemia  - epigastric pain radiating to the back, associated with PO intake for the past 3 months along with nausea and vomiting  - severe constipation   - reports NSAID use occasionally  - Hgb 9.2, MCV 90  - iron studies in March 2023, slight low iron   - Lactate 1.3 (wnl), WBC 8K  - Lipase 18, GI and H pylori stool test negative in March  - CT A/P non cont 3/21/23: cholelithiasis, colonic diverticulosis   - KUB 4/11: copious stool in colon, non obstructive gas pattern    Plan:  - Patient is at high risk for any endoscopic intervention given underlying comorbidities  - Conservative management as per primary team  - avoid constipation  - bowel regimen w/ senna and miralax  - change to IV PPI BID and continue carafate 1g BID  - DVT treatment as per primary team 89 year old female with PMHx of HTN, DM, CKD, active smoker, COPD not on home O2 recent admission for aspiration PNA with incidental finding of distal DVT presented to the ED for hypoxia. Admitted for hypoxic respiratory failure 2/2 aspiration pneumonia. GI consulted for epigastric pain.    #Epigastric Burning/Discomfort, r/o PUD  #Chronic Anemia  - epigastric pain radiating to the back, associated with PO intake for the past 3 months along with nausea and vomiting  - severe constipation   - reports NSAID use occasionally  - Hgb 9.2, MCV 90  - iron studies in March 2023, slight low iron   - Lactate 1.3 (wnl), WBC 8K  - Lipase 18, GI and H pylori stool test negative in March  - CT A/P non cont 3/21/23: cholelithiasis, colonic diverticulosis   - KUB 4/11: copious stool in colon, non obstructive gas pattern    Plan:  - Patient is at high risk for any endoscopic intervention given underlying comorbidities  - Conservative management as per primary team  - avoid constipation  - bowel regimen w/ senna and miralax  - order mesenteric doppler  - change to IV PPI BID and continue carafate 1g BID  - DVT treatment as per primary team 89 year old female with PMHx of HTN, DM, CKD, active smoker, COPD not on home O2 recent admission for aspiration PNA with incidental finding of distal DVT presented to the ED for hypoxia. Admitted for hypoxic respiratory failure 2/2 aspiration pneumonia. GI consulted for epigastric pain.    #Epigastric Burning/Discomfort, r/o PUD  #Chronic Anemia  - epigastric pain radiating to the back, associated with PO intake for the past 3 months along with nausea and vomiting  - severe constipation   - reports NSAID use occasionally  - Hgb 9.2, MCV 90  - iron studies in March 2023, slight low iron   - Lactate 1.3 (wnl), WBC 8K  - Lipase 18, GI and H pylori stool test negative in March  - CT A/P non cont 3/21/23: cholelithiasis, colonic diverticulosis   - KUB 4/11: copious stool in colon, non obstructive gas pattern    Plan:  - order mesenteric doppler  - change to IV PPI BID and continue carafate 1g BID  - DVT treatment as per primary team  - bowel regimen w/ senna and miralax, add tap water enema  - avoid constipation  - serial abdominal exams  - Patient is at high risk for any endoscopic intervention given underlying comorbidities  - continue conservative management as per primary team 89 year old female with PMHx of HTN, DM, CKD, active smoker, COPD not on home O2 recent admission for aspiration PNA with incidental finding of distal DVT presented to the ED for hypoxia.   Admitted for hypoxic respiratory failure 2/2 aspiration pneumonia. GI consulted for epigastric pain.    #Epigastric Burning/Discomfort, ddx includes PUD (active smoker) vs chronic mesenteric ischemia vs less likely biliary colic due to pain pattern vs less likely chronic pancreatitis)  #Chronic Anemia  #chronic constipation  - epigastric pain radiating to the back, associated with PO intake for the past 3 months along with nausea and vomiting  - severe constipation   - reports NSAID use occasionally  - Hgb 9.2, MCV 90  - iron studies in March 2023, slight low iron   - Lactate 1.3 (wnl), WBC 8K  - Lipase 18, GI and H pylori stool test negative in March  - CT A/P non cont 3/21/23: cholelithiasis, colonic diverticulosis   - KUB 4/11: copious stool in colon, non obstructive gas pattern    Plan:  - order mesenteric doppler to eval for mesenteric ischemia (cannot obtain CTA as pt has CKD)  - change to IV PPI BID and continue carafate 1g BID  - DVT treatment as per primary team  - bowel regimen w/ senna 12h and miralax q8h, add tap water enema q8h PRN  - GI soft diet as tolerated  - avoid constipation  - serial abdominal exams  - Patient is at high risk for any endoscopic intervention given underlying comorbidities  - continue conservative management as per primary team  - we will follow    plan d/w pt's daughter who was on the bedside 89 year old female with PMHx of HTN, DM, CKD, active smoker, COPD not on home O2 recent admission for aspiration PNA with incidental finding of distal DVT presented to the ED for hypoxia.   Admitted for hypoxic respiratory failure 2/2 aspiration pneumonia. GI consulted for epigastric pain.    #Epigastric Burning/Discomfort, ddx includes PUD (active smoker) vs chronic mesenteric ischemia vs less likely biliary colic due to pain pattern vs less likely chronic pancreatitis)  pt is a poor historian and also reports CP at times  #Chronic Anemia  #chronic constipation  - epigastric pain radiating to the back, associated with PO intake for the past 3 months along with nausea and vomiting  - severe constipation   - reports NSAID use occasionally  - Hgb 9.2, MCV 90  - iron studies in March 2023, slight low iron   - Lactate 1.3 (wnl), WBC 8K  - Lipase 18, GI and H pylori stool test negative in March  - CT A/P non cont 3/21/23: cholelithiasis, colonic diverticulosis   - KUB 4/11: copious stool in colon, non obstructive gas pattern    Plan:  - consider cardiology eval  - order mesenteric doppler to eval for mesenteric ischemia (cannot obtain CTA as pt has CKD)  - change to IV PPI BID and continue carafate 1g BID  - DVT treatment as per primary team  - bowel regimen w/ senna 12h and miralax q8h, add tap water enema q8h PRN  - GI soft diet as tolerated  - avoid constipation  - serial abdominal exams  - Patient is at high risk for any endoscopic intervention given underlying comorbidities  - continue conservative management as per primary team  - we will follow    plan d/w pt's daughter who was on the bedside

## 2023-04-13 LAB
ANION GAP SERPL CALC-SCNC: 9 MMOL/L — SIGNIFICANT CHANGE UP (ref 7–14)
BUN SERPL-MCNC: 51 MG/DL — HIGH (ref 10–20)
CALCIUM SERPL-MCNC: 9.4 MG/DL — SIGNIFICANT CHANGE UP (ref 8.4–10.5)
CHLORIDE SERPL-SCNC: 104 MMOL/L — SIGNIFICANT CHANGE UP (ref 98–110)
CO2 SERPL-SCNC: 22 MMOL/L — SIGNIFICANT CHANGE UP (ref 17–32)
CREAT SERPL-MCNC: 1.8 MG/DL — HIGH (ref 0.7–1.5)
EGFR: 26 ML/MIN/1.73M2 — LOW
GLUCOSE BLDC GLUCOMTR-MCNC: 111 MG/DL — HIGH (ref 70–99)
GLUCOSE BLDC GLUCOMTR-MCNC: 148 MG/DL — HIGH (ref 70–99)
GLUCOSE BLDC GLUCOMTR-MCNC: 170 MG/DL — HIGH (ref 70–99)
GLUCOSE BLDC GLUCOMTR-MCNC: 184 MG/DL — HIGH (ref 70–99)
GLUCOSE SERPL-MCNC: 144 MG/DL — HIGH (ref 70–99)
HCT VFR BLD CALC: 26.3 % — LOW (ref 37–47)
HGB BLD-MCNC: 8.2 G/DL — LOW (ref 12–16)
MAGNESIUM SERPL-MCNC: 2.1 MG/DL — SIGNIFICANT CHANGE UP (ref 1.8–2.4)
MCHC RBC-ENTMCNC: 28.5 PG — SIGNIFICANT CHANGE UP (ref 27–31)
MCHC RBC-ENTMCNC: 31.2 G/DL — LOW (ref 32–37)
MCV RBC AUTO: 91.3 FL — SIGNIFICANT CHANGE UP (ref 81–99)
NRBC # BLD: 0 /100 WBCS — SIGNIFICANT CHANGE UP (ref 0–0)
PLATELET # BLD AUTO: 470 K/UL — HIGH (ref 130–400)
PMV BLD: 9.5 FL — SIGNIFICANT CHANGE UP (ref 7.4–10.4)
POTASSIUM SERPL-MCNC: 4.9 MMOL/L — SIGNIFICANT CHANGE UP (ref 3.5–5)
POTASSIUM SERPL-SCNC: 4.9 MMOL/L — SIGNIFICANT CHANGE UP (ref 3.5–5)
RBC # BLD: 2.88 M/UL — LOW (ref 4.2–5.4)
RBC # FLD: 16.7 % — HIGH (ref 11.5–14.5)
SODIUM SERPL-SCNC: 135 MMOL/L — SIGNIFICANT CHANGE UP (ref 135–146)
WBC # BLD: 8.48 K/UL — SIGNIFICANT CHANGE UP (ref 4.8–10.8)
WBC # FLD AUTO: 8.48 K/UL — SIGNIFICANT CHANGE UP (ref 4.8–10.8)

## 2023-04-13 PROCEDURE — 99232 SBSQ HOSP IP/OBS MODERATE 35: CPT

## 2023-04-13 PROCEDURE — 74018 RADEX ABDOMEN 1 VIEW: CPT | Mod: 26

## 2023-04-13 PROCEDURE — 93978 VASCULAR STUDY: CPT | Mod: 26

## 2023-04-13 PROCEDURE — 99233 SBSQ HOSP IP/OBS HIGH 50: CPT

## 2023-04-13 PROCEDURE — 74018 RADEX ABDOMEN 1 VIEW: CPT | Mod: 26,77

## 2023-04-13 RX ORDER — PANTOPRAZOLE SODIUM 20 MG/1
40 TABLET, DELAYED RELEASE ORAL
Refills: 0 | Status: DISCONTINUED | OUTPATIENT
Start: 2023-04-13 | End: 2023-04-14

## 2023-04-13 RX ORDER — SENNA PLUS 8.6 MG/1
2 TABLET ORAL EVERY 12 HOURS
Refills: 0 | Status: DISCONTINUED | OUTPATIENT
Start: 2023-04-13 | End: 2023-04-19

## 2023-04-13 RX ORDER — PANTOPRAZOLE SODIUM 20 MG/1
40 TABLET, DELAYED RELEASE ORAL
Refills: 0 | Status: DISCONTINUED | OUTPATIENT
Start: 2023-04-13 | End: 2023-04-13

## 2023-04-13 RX ORDER — LACTULOSE 10 G/15ML
10 SOLUTION ORAL EVERY 12 HOURS
Refills: 0 | Status: DISCONTINUED | OUTPATIENT
Start: 2023-04-13 | End: 2023-04-19

## 2023-04-13 RX ORDER — POLYETHYLENE GLYCOL 3350 17 G/17G
17 POWDER, FOR SOLUTION ORAL
Refills: 0 | Status: DISCONTINUED | OUTPATIENT
Start: 2023-04-13 | End: 2023-04-13

## 2023-04-13 RX ORDER — POLYETHYLENE GLYCOL 3350 17 G/17G
17 POWDER, FOR SOLUTION ORAL
Refills: 0 | Status: DISCONTINUED | OUTPATIENT
Start: 2023-04-13 | End: 2023-04-19

## 2023-04-13 RX ADMIN — Medication 1 GRAM(S): at 17:07

## 2023-04-13 RX ADMIN — INSULIN GLARGINE 10 UNIT(S): 100 INJECTION, SOLUTION SUBCUTANEOUS at 09:24

## 2023-04-13 RX ADMIN — POLYETHYLENE GLYCOL 3350 17 GRAM(S): 17 POWDER, FOR SOLUTION ORAL at 22:10

## 2023-04-13 RX ADMIN — Medication 3 UNIT(S): at 17:08

## 2023-04-13 RX ADMIN — HEPARIN SODIUM 5000 UNIT(S): 5000 INJECTION INTRAVENOUS; SUBCUTANEOUS at 06:03

## 2023-04-13 RX ADMIN — ATORVASTATIN CALCIUM 20 MILLIGRAM(S): 80 TABLET, FILM COATED ORAL at 22:09

## 2023-04-13 RX ADMIN — Medication 3 UNIT(S): at 09:21

## 2023-04-13 RX ADMIN — Medication 650 MILLIGRAM(S): at 14:05

## 2023-04-13 RX ADMIN — Medication 3 MILLILITER(S): at 08:04

## 2023-04-13 RX ADMIN — CHLORHEXIDINE GLUCONATE 1 APPLICATION(S): 213 SOLUTION TOPICAL at 06:03

## 2023-04-13 RX ADMIN — Medication 30 MILLILITER(S): at 15:11

## 2023-04-13 RX ADMIN — Medication 5 MILLIGRAM(S): at 22:09

## 2023-04-13 RX ADMIN — PANTOPRAZOLE SODIUM 40 MILLIGRAM(S): 20 TABLET, DELAYED RELEASE ORAL at 06:02

## 2023-04-13 RX ADMIN — PANTOPRAZOLE SODIUM 40 MILLIGRAM(S): 20 TABLET, DELAYED RELEASE ORAL at 17:07

## 2023-04-13 RX ADMIN — Medication 1 GRAM(S): at 06:03

## 2023-04-13 RX ADMIN — Medication 3 UNIT(S): at 12:22

## 2023-04-13 RX ADMIN — HEPARIN SODIUM 5000 UNIT(S): 5000 INJECTION INTRAVENOUS; SUBCUTANEOUS at 22:09

## 2023-04-13 RX ADMIN — Medication 3 MILLILITER(S): at 19:45

## 2023-04-13 RX ADMIN — HYDROMORPHONE HYDROCHLORIDE 0.25 MILLIGRAM(S): 2 INJECTION INTRAMUSCULAR; INTRAVENOUS; SUBCUTANEOUS at 20:27

## 2023-04-13 RX ADMIN — SENNA PLUS 2 TABLET(S): 8.6 TABLET ORAL at 17:07

## 2023-04-13 RX ADMIN — AMLODIPINE BESYLATE 2.5 MILLIGRAM(S): 2.5 TABLET ORAL at 06:03

## 2023-04-13 RX ADMIN — HEPARIN SODIUM 5000 UNIT(S): 5000 INJECTION INTRAVENOUS; SUBCUTANEOUS at 14:06

## 2023-04-13 RX ADMIN — Medication 650 MILLIGRAM(S): at 15:05

## 2023-04-13 RX ADMIN — Medication 1: at 09:23

## 2023-04-13 NOTE — PROGRESS NOTE ADULT - ASSESSMENT
89 year old female with PMHx of HTN, DM, CKD, active smoker, COPD not on home O2 recent admission for aspiration PNA with incidental finding of distal DVT presented to the ED for hypoxia    #Acute Hypoxic respiratory failure likely 2/2 asp PNA/Bilateral with sepsis POA  - CT angio: no PE but B/L lower lobe and R middle lobe consolidations   - COPD not on home 02  - Active smoker   - Recent Distal DVT  - intermittently on 2 LNC  - RVP, strep, legionella, bcx and MRSA negative   - procal 0.14  - s/p 7d course of cefepime and levaquin, completed 4/9  - c/w with prednisone taper, prednisone 10mg to complete on 4/12  - echo ef 55%, GIDD, mod MS, mild AS   - diet per speech and swallow   - PT  - pt requests to be on NC for comfort  #Dyspepsia  #Peptic Ulcer Disease  #Hiatal hernia  #Intermittent epigastric/LUQ/back pain  - TTP  - KUB: copious stool seen throughout the colon. nonobstructive bowel gas pattern. suggestion of contrast in the stomach  - troponin 0.01  - lactate 1.3 -> 1.2  - GI consult: high risk for endoscopy, c/w conservative management  - bowel regimen miralax, senna, tap water enema   - f/u mesenteric doppler  - change PPI to IV BID, c/w carafate 1g BID    #LLE distal DVT   - Duplex (3/24/23) noted thrombus in left gastrocnemius and peroneal veins  - repeat duplex unchanged, was seen by vascular on recent admission  - was started on Eliquis given pt is not ambulating and remains on bed but pt has hx of PUD, and c/o of intermittent epigastric burn/ discomfort, would be at risk of GIB, daughter Meena Powell (at bedside) decided to hold on AC    # hyponatremia - stable  - HCTZ on hold    #CKD III  - cr at baseline  - keep off lisinopril   - was following with Nephrology in [place]  - encourage PO intake    #HTN  - was on amlodipine 2.5 mg daily, HCTZ 12.5mg daily and lisinopril 10mg daily in assisted living   - holding HCTZ and lisinopril     #DM  - takes pioglitazone 15mg daily  - c/w insulin lantus 10U at bedtime, lispro 3U TID; monitor FS  - A1c 7.4% (3/2023)    DVT pp: heparin  GI ppx: protonix  Diet: Soft and bite sized/DASH/CC  Code: DNR/DNI  Pending: [facility], GI consult, oxygen arrangement if needed 89 year old female with PMHx of HTN, DM, CKD, active smoker, COPD not on home O2 recent admission for aspiration PNA with incidental finding of distal DVT presented to the ED for hypoxia    #Acute Hypoxic respiratory failure likely 2/2 asp PNA/Bilateral with sepsis POA  - CT angio: no PE but B/L lower lobe and R middle lobe consolidations   - COPD not on home 02  - Active smoker   - Recent Distal DVT  - intermittently on 2 LNC  - RVP, strep, legionella, bcx and MRSA negative   - procal 0.14  - s/p 7d course of cefepime and levaquin, completed 4/9  - c/w with prednisone taper, prednisone 10mg to complete on 4/12  - echo ef 55%, GIDD, mod MS, mild AS   - diet per speech and swallow   - pt requests to be on NC for comfort    #Dyspepsia  #Peptic Ulcer Disease  #Hiatal hernia  #Intermittent epigastric/LUQ/back pain  - TTP - improving  - KUB: copious stool seen throughout the colon. nonobstructive bowel gas pattern. suggestion of contrast in the stomach  - troponin 0.01  - lactate 1.3 -> 1.2  - GI consult: high risk for endoscopy, c/w conservative management  - bowel regimen miralax, senna, tap water enema, lactulose prn   - changed PPI to IV BID, c/w carafate 1g BID  - f/u mesenteric doppler    #LLE distal DVT   - Duplex (3/24/23) noted thrombus in left gastrocnemius and peroneal veins  - repeat duplex unchanged, was seen by vascular on recent admission  - was started on Eliquis given pt is not ambulating and remains on bed but pt has hx of PUD, and c/o of intermittent epigastric burn/ discomfort, would be at risk of GIB, daughter Meena Powell (at bedside) decided to hold on AC    # hyponatremia - stable  - HCTZ on hold    #CKD III  - cr at baseline  - keep off lisinopril   - was following with Nephrology in [place]  - encourage PO intake    #HTN  - was on amlodipine 2.5 mg daily, HCTZ 12.5mg daily and lisinopril 10mg daily in assisted living   - holding HCTZ and lisinopril     #DM  - takes pioglitazone 15mg daily  - c/w insulin lantus 10U at bedtime, lispro 3U TID; monitor FS  - A1c 7.4% (3/2023)    DVT pp: heparin  GI ppx: protonix  Diet: Soft and bite sized/DASH/CC  Code: DNR/DNI  Pending: [facility], GI following, oxygen arrangement if needed 89 year old female with PMHx of HTN, DM, CKD, active smoker, COPD not on home O2 recent admission for aspiration PNA with incidental finding of distal DVT presented to the ED for hypoxia    #Acute Hypoxic respiratory failure likely 2/2 asp PNA/Bilateral with sepsis POA  - CT angio: no PE but B/L lower lobe and R middle lobe consolidations   - COPD not on home 02  - Active smoker   - Recent Distal DVT  - intermittently on 2 LNC  - RVP, strep, legionella, bcx and MRSA negative   - procal 0.14  - s/p 7d course of cefepime and levaquin, completed 4/9  - c/w with prednisone taper, prednisone 10mg to complete on 4/12  - echo ef 55%, GIDD, mod MS, mild AS   - diet per speech and swallow   - pt requests to be on NC for comfort    #Dyspepsia  #Peptic Ulcer Disease  #Hiatal hernia  #Intermittent epigastric/LUQ/back pain  - TTP - improving  - KUB: copious stool seen throughout the colon. nonobstructive bowel gas pattern. suggestion of contrast in the stomach  - troponin 0.01  - lactate 1.3 -> 1.2  - GI consult: high risk for endoscopy, c/w conservative management  - bowel regimen miralax, senna, tap water enema, lactulose prn   - changed PPI to IV BID, c/w carafate 1g BID  - mesenteric doppler: Nonconclusive examination of aorta and mesenteric vessels due to presence of bowel gas      #LLE distal DVT   - Duplex (3/24/23) noted thrombus in left gastrocnemius and peroneal veins  - repeat duplex unchanged, was seen by vascular on recent admission  - was started on Eliquis given pt is not ambulating and remains on bed but pt has hx of PUD, and c/o of intermittent epigastric burn/ discomfort, would be at risk of GIB, daughter Meena Powell (at bedside) decided to hold on AC    # hyponatremia - stable  - HCTZ on hold    #CKD III  - cr at baseline  - keep off lisinopril   - was following with Nephrology in [place]  - encourage PO intake    #HTN  - was on amlodipine 2.5 mg daily, HCTZ 12.5mg daily and lisinopril 10mg daily in assisted living   - holding HCTZ and lisinopril     #DM  - takes pioglitazone 15mg daily  - c/w insulin lantus 10U at bedtime, lispro 3U TID; monitor FS  - A1c 7.4% (3/2023)    DVT pp: heparin  GI ppx: protonix  Diet: Soft and bite sized/DASH/CC  Code: DNR/DNI  Pending: [facility], GI following, oxygen arrangement if needed 89 year old female with PMHx of HTN, DM, CKD, active smoker, COPD not on home O2 recent admission for aspiration PNA with incidental finding of distal DVT presented to the ED for hypoxia    #Acute Hypoxic respiratory failure likely 2/2 asp PNA/Bilateral with sepsis POA  - CT angio: no PE but B/L lower lobe and R middle lobe consolidations   - COPD not on home 02  - Active smoker   - Recent Distal DVT  - intermittently on 2 LNC  - RVP, strep, legionella, bcx and MRSA negative   - procal 0.14  - s/p 7d course of cefepime and levaquin, completed 4/9  - c/w with prednisone taper, prednisone 10mg to complete on 4/12  - echo ef 55%, GIDD, mod MS, mild AS   - diet per speech and swallow   - pt requests to be on NC for comfort    #Dyspepsia  #Peptic Ulcer Disease  #Hiatal hernia  #Intermittent epigastric/LUQ/back pain  - TTP - improving  - KUB: copious stool seen throughout the colon. nonobstructive bowel gas pattern. suggestion of contrast in the stomach  - troponin 0.01  - lactate 1.3 -> 1.2  - GI consult: high risk for endoscopy, c/w conservative management  - bowel regimen miralax, senna, tap water enema q8 prn, lactulose prn   - changed PPI to IV BID, c/w carafate 1g BID  - mesenteric doppler: Nonconclusive examination of aorta and mesenteric vessels due to presence of bowel gas      #LLE distal DVT   - Duplex (3/24/23) noted thrombus in left gastrocnemius and peroneal veins  - repeat duplex unchanged, was seen by vascular on recent admission  - was started on Eliquis given pt is not ambulating and remains on bed but pt has hx of PUD, and c/o of intermittent epigastric burn/ discomfort, would be at risk of GIB, daughter Meena Powell (at bedside) decided to hold on AC    # hyponatremia - stable  - HCTZ on hold    #CKD III  - cr at baseline  - keep off lisinopril   - was following with Nephrology in [place]  - encourage PO intake    #HTN  - was on amlodipine 2.5 mg daily, HCTZ 12.5mg daily and lisinopril 10mg daily in assisted living   - holding HCTZ and lisinopril     #DM  - takes pioglitazone 15mg daily  - c/w insulin lantus 10U at bedtime, lispro 3U TID; monitor FS  - A1c 7.4% (3/2023)    DVT pp: heparin  GI ppx: protonix  Diet: Soft and bite sized/DASH/CC  Code: DNR/DNI  Pending: [facility], GI following, oxygen arrangement if needed 89 year old female with PMHx of HTN, DM, CKD, active smoker, COPD not on home O2 recent admission for aspiration PNA with incidental finding of distal DVT presented to the ED for hypoxia    #Acute Hypoxic respiratory failure likely 2/2 asp PNA/Bilateral with sepsis POA  - CT angio: no PE but B/L lower lobe and R middle lobe consolidations   - COPD not on home 02  - Active smoker   - Recent Distal DVT  - intermittently on 2 LNC  - RVP, strep, legionella, bcx and MRSA negative   - procal 0.14  - s/p 7d course of cefepime and levaquin, completed 4/9  - c/w with prednisone taper, prednisone 10mg to complete on 4/12  - echo ef 55%, GIDD, mod MS, mild AS   - diet per speech and swallow   - pt requests to be on NC for comfort    #Dyspepsia  #Peptic Ulcer Disease  #Hiatal hernia  #Intermittent epigastric/LUQ/back pain  - TTP - improving  - KUB: copious stool seen throughout the colon. nonobstructive bowel gas pattern. suggestion of contrast in the stomach  - troponin 0.01  - lactate 1.3 -> 1.2  - GI consult: high risk for endoscopy, c/w conservative management  - bowel regimen miralax, senna, tap water enema q8 prn, lactulose prn   - changed PPI to IV BID, c/w carafate 1g BID  - mesenteric doppler: Nonconclusive examination of aorta and mesenteric vessels due to presence of bowel gas  - can repeat mesenteric doppler after pt has additional BM  - obtain KUB      #LLE distal DVT   - Duplex (3/24/23) noted thrombus in left gastrocnemius and peroneal veins  - repeat duplex unchanged, was seen by vascular on recent admission  - was started on Eliquis given pt is not ambulating and remains on bed but pt has hx of PUD, and c/o of intermittent epigastric burn/ discomfort, would be at risk of GIB, daughter Meena Powell (at bedside) decided to hold on AC    # hyponatremia - stable  - HCTZ on hold    #CKD III  - cr at baseline  - keep off lisinopril   - was following with Nephrology in [place]  - encourage PO intake    #HTN  - was on amlodipine 2.5 mg daily, HCTZ 12.5mg daily and lisinopril 10mg daily in assisted living   - holding HCTZ and lisinopril     #DM  - takes pioglitazone 15mg daily  - c/w insulin lantus 10U at bedtime, lispro 3U TID; monitor FS  - A1c 7.4% (3/2023)    DVT pp: heparin  GI ppx: protonix  Diet: Soft and bite sized/DASH/CC  Code: DNR/DNI  Pending: [facility], GI following, oxygen arrangement if needed 89 year old female with PMHx of HTN, DM, CKD, active smoker, COPD not on home O2 recent admission for aspiration PNA with incidental finding of distal DVT presented to the ED for hypoxia    #Acute Hypoxic respiratory failure likely 2/2 asp PNA/Bilateral with sepsis POA  - CT angio: no PE but B/L lower lobe and R middle lobe consolidations   - COPD not on home 02  - Active smoker   - Recent Distal DVT  - intermittently on 2 LNC  - RVP, strep, legionella, bcx and MRSA negative   - procal 0.14  - s/p 7d course of cefepime and levaquin, completed 4/9  - c/w with prednisone taper, prednisone 10mg to complete on 4/12  - echo ef 55%, GIDD, mod MS, mild AS   - diet per speech and swallow   - PT: walked 3 steps desat to 77% on RA, returned to 96% on 4L NC    #Dyspepsia  #Peptic Ulcer Disease  #Hiatal hernia  #Intermittent epigastric/LUQ/back pain  - TTP - improving  - KUB: copious stool seen throughout the colon. nonobstructive bowel gas pattern. suggestion of contrast in the stomach  - troponin 0.01  - lactate 1.3 -> 1.2  - GI consult: high risk for endoscopy, c/w conservative management  - bowel regimen miralax, senna, tap water enema q8 prn, lactulose prn   - changed PPI to IV BID, c/w carafate 1g BID  - mesenteric doppler: Nonconclusive examination of aorta and mesenteric vessels due to presence of bowel gas  - can repeat mesenteric doppler after pt has additional BM  - obtain KUB    #LLE distal DVT   - Duplex (3/24/23) noted thrombus in left gastrocnemius and peroneal veins  - repeat duplex unchanged, was seen by vascular on recent admission  - was started on Eliquis given pt is not ambulating and remains on bed but pt has hx of PUD, and c/o of intermittent epigastric burn/ discomfort, would be at risk of GIB, daughter Meena Powell (at bedside) decided to hold on AC    # hyponatremia - stable  - HCTZ on hold    #CKD III  - cr at baseline  - keep off lisinopril   - was following with Nephrology in [place]  - encourage PO intake    #HTN  - was on amlodipine 2.5 mg daily, HCTZ 12.5mg daily and lisinopril 10mg daily in assisted living   - holding HCTZ and lisinopril     #DM  - takes pioglitazone 15mg daily  - c/w insulin lantus 10U at bedtime, lispro 3U TID; monitor FS  - A1c 7.4% (3/2023)    DVT pp: heparin  GI ppx: protonix  Diet: Soft and bite sized/DASH/CC  Code: DNR/DNI  Pending: [facility], GI following, oxygen arrangement if needed 89 year old female with PMHx of HTN, DM, CKD, active smoker, COPD not on home O2 recent admission for aspiration PNA with incidental finding of distal DVT presented to the ED for hypoxia    #Acute Hypoxic respiratory failure likely 2/2 asp PNA/Bilateral with sepsis POA  - CT angio: no PE but B/L lower lobe and R middle lobe consolidations   - COPD not on home 02  - Active smoker   - Recent Distal DVT  - intermittently on 2 LNC  - RVP, strep, legionella, bcx and MRSA negative   - procal 0.14  - s/p 7d course of cefepime and levaquin, completed 4/9  - c/w with prednisone taper, prednisone 10mg to complete on 4/12  - echo ef 55%, GIDD, mod MS, mild AS   - diet per speech and swallow   - PT: walked 3 steps desat to 77% on 1L NC, returned to 96% on 4L NC    #Dyspepsia  #Peptic Ulcer Disease  #Hiatal hernia  #Intermittent epigastric/LUQ/back pain  - TTP - improving  - KUB: copious stool seen throughout the colon. nonobstructive bowel gas pattern. suggestion of contrast in the stomach  - troponin 0.01  - lactate 1.3 -> 1.2  - GI consult: high risk for endoscopy, c/w conservative management  - bowel regimen miralax, senna, tap water enema q8 prn, lactulose prn   - changed PPI to IV BID, c/w carafate 1g BID  - mesenteric doppler: Nonconclusive examination of aorta and mesenteric vessels due to presence of bowel gas  - can repeat mesenteric doppler after pt has additional BM  - obtain KUB    #LLE distal DVT   - Duplex (3/24/23) noted thrombus in left gastrocnemius and peroneal veins  - repeat duplex unchanged, was seen by vascular on recent admission  - was started on Eliquis given pt is not ambulating and remains on bed but pt has hx of PUD, and c/o of intermittent epigastric burn/ discomfort, would be at risk of GIB, daughter Meena Powell (at bedside) decided to hold on AC    # hyponatremia - stable  - HCTZ on hold    #CKD III  - cr at baseline  - keep off lisinopril   - was following with Nephrology in [place]  - encourage PO intake    #HTN  - was on amlodipine 2.5 mg daily, HCTZ 12.5mg daily and lisinopril 10mg daily in assisted living   - holding HCTZ and lisinopril     #DM  - takes pioglitazone 15mg daily  - c/w insulin lantus 10U at bedtime, lispro 3U TID; monitor FS  - A1c 7.4% (3/2023)    DVT pp: heparin  GI ppx: protonix  Diet: Soft and bite sized/DASH/CC  Code: DNR/DNI  Pending: [facility], GI following, oxygen arrangement if needed

## 2023-04-13 NOTE — PROGRESS NOTE ADULT - ASSESSMENT
89 year old female with PMHx of HTN, DM, CKD, active smoker, COPD not on home O2 recent admission for aspiration PNA with incidental finding of distal DVT presented to the ED for hypoxia.   Admitted for hypoxic respiratory failure 2/2 aspiration pneumonia. GI consulted for epigastric pain.    #Epigastric Burning/Discomfort, ddx includes PUD (active smoker) vs chronic mesenteric ischemia vs less likely biliary colic due to pain pattern vs less likely chronic pancreatitis)  pt is a poor historian and also reports CP at times  #Chronic Anemia  #chronic constipation  - epigastric pain radiating to the back, associated with PO intake for the past 3 months along with nausea and vomiting  - severe constipation   - reports NSAID use occasionally  - Hgb 9.2, MCV 90  - iron studies in March 2023, slight low iron   - Lactate 1.3 (wnl), WBC 8K  - Lipase 18, GI and H pylori stool test negative in March  - CT A/P non cont 3/21/23: cholelithiasis, colonic diverticulosis   - KUB 4/11: copious stool in colon, non obstructive gas pattern  - Mesenteric doppler 4/13: nonconclusive exam due to bowel gas    Plan:  - check KUB  - consider cardiology eval  - change IV PPI BID and carafate 1g BID  - DVT treatment as per primary team  - bowel regimen w/ senna 12h and miralax q8h, can add tap water enema q8h PRN  - repeat mesenteric doppler to eval for mesenteric ischemia once patient has become less distended (cannot obtain CTA as pt has CKD)  - soft diet as tolerated  - avoid constipation  - serial abdominal exams  - Patient is at high risk for any endoscopic intervention given underlying comorbidities  - continue conservative management as per primary team  - we will follow

## 2023-04-13 NOTE — PROGRESS NOTE ADULT - ATTENDING COMMENTS
Patient seen at bedside. Changes made within note as well. Discussed with medical team that includes resident(s), medical student(s), nursing staff, case management.     89 year old female with PMHx of HTN, DM, CKD, active smoker, COPD not on home O2 recent admission for aspiration PNA with incidental finding of distal DVT presented to the ED for hypoxia    #Acute Hypoxic respiratory failure likely 2/2 asp PNA/Bilateral with sepsis POA, improved   - CT angio: no PE but B/L lower lobe and R middle lobe consolidations   - COPD not on home 02  - Active smoker   - Recent Distal DVT  - intermittently on 2 LNC  - RVP, strep, legionella, bcx and MRSA negative   - procal 0.14  - s/p 7d course of cefepime and levaquin, completed 4/9  - s/p prednisone   - echo ef 55%, GIDD, mod MS, mild AS   - diet per speech and swallow   - PT  - [facility]  - may need oxygen on discharge if desaturating   patient not willing to ambulate to ambulatory oxygen was not checked     #LLE distal DVT   - Duplex (3/24/23) noted thrombus in left gastrocnemius and peroneal veins  - repeat duplex unchanged, was seen by vascular on recent admission  - was started on Eliquis given pt is not ambulating and remains on bed but pt has hx of PUD, and c/o of intermittent epigastric burn/ discomfort, would be at risk of GIB, daughter Meena Powell (at bedside) decided to hold on AC  family to decide regaridng restart of AC. need to discuss with GI before restarting AC     # hyponatremia - stable  - HCTZ on hold    #Dyspepsia  #hiatal hernia  #Peptic Ulcer Disease  - c/w protonix BID and carafate   patient reported intermittent epigastric pain   as per family who was present at bedside mentioned that patient was suppose to have EGD done as outpatient  also reported that prior history of hiatal hernia?  consulted GI, will follow up recommendations ,   no intervention, aggressive bowel regimen, follow recommendations   mesenteric duplex was ordered. result inconcludsive.  likely need repeat mesenteric duplex,   xray kub ordered pending     #CKD III  - cr at baseline  - keep off lisinopril   - was following with Nephrology in [place]  - encourage PO intake    #HTN  - was on amlodipine 2.5 mg daily, HCTZ 12.5mg daily and lisinopril 10mg daily in assisted living   - holding HCTZ and lisinopril     #DM  - takes pioglitazone 15mg daily  - c/w insulin lantus 10U at bedtime, lispro 3U TID; monitor FS  - A1c 7.4% (3/2023)    DVT pp: heparin  GI ppx: protonix  Diet: Soft and bite sized/DASH/CC  Code: DNR/DNI  P[facility], follow GI recommendations, bowel regimen, monitor oxygenation, if patient requires oxygen need to arrange oxygen before discharge ., follow GI . mesenteric duplex was ordered. result inconclusive.  likely need repeat mesenteric duplex,   xray kub ordered pending   bowel regimen    medical team updated family. family to decide regarding resumtpion of anticoagulation Patient seen at bedside. Changes made within note as well. Discussed with medical team that includes resident(s), medical student(s), nursing staff, case management.     89 year old female with PMHx of HTN, DM, CKD, active smoker, COPD not on home O2 recent admission for aspiration PNA with incidental finding of distal DVT presented to the ED for hypoxia    #Acute Hypoxic respiratory failure likely 2/2 asp PNA/Bilateral with sepsis POA, improved   - CT angio: no PE but B/L lower lobe and R middle lobe consolidations   - COPD not on home 02  - Active smoker   - Recent Distal DVT  - intermittently on 2 LNC  - RVP, strep, legionella, bcx and MRSA negative   - procal 0.14  - s/p 7d course of cefepime and levaquin, completed 4/9  - s/p prednisone   - echo ef 55%, GIDD, mod MS, mild AS   - diet per speech and swallow   - PT  - [facility]  - may need oxygen on discharge if desaturating   patient not willing to ambulate to ambulatory oxygen was not checked     #LLE distal DVT   - Duplex (3/24/23) noted thrombus in left gastrocnemius and peroneal veins  - repeat duplex unchanged, was seen by vascular on recent admission  - was started on Eliquis given pt is not ambulating and remains on bed but pt has hx of PUD, and c/o of intermittent epigastric burn/ discomfort, would be at risk of GIB, daughter Meena Powell (at bedside) decided to hold on AC  family to decide regaridng restart of AC. need to discuss with GI before restarting AC     # hyponatremia - stable  - HCTZ on hold    #Dyspepsia  #hiatal hernia  #Peptic Ulcer Disease  - c/w protonix BID and carafate   patient reported intermittent epigastric pain   as per family who was present at bedside mentioned that patient was suppose to have EGD done as outpatient  also reported that prior history of hiatal hernia?  consulted GI, will follow up recommendations ,   no intervention, aggressive bowel regimen, follow recommendations   mesenteric duplex was ordered. result inconcludsive.  likely need repeat mesenteric duplex,   xray kub ordered pending     #CKD III  - cr at baseline  - keep off lisinopril   - was following with Nephrology in [place]  - encourage PO intake    #HTN  - was on amlodipine 2.5 mg daily, HCTZ 12.5mg daily and lisinopril 10mg daily in assisted living   - holding HCTZ and lisinopril     #DM  - takes pioglitazone 15mg daily  - c/w insulin lantus 10U at bedtime, lispro 3U TID; monitor FS  - A1c 7.4% (3/2023)    DVT pp: heparin  GI ppx: protonix  Diet: Soft and bite sized/DASH/CC  Code: DNR/DNI    Follow up: dc to [facility] when clinically stable, follow GI recommendations, bowel regimen, monitor oxygenation, if patient requires oxygen need to arrange oxygen before discharge ., follow GI . mesenteric duplex was ordered. result inconclusive.  likely need repeat mesenteric duplex,   xray kub ordered pending   bowel regimen  consider cardiology consult . (last troponin negative which were done when patient was having epigastric discomfort to rule out ACS)    medical team updated family. family to decide regarding resumtpion of anticoagulation

## 2023-04-13 NOTE — PROGRESS NOTE ADULT - SUBJECTIVE AND OBJECTIVE BOX
No acute events overnight. Said her abd/back pain is still present but is improved. She had 2 "very large" bowel movements overnight and she said she started to feel better after that.     VITAL SIGNS:  T(C): 36.6 (04-13-23 @ 05:00), Max: 37.3 (04-12-23 @ 12:19)  HR: 87 (04-13-23 @ 05:00) (87 - 118)  BP: 138/85 (04-13-23 @ 05:00) (124/71 - 148/78)  RR: 18 (04-13-23 @ 05:00) (17 - 18)  SpO2: 98% (04-13-23 @ 05:00) (97% - 100%)      PHYSICAL EXAM:  GENERAL: NAD  HEAD: Atraumatic, Normocephalic  NERVOUS SYSTEM: hard of hearing, answers questions appropriately; Moves all ext  CHEST/LUNG: coarse bs  HEART: Regular rate and rhythm. Normal S1/S1. No murmurs, rubs, or gallops  ABDOMEN: TTP epigastric, under left breast. Soft, Nondistended; Bowel sounds present  EXTREMITIES: no ble edema      MEDICATIONS:  MEDICATIONS  (STANDING):  albuterol/ipratropium for Nebulization 3 milliLiter(s) Nebulizer every 12 hours  amLODIPine   Tablet 2.5 milliGRAM(s) Oral daily  atorvastatin 20 milliGRAM(s) Oral at bedtime  budesonide  80 MICROgram(s)/formoterol 4.5 MICROgram(s) Inhaler 2 Puff(s) Inhalation two times a day  chlorhexidine 2% Cloths 1 Application(s) Topical <User Schedule>  dextrose 5%. 1000 milliLiter(s) (100 mL/Hr) IV Continuous <Continuous>  dextrose 5%. 1000 milliLiter(s) (50 mL/Hr) IV Continuous <Continuous>  dextrose 50% Injectable 25 Gram(s) IV Push once  dextrose 50% Injectable 12.5 Gram(s) IV Push once  dextrose 50% Injectable 25 Gram(s) IV Push once  glucagon  Injectable 1 milliGRAM(s) IntraMuscular once  heparin   Injectable 5000 Unit(s) SubCutaneous every 8 hours  insulin glargine Injectable (LANTUS) 10 Unit(s) SubCutaneous every morning  insulin lispro (ADMELOG) corrective regimen sliding scale   SubCutaneous three times a day before meals  insulin lispro (ADMELOG) corrective regimen sliding scale   SubCutaneous at bedtime  insulin lispro Injectable (ADMELOG) 3 Unit(s) SubCutaneous three times a day before meals  melatonin 5 milliGRAM(s) Oral at bedtime  pantoprazole  Injectable 40 milliGRAM(s) IV Push two times a day  senna 2 Tablet(s) Oral at bedtime  sucralfate 1 Gram(s) Oral two times a day    MEDICATIONS  (PRN):  acetaminophen     Tablet .. 650 milliGRAM(s) Oral every 6 hours PRN Moderate Pain (4 - 6)  albuterol/ipratropium for Nebulization 3 milliLiter(s) Nebulizer every 4 hours PRN Shortness of Breath and/or Wheezing  aluminum hydroxide/magnesium hydroxide/simethicone Suspension 30 milliLiter(s) Oral every 4 hours PRN Dyspepsia  dextrose Oral Gel 15 Gram(s) Oral once PRN Blood Glucose LESS THAN 70 milliGRAM(s)/deciliter  HYDROmorphone  Injectable 0.25 milliGRAM(s) IV Push once PRN Severe Pain (7 - 10)  polyethylene glycol 3350 17 Gram(s) Oral daily PRN Constipation      LABS:                        8.2    8.48  )-----------( 470      ( 13 Apr 2023 07:17 )             26.3     04-13    135  |  104  |  51<H>  ----------------------------<  144<H>  4.9   |  22  |  1.8<H>    Ca    9.4      13 Apr 2023 07:17  Mg     2.1     04-13             No acute events overnight. Said her abd/back pain is still present but is improved. She had 2 "very large" bowel movements overnight and she said she started to feel better after that.     VITAL SIGNS:  T(C): 36.6 (04-13-23 @ 05:00), Max: 37.3 (04-12-23 @ 12:19)  HR: 87 (04-13-23 @ 05:00) (87 - 118)  BP: 138/85 (04-13-23 @ 05:00) (124/71 - 148/78)  RR: 18 (04-13-23 @ 05:00) (17 - 18)  SpO2: 98% (04-13-23 @ 05:00) (97% - 100%)      PHYSICAL EXAM:  GENERAL: NAD  HEAD: Atraumatic, Normocephalic  NERVOUS SYSTEM: hard of hearing, answers questions appropriately; Moves all ext  CHEST/LUNG: coarse bs  HEART: Regular rate and rhythm. Normal S1/S1. No murmurs, rubs, or gallops  ABDOMEN: TTP epigastric, under left breast improved. Soft, Nondistended; Bowel sounds present  EXTREMITIES: no ble edema      MEDICATIONS:  MEDICATIONS  (STANDING):  albuterol/ipratropium for Nebulization 3 milliLiter(s) Nebulizer every 12 hours  amLODIPine   Tablet 2.5 milliGRAM(s) Oral daily  atorvastatin 20 milliGRAM(s) Oral at bedtime  budesonide  80 MICROgram(s)/formoterol 4.5 MICROgram(s) Inhaler 2 Puff(s) Inhalation two times a day  chlorhexidine 2% Cloths 1 Application(s) Topical <User Schedule>  dextrose 5%. 1000 milliLiter(s) (100 mL/Hr) IV Continuous <Continuous>  dextrose 5%. 1000 milliLiter(s) (50 mL/Hr) IV Continuous <Continuous>  dextrose 50% Injectable 25 Gram(s) IV Push once  dextrose 50% Injectable 12.5 Gram(s) IV Push once  dextrose 50% Injectable 25 Gram(s) IV Push once  glucagon  Injectable 1 milliGRAM(s) IntraMuscular once  heparin   Injectable 5000 Unit(s) SubCutaneous every 8 hours  insulin glargine Injectable (LANTUS) 10 Unit(s) SubCutaneous every morning  insulin lispro (ADMELOG) corrective regimen sliding scale   SubCutaneous three times a day before meals  insulin lispro (ADMELOG) corrective regimen sliding scale   SubCutaneous at bedtime  insulin lispro Injectable (ADMELOG) 3 Unit(s) SubCutaneous three times a day before meals  melatonin 5 milliGRAM(s) Oral at bedtime  pantoprazole  Injectable 40 milliGRAM(s) IV Push two times a day  senna 2 Tablet(s) Oral at bedtime  sucralfate 1 Gram(s) Oral two times a day    MEDICATIONS  (PRN):  acetaminophen     Tablet .. 650 milliGRAM(s) Oral every 6 hours PRN Moderate Pain (4 - 6)  albuterol/ipratropium for Nebulization 3 milliLiter(s) Nebulizer every 4 hours PRN Shortness of Breath and/or Wheezing  aluminum hydroxide/magnesium hydroxide/simethicone Suspension 30 milliLiter(s) Oral every 4 hours PRN Dyspepsia  dextrose Oral Gel 15 Gram(s) Oral once PRN Blood Glucose LESS THAN 70 milliGRAM(s)/deciliter  HYDROmorphone  Injectable 0.25 milliGRAM(s) IV Push once PRN Severe Pain (7 - 10)  polyethylene glycol 3350 17 Gram(s) Oral daily PRN Constipation      LABS:                        8.2    8.48  )-----------( 470      ( 13 Apr 2023 07:17 )             26.3     04-13    135  |  104  |  51<H>  ----------------------------<  144<H>  4.9   |  22  |  1.8<H>    Ca    9.4      13 Apr 2023 07:17  Mg     2.1     04-13

## 2023-04-13 NOTE — PROGRESS NOTE ADULT - SUBJECTIVE AND OBJECTIVE BOX
Gastroenterology progress note:     Patient is a 89y old  Female who presents with a chief complaint of SOB (13 Apr 2023 08:52)       Admitted on: 04-03-23    We are following the patient for:     Interval History:  Patient reported to have 2 large BM overnight without need for enemas. Patient reports she is feeling more relief and better today, epigastric pain still present but decreased overall, now 4/10.       PAST MEDICAL & SURGICAL HISTORY:  Hypertension      Diabetes mellitus      Chronic kidney disease, unspecified CKD stage          MEDICATIONS  (STANDING):  albuterol/ipratropium for Nebulization 3 milliLiter(s) Nebulizer every 12 hours  amLODIPine   Tablet 2.5 milliGRAM(s) Oral daily  atorvastatin 20 milliGRAM(s) Oral at bedtime  budesonide  80 MICROgram(s)/formoterol 4.5 MICROgram(s) Inhaler 2 Puff(s) Inhalation two times a day  chlorhexidine 2% Cloths 1 Application(s) Topical <User Schedule>  dextrose 5%. 1000 milliLiter(s) (50 mL/Hr) IV Continuous <Continuous>  dextrose 5%. 1000 milliLiter(s) (100 mL/Hr) IV Continuous <Continuous>  dextrose 50% Injectable 25 Gram(s) IV Push once  dextrose 50% Injectable 12.5 Gram(s) IV Push once  dextrose 50% Injectable 25 Gram(s) IV Push once  glucagon  Injectable 1 milliGRAM(s) IntraMuscular once  heparin   Injectable 5000 Unit(s) SubCutaneous every 8 hours  insulin glargine Injectable (LANTUS) 10 Unit(s) SubCutaneous every morning  insulin lispro (ADMELOG) corrective regimen sliding scale   SubCutaneous three times a day before meals  insulin lispro (ADMELOG) corrective regimen sliding scale   SubCutaneous at bedtime  insulin lispro Injectable (ADMELOG) 3 Unit(s) SubCutaneous three times a day before meals  melatonin 5 milliGRAM(s) Oral at bedtime  pantoprazole    Tablet 40 milliGRAM(s) Oral two times a day  senna 2 Tablet(s) Oral at bedtime  sucralfate 1 Gram(s) Oral two times a day    MEDICATIONS  (PRN):  acetaminophen     Tablet .. 650 milliGRAM(s) Oral every 6 hours PRN Moderate Pain (4 - 6)  albuterol/ipratropium for Nebulization 3 milliLiter(s) Nebulizer every 4 hours PRN Shortness of Breath and/or Wheezing  aluminum hydroxide/magnesium hydroxide/simethicone Suspension 30 milliLiter(s) Oral every 4 hours PRN Dyspepsia  dextrose Oral Gel 15 Gram(s) Oral once PRN Blood Glucose LESS THAN 70 milliGRAM(s)/deciliter  HYDROmorphone  Injectable 0.25 milliGRAM(s) IV Push once PRN Severe Pain (7 - 10)  lactulose Syrup 10 Gram(s) Oral every 12 hours PRN constipation  polyethylene glycol 3350 17 Gram(s) Oral two times a day PRN Constipation      Allergies  penicillin (Unknown)      Review of Systems:   Cardiovascular:  No Chest Pain, No Palpitations  Respiratory:  No Cough, No Dyspnea  Gastrointestinal:  As described in HPI    Physical Examination:  T(C): 36.3 (04-13-23 @ 12:38), Max: 36.6 (04-13-23 @ 05:00)  HR: 104 (04-13-23 @ 12:38) (87 - 118)  BP: 162/76 (04-13-23 @ 12:38) (138/85 - 162/76)  RR: 18 (04-13-23 @ 12:38) (18 - 18)  SpO2: 99% (04-13-23 @ 12:38) (97% - 99%)      Constitutional: No acute distress.  Respiratory:  No signs of respiratory distress. Lung sounds are clear bilaterally.  Cardiovascular:  S1 S2, Regular rate and rhythm.  Abdominal: Abdomen is soft, symmetric, and non-tender without distention. Bowel sounds are present and normoactive in all four quadrants.   Skin: No rashes, No Jaundice.        Data: (reviewed by attending)                        8.2    8.48  )-----------( 470      ( 13 Apr 2023 07:17 )             26.3     Hgb trend:  8.2  04-13-23 @ 07:17  9.5  04-12-23 @ 08:20  9.2  04-11-23 @ 07:09        04-13    135  |  104  |  51<H>  ----------------------------<  144<H>  4.9   |  22  |  1.8<H>    Ca    9.4      13 Apr 2023 07:17  Mg     2.1     04-13      Liver panel trend:  TBili <0.2   /   AST 18   /   ALT 11   /   AlkP 122   /   Tptn 5.9   /   Alb 2.6    /   DBili --      04-08  TBili 0.3   /   AST 11   /   ALT 8   /   AlkP 114   /   Tptn 6.0   /   Alb 2.7    /   DBili --      04-07  TBili <0.2   /   AST 11   /   ALT 6   /   AlkP 121   /   Tptn 5.9   /   Alb 2.5    /   DBili --      04-06  TBili <0.2   /   AST 16   /   ALT 10   /   AlkP 114   /   Tptn 6.5   /   Alb 2.7    /   DBili --      04-05  TBili <0.2   /   AST 11   /   ALT 8   /   AlkP 105   /   Tptn 6.2   /   Alb 2.6    /   DBili --      04-04             Radiology: (reviewed by attending)    < from: [facility] Doppler Mesenteric Limited (04.13.23 @ 10:16) >  IMPRESSION:  Nonconclusive examination of aorta and mesenteric vessels due to presence   of bowel gas    < end of copied text >

## 2023-04-14 DIAGNOSIS — Z51.5 ENCOUNTER FOR PALLIATIVE CARE: ICD-10-CM

## 2023-04-14 DIAGNOSIS — R10.9 UNSPECIFIED ABDOMINAL PAIN: ICD-10-CM

## 2023-04-14 DIAGNOSIS — I82.409 ACUTE EMBOLISM AND THROMBOSIS OF UNSPECIFIED DEEP VEINS OF UNSPECIFIED LOWER EXTREMITY: ICD-10-CM

## 2023-04-14 DIAGNOSIS — Z71.89 OTHER SPECIFIED COUNSELING: ICD-10-CM

## 2023-04-14 DIAGNOSIS — Z87.09 PERSONAL HISTORY OF OTHER DISEASES OF THE RESPIRATORY SYSTEM: ICD-10-CM

## 2023-04-14 DIAGNOSIS — J69.0 PNEUMONITIS DUE TO INHALATION OF FOOD AND VOMIT: ICD-10-CM

## 2023-04-14 LAB
ALBUMIN SERPL ELPH-MCNC: 3.1 G/DL — LOW (ref 3.5–5.2)
ALBUMIN SERPL ELPH-MCNC: 3.2 G/DL — LOW (ref 3.5–5.2)
ALP SERPL-CCNC: 129 U/L — HIGH (ref 30–115)
ALP SERPL-CCNC: 149 U/L — HIGH (ref 30–115)
ALT FLD-CCNC: 15 U/L — SIGNIFICANT CHANGE UP (ref 0–41)
ALT FLD-CCNC: 17 U/L — SIGNIFICANT CHANGE UP (ref 0–41)
ANION GAP SERPL CALC-SCNC: 11 MMOL/L — SIGNIFICANT CHANGE UP (ref 7–14)
ANION GAP SERPL CALC-SCNC: 11 MMOL/L — SIGNIFICANT CHANGE UP (ref 7–14)
ANION GAP SERPL CALC-SCNC: 9 MMOL/L — SIGNIFICANT CHANGE UP (ref 7–14)
AST SERPL-CCNC: 14 U/L — SIGNIFICANT CHANGE UP (ref 0–41)
AST SERPL-CCNC: 17 U/L — SIGNIFICANT CHANGE UP (ref 0–41)
BILIRUB SERPL-MCNC: 0.3 MG/DL — SIGNIFICANT CHANGE UP (ref 0.2–1.2)
BILIRUB SERPL-MCNC: <0.2 MG/DL — SIGNIFICANT CHANGE UP (ref 0.2–1.2)
BLD GP AB SCN SERPL QL: SIGNIFICANT CHANGE UP
BUN SERPL-MCNC: 43 MG/DL — HIGH (ref 10–20)
BUN SERPL-MCNC: 44 MG/DL — HIGH (ref 10–20)
BUN SERPL-MCNC: 47 MG/DL — HIGH (ref 10–20)
CALCIUM SERPL-MCNC: 9.2 MG/DL — SIGNIFICANT CHANGE UP (ref 8.4–10.5)
CALCIUM SERPL-MCNC: 9.3 MG/DL — SIGNIFICANT CHANGE UP (ref 8.4–10.5)
CALCIUM SERPL-MCNC: 9.9 MG/DL — SIGNIFICANT CHANGE UP (ref 8.4–10.4)
CHLORIDE SERPL-SCNC: 102 MMOL/L — SIGNIFICANT CHANGE UP (ref 98–110)
CHLORIDE SERPL-SCNC: 97 MMOL/L — LOW (ref 98–110)
CHLORIDE SERPL-SCNC: 98 MMOL/L — SIGNIFICANT CHANGE UP (ref 98–110)
CK MB CFR SERPL CALC: 1.6 NG/ML — SIGNIFICANT CHANGE UP (ref 0.6–6.3)
CO2 SERPL-SCNC: 22 MMOL/L — SIGNIFICANT CHANGE UP (ref 17–32)
CO2 SERPL-SCNC: 24 MMOL/L — SIGNIFICANT CHANGE UP (ref 17–32)
CO2 SERPL-SCNC: 24 MMOL/L — SIGNIFICANT CHANGE UP (ref 17–32)
CREAT SERPL-MCNC: 1.4 MG/DL — SIGNIFICANT CHANGE UP (ref 0.7–1.5)
CREAT SERPL-MCNC: 1.4 MG/DL — SIGNIFICANT CHANGE UP (ref 0.7–1.5)
CREAT SERPL-MCNC: 1.5 MG/DL — SIGNIFICANT CHANGE UP (ref 0.7–1.5)
EGFR: 33 ML/MIN/1.73M2 — LOW
EGFR: 36 ML/MIN/1.73M2 — LOW
EGFR: 36 ML/MIN/1.73M2 — LOW
GLUCOSE BLDC GLUCOMTR-MCNC: 127 MG/DL — HIGH (ref 70–99)
GLUCOSE BLDC GLUCOMTR-MCNC: 155 MG/DL — HIGH (ref 70–99)
GLUCOSE BLDC GLUCOMTR-MCNC: 165 MG/DL — HIGH (ref 70–99)
GLUCOSE BLDC GLUCOMTR-MCNC: 191 MG/DL — HIGH (ref 70–99)
GLUCOSE BLDC GLUCOMTR-MCNC: 236 MG/DL — HIGH (ref 70–99)
GLUCOSE BLDC GLUCOMTR-MCNC: 64 MG/DL — LOW (ref 70–99)
GLUCOSE SERPL-MCNC: 118 MG/DL — HIGH (ref 70–99)
GLUCOSE SERPL-MCNC: 127 MG/DL — HIGH (ref 70–99)
GLUCOSE SERPL-MCNC: 160 MG/DL — HIGH (ref 70–99)
HCT VFR BLD CALC: 24.1 % — LOW (ref 37–47)
HCT VFR BLD CALC: 30.9 % — LOW (ref 37–47)
HGB BLD-MCNC: 7.6 G/DL — LOW (ref 12–16)
HGB BLD-MCNC: 9.8 G/DL — LOW (ref 12–16)
MAGNESIUM SERPL-MCNC: 1.7 MG/DL — LOW (ref 1.8–2.4)
MAGNESIUM SERPL-MCNC: 2.2 MG/DL — SIGNIFICANT CHANGE UP (ref 1.8–2.4)
MCHC RBC-ENTMCNC: 28 PG — SIGNIFICANT CHANGE UP (ref 27–31)
MCHC RBC-ENTMCNC: 28 PG — SIGNIFICANT CHANGE UP (ref 27–31)
MCHC RBC-ENTMCNC: 31.5 G/DL — LOW (ref 32–37)
MCHC RBC-ENTMCNC: 31.7 G/DL — LOW (ref 32–37)
MCV RBC AUTO: 88.3 FL — SIGNIFICANT CHANGE UP (ref 81–99)
MCV RBC AUTO: 88.9 FL — SIGNIFICANT CHANGE UP (ref 81–99)
NRBC # BLD: 0 /100 WBCS — SIGNIFICANT CHANGE UP (ref 0–0)
NRBC # BLD: 0 /100 WBCS — SIGNIFICANT CHANGE UP (ref 0–0)
PLATELET # BLD AUTO: 240 K/UL — SIGNIFICANT CHANGE UP (ref 130–400)
PLATELET # BLD AUTO: 498 K/UL — HIGH (ref 130–400)
PMV BLD: 11 FL — HIGH (ref 7.4–10.4)
PMV BLD: 9.7 FL — SIGNIFICANT CHANGE UP (ref 7.4–10.4)
POTASSIUM SERPL-MCNC: 5.2 MMOL/L — HIGH (ref 3.5–5)
POTASSIUM SERPL-MCNC: 5.3 MMOL/L — HIGH (ref 3.5–5)
POTASSIUM SERPL-MCNC: 5.6 MMOL/L — HIGH (ref 3.5–5)
POTASSIUM SERPL-SCNC: 5.2 MMOL/L — HIGH (ref 3.5–5)
POTASSIUM SERPL-SCNC: 5.3 MMOL/L — HIGH (ref 3.5–5)
POTASSIUM SERPL-SCNC: 5.6 MMOL/L — HIGH (ref 3.5–5)
PROT SERPL-MCNC: 6.7 G/DL — SIGNIFICANT CHANGE UP (ref 6–8)
PROT SERPL-MCNC: 6.7 G/DL — SIGNIFICANT CHANGE UP (ref 6–8)
RBC # BLD: 2.71 M/UL — LOW (ref 4.2–5.4)
RBC # BLD: 3.5 M/UL — LOW (ref 4.2–5.4)
RBC # FLD: 16.6 % — HIGH (ref 11.5–14.5)
RBC # FLD: 16.6 % — HIGH (ref 11.5–14.5)
SODIUM SERPL-SCNC: 132 MMOL/L — LOW (ref 135–146)
SODIUM SERPL-SCNC: 133 MMOL/L — LOW (ref 135–146)
SODIUM SERPL-SCNC: 133 MMOL/L — LOW (ref 135–146)
TROPONIN T SERPL-MCNC: 0.02 NG/ML — HIGH
WBC # BLD: 11.26 K/UL — HIGH (ref 4.8–10.8)
WBC # BLD: 11.8 K/UL — HIGH (ref 4.8–10.8)
WBC # FLD AUTO: 11.26 K/UL — HIGH (ref 4.8–10.8)
WBC # FLD AUTO: 11.8 K/UL — HIGH (ref 4.8–10.8)

## 2023-04-14 PROCEDURE — 99233 SBSQ HOSP IP/OBS HIGH 50: CPT

## 2023-04-14 PROCEDURE — 93978 VASCULAR STUDY: CPT | Mod: 26

## 2023-04-14 PROCEDURE — 93970 EXTREMITY STUDY: CPT | Mod: 26

## 2023-04-14 PROCEDURE — 99223 1ST HOSP IP/OBS HIGH 75: CPT

## 2023-04-14 PROCEDURE — 71045 X-RAY EXAM CHEST 1 VIEW: CPT | Mod: 26

## 2023-04-14 PROCEDURE — 93010 ELECTROCARDIOGRAM REPORT: CPT

## 2023-04-14 RX ORDER — TRAMADOL HYDROCHLORIDE 50 MG/1
25 TABLET ORAL EVERY 8 HOURS
Refills: 0 | Status: DISCONTINUED | OUTPATIENT
Start: 2023-04-14 | End: 2023-04-19

## 2023-04-14 RX ORDER — PANTOPRAZOLE SODIUM 20 MG/1
40 TABLET, DELAYED RELEASE ORAL
Refills: 0 | Status: DISCONTINUED | OUTPATIENT
Start: 2023-04-14 | End: 2023-04-14

## 2023-04-14 RX ORDER — OXYCODONE HYDROCHLORIDE 5 MG/1
10 TABLET ORAL EVERY 6 HOURS
Refills: 0 | Status: DISCONTINUED | OUTPATIENT
Start: 2023-04-14 | End: 2023-04-14

## 2023-04-14 RX ORDER — SODIUM ZIRCONIUM CYCLOSILICATE 10 G/10G
10 POWDER, FOR SUSPENSION ORAL ONCE
Refills: 0 | Status: COMPLETED | OUTPATIENT
Start: 2023-04-14 | End: 2023-04-14

## 2023-04-14 RX ORDER — PANTOPRAZOLE SODIUM 20 MG/1
40 TABLET, DELAYED RELEASE ORAL
Refills: 0 | Status: DISCONTINUED | OUTPATIENT
Start: 2023-04-14 | End: 2023-04-19

## 2023-04-14 RX ORDER — MAGNESIUM SULFATE 500 MG/ML
2 VIAL (ML) INJECTION ONCE
Refills: 0 | Status: COMPLETED | OUTPATIENT
Start: 2023-04-14 | End: 2023-04-14

## 2023-04-14 RX ORDER — FOLIC ACID 0.8 MG
1 TABLET ORAL DAILY
Refills: 0 | Status: DISCONTINUED | OUTPATIENT
Start: 2023-04-14 | End: 2023-04-19

## 2023-04-14 RX ORDER — SODIUM ZIRCONIUM CYCLOSILICATE 10 G/10G
10 POWDER, FOR SUSPENSION ORAL ONCE
Refills: 0 | Status: DISCONTINUED | OUTPATIENT
Start: 2023-04-14 | End: 2023-04-19

## 2023-04-14 RX ADMIN — SENNA PLUS 2 TABLET(S): 8.6 TABLET ORAL at 18:47

## 2023-04-14 RX ADMIN — Medication 1 GRAM(S): at 18:47

## 2023-04-14 RX ADMIN — Medication 650 MILLIGRAM(S): at 12:27

## 2023-04-14 RX ADMIN — HEPARIN SODIUM 5000 UNIT(S): 5000 INJECTION INTRAVENOUS; SUBCUTANEOUS at 18:47

## 2023-04-14 RX ADMIN — Medication 1 GRAM(S): at 05:43

## 2023-04-14 RX ADMIN — Medication 650 MILLIGRAM(S): at 23:21

## 2023-04-14 RX ADMIN — POLYETHYLENE GLYCOL 3350 17 GRAM(S): 17 POWDER, FOR SOLUTION ORAL at 22:43

## 2023-04-14 RX ADMIN — Medication 1 MILLIGRAM(S): at 12:28

## 2023-04-14 RX ADMIN — AMLODIPINE BESYLATE 2.5 MILLIGRAM(S): 2.5 TABLET ORAL at 05:42

## 2023-04-14 RX ADMIN — SODIUM ZIRCONIUM CYCLOSILICATE 10 GRAM(S): 10 POWDER, FOR SUSPENSION ORAL at 12:28

## 2023-04-14 RX ADMIN — HEPARIN SODIUM 5000 UNIT(S): 5000 INJECTION INTRAVENOUS; SUBCUTANEOUS at 05:42

## 2023-04-14 RX ADMIN — INSULIN GLARGINE 10 UNIT(S): 100 INJECTION, SOLUTION SUBCUTANEOUS at 12:28

## 2023-04-14 RX ADMIN — TRAMADOL HYDROCHLORIDE 25 MILLIGRAM(S): 50 TABLET ORAL at 14:30

## 2023-04-14 RX ADMIN — ATORVASTATIN CALCIUM 20 MILLIGRAM(S): 80 TABLET, FILM COATED ORAL at 22:43

## 2023-04-14 RX ADMIN — SENNA PLUS 2 TABLET(S): 8.6 TABLET ORAL at 05:42

## 2023-04-14 RX ADMIN — Medication 3 UNIT(S): at 12:30

## 2023-04-14 RX ADMIN — Medication 3 MILLILITER(S): at 07:49

## 2023-04-14 RX ADMIN — POLYETHYLENE GLYCOL 3350 17 GRAM(S): 17 POWDER, FOR SOLUTION ORAL at 05:43

## 2023-04-14 RX ADMIN — Medication 650 MILLIGRAM(S): at 22:51

## 2023-04-14 RX ADMIN — HEPARIN SODIUM 5000 UNIT(S): 5000 INJECTION INTRAVENOUS; SUBCUTANEOUS at 22:43

## 2023-04-14 RX ADMIN — Medication 3 UNIT(S): at 08:14

## 2023-04-14 RX ADMIN — PANTOPRAZOLE SODIUM 40 MILLIGRAM(S): 20 TABLET, DELAYED RELEASE ORAL at 18:47

## 2023-04-14 RX ADMIN — POLYETHYLENE GLYCOL 3350 17 GRAM(S): 17 POWDER, FOR SOLUTION ORAL at 18:48

## 2023-04-14 RX ADMIN — Medication 1: at 18:43

## 2023-04-14 RX ADMIN — PANTOPRAZOLE SODIUM 40 MILLIGRAM(S): 20 TABLET, DELAYED RELEASE ORAL at 05:43

## 2023-04-14 RX ADMIN — Medication 5 MILLIGRAM(S): at 22:43

## 2023-04-14 RX ADMIN — Medication 1: at 12:30

## 2023-04-14 RX ADMIN — Medication 25 GRAM(S): at 12:27

## 2023-04-14 RX ADMIN — CHLORHEXIDINE GLUCONATE 1 APPLICATION(S): 213 SOLUTION TOPICAL at 05:41

## 2023-04-14 RX ADMIN — BUDESONIDE AND FORMOTEROL FUMARATE DIHYDRATE 2 PUFF(S): 160; 4.5 AEROSOL RESPIRATORY (INHALATION) at 20:45

## 2023-04-14 RX ADMIN — Medication 650 MILLIGRAM(S): at 05:46

## 2023-04-14 RX ADMIN — Medication 3 UNIT(S): at 18:43

## 2023-04-14 NOTE — PROGRESS NOTE ADULT - ASSESSMENT
89 year old female with PMHx of HTN, DM, CKD, active smoker, COPD not on home O2 recent admission for aspiration PNA with incidental finding of distal DVT presented to the ED for hypoxia.   Admitted for hypoxic respiratory failure 2/2 aspiration pneumonia. GI consulted for epigastric pain.    #Epigastric Burning/Discomfort, ddx includes PUD (active smoker) vs chronic mesenteric ischemia vs less likely biliary colic due to pain pattern vs less likely chronic pancreatitis)  pt is a poor historian and also reports CP at times  #Chronic Anemia  #chronic constipation  - epigastric pain radiating to the back, associated with PO intake for the past 3 months along with nausea and vomiting  - severe constipation   - reports NSAID use occasionally  - Hgb 9.2, MCV 90  - iron studies in March 2023, slight low iron   - Lactate 1.3 (wnl), WBC 8K  - Lipase 18, GI and H pylori stool test negative in March  - CT A/P non cont 3/21/23: cholelithiasis, colonic diverticulosis   - KUB 4/11: copious stool in colon, non obstructive gas pattern  - Mesenteric doppler 4/13: nonconclusive exam due to bowel gas    Plan:  - consider cardiology eval  - continue PPI BID and carafate 1g BID  - DVT treatment as per primary team  - bowel regimen w/ senna 12h and miralax q8h, tap water enema q8h PRN  - repeat mesenteric doppler to eval for mesenteric ischemia once patient has become less distended (cannot obtain CTA as pt has CKD)  - soft diet as tolerated  - avoid constipation  - serial abdominal exams  - Patient is at high risk for any endoscopic intervention given underlying comorbidities  - continue conservative management as per primary team     89 year old female with PMHx of HTN, DM, CKD, active smoker, COPD not on home O2 recent admission for aspiration PNA with incidental finding of distal DVT presented to the ED for hypoxia.   Admitted for hypoxic respiratory failure 2/2 aspiration pneumonia. GI consulted for epigastric pain.    #Epigastric Burning/Discomfort, ddx includes PUD (active smoker) vs chronic mesenteric ischemia vs less likely biliary colic due to pain pattern vs less likely chronic pancreatitis)  #Chronic Anemia  #chronic constipation  - epigastric pain radiating to the back, associated with PO intake for the past 3 months along with nausea and vomiting  - pt is a poor historian and also reports CP at times  - severe constipation   - reports NSAID use occasionally  - Hgb 7.6 today, downtrending  - iron studies in March 2023, slight low iron   - Lactate 1.3 (wnl)  - Lipase 18, GI and H pylori stool test negative in March  - CT A/P non cont 3/21/23: cholelithiasis, colonic diverticulosis   - Mesenteric doppler 4/13: nonconclusive exam due to bowel gas  - KUB 4/13 showed stool and gas filled colon, s/p tap water enema  - repeat KUB showed improvement    Plan:  - continue PPI BID and carafate 1g BID  - DVT treatment as per primary team  - bowel regimen w/ senna 12h and miralax q8h, tap water enema q8h PRN  - repeat mesenteric doppler to eval for mesenteric ischemia once patient has become less distended (cannot obtain CTA as pt has CKD)  - soft diet as tolerated  - avoid constipation  - serial abdominal exams  - Patient is at high risk for any endoscopic intervention given underlying comorbidities  - continue conservative management as per primary team     89 year old female with PMHx of HTN, DM, CKD, active smoker, COPD not on home O2 recent admission for aspiration PNA with incidental finding of distal DVT presented to the ED for hypoxia.   Admitted for hypoxic respiratory failure 2/2 aspiration pneumonia. GI consulted for epigastric pain.    #Epigastric Burning/Discomfort  #Chronic Anemia  #chronic constipation  - epigastric pain radiating to the back, associated with PO intake for the past 3 months along with nausea and vomiting  - pt is a poor historian and also reports CP at times  - severe constipation   - reports NSAID use occasionally  - Hgb 7.6, downtrending  - iron studies in March 2023, slight low iron   - Lactate 1.3 (wnl)  - Lipase 18, GI and H pylori stool test negative in March  - CT A/P non cont 3/21/23: cholelithiasis, colonic diverticulosis   - Mesenteric doppler 4/13: nonconclusive exam due to bowel gas  - KUB 4/13 showed stool and gas filled colon, s/p tap water enema  - repeat KUB showed improvement  - DDx includes PUD (active smoker) vs chronic mesenteric ischemia vs less likely biliary colic due to pain pattern vs less likely chronic pancreatitis)    Plan:  - continue PPI BID and carafate 1g BID  - DVT treatment as per primary team  - bowel regimen w/ senna 12h and miralax q8h, tap water enema q8h PRN  - repeat mesenteric doppler to eval for mesenteric ischemia once patient has become less distended (cannot obtain CTA as pt has CKD)  - soft diet as tolerated  - avoid constipation  - serial abdominal exams  - Patient is at high risk for any endoscopic intervention given underlying comorbidities  - continue conservative management as per primary team

## 2023-04-14 NOTE — PROGRESS NOTE ADULT - ASSESSMENT
89 year old female with PMHx of HTN, DM, CKD, active smoker, COPD not on home O2 recent admission for aspiration PNA with incidental finding of distal DVT presented to the ED for hypoxia    #Acute Hypoxic respiratory failure likely 2/2 asp PNA/Bilateral with sepsis POA  - CT angio: no PE but B/L lower lobe and R middle lobe consolidations   - COPD not on home 02  - Active smoker   - Recent Distal DVT  - intermittently on 2 LNC  - RVP, strep, legionella, bcx and MRSA negative   - procal 0.14  - s/p 7d course of cefepime and levaquin, completed 4/9  - c/w with prednisone taper, prednisone 10mg to complete on 4/12  - echo ef 55%, GIDD, mod MS, mild AS   - diet per speech and swallow   - PT: walked 3 steps desat to 77% on 1L NC, returned to 96% on 4L NC  - pt not requiring o2 no wearing for comfort   - Repeat CXR today     #Chest pain likely 2/2 costocondritis  - trop neg x1 on 4/11, repeat x2 today with CKMB  - sinus tach on ekg- likely 2/2 pain  - CT chest 4/3 neg for PE  - Neg duplex 4/3, will repeat duplex  - echo noted  as above  - unlike cardiac, if pt makes trops will consult cardiology   - started on tramadol 25 mg PO tid prn     #Dyspepsia  #Peptic Ulcer Disease  #Hiatal hernia  #Intermittent epigastric/LUQ/back pain  - TTP - improving  - KUB: copious stool seen throughout the colon. nonobstructive bowel gas pattern. suggestion of contrast in the stomach  - troponin 0.01  - lactate 1.3 -> 1.2  - GI consult: high risk for endoscopy, c/w conservative management  - bowel regimen miralax, senna, tap water enema q8 prn, lactulose prn   - changed PPI to IV BID, c/w carafate 1g BID  - mesenteric doppler: Nonconclusive examination of aorta and mesenteric vessels due to presence of bowel gas  -repeat mesenteric doppler as per GI   - obtain KUB- improved having BMs  - team DW GI       #LLE distal DVT   - Duplex (3/24/23) noted thrombus in left gastrocnemius and peroneal veins  - repeat duplex unchanged, was seen by vascular on recent admission  - was started on Eliquis given pt is not ambulating and remains on bed but pt has hx of PUD, and c/o of intermittent epigastric burn/ discomfort, would be at risk of GIB, daughter Meena Powell (at bedside) decided to hold on anticoagulant  - repeat  duplex     #hyperkalemia- 5.6- lokelma 10 mgx1, repeat CMP    # hyponatremia - stable  - HCTZ on hold    #CKD III  - cr at baseline  - keep off lisinopril   - was following with Nephrology in [place]  - encourage PO intake    #HTN  - was on amlodipine 2.5 mg daily, HCTZ 12.5mg daily and lisinopril 10mg daily in assisted living   - holding HCTZ and lisinopril     #DM  - takes pioglitazone 15mg daily  - c/w insulin lantus 10U at bedtime, lispro 3U TID; monitor FS  - A1c 7.4% (3/2023)    DVT pp: heparin  GI ppx: protonix  Diet: Soft and bite sized/DASH/CC  Code: DNR/DNI    #Progress Note Handoff  Pending (specify):  follow up trops, duplex, CXR   Family discussion: house staff updated pt family  Disposition: [facility], [facility]  Decision to admit the pt is based on acuity as above   High risk given above acuity and comorbidities. labs reviewed. team DW Gi team, pt anticpated for weekend

## 2023-04-14 NOTE — CONSULT NOTE ADULT - PROBLEM SELECTOR RECOMMENDATION 5
- d/w daughter regarding GOC  - patient is DNR  - discussed hospice, explained elements of hospice briefly, daughter would like to hear more; referral to be made, case management referral sent  - discussed elements of CMO, she would like to discuss more next week

## 2023-04-14 NOTE — CONSULT NOTE ADULT - ASSESSMENT
89F with PMH of HTN, DM, CKD, active smoker, COPD (no home O2), recent admission for aspiration PNA, here with hypoxia due to aspiration PNA and sepsis, s/p 7-day course of cefepime and levaquin and prednisone taper. Desaturated when walking with PT. Course c/b abdominal pain, GI evaluated, felt to be high risk for endoscopy, with continued pain of unclear etiology, now on tramadol (has tried IV dilaudid, IV morphine, tylenol). Also with LLE DVT, elqiuis held after discussion with daughter Paige Powell. Patient is DNR/DNI. Palliative care consulted for GOC.

## 2023-04-14 NOTE — PROGRESS NOTE ADULT - SUBJECTIVE AND OBJECTIVE BOX
Patient is a 89y old  Female who presents with a chief complaint of SOB (04-13-23)      Pt seen and examined at bedside. No  SOB. Pt complained of lower chest/abd pain. Pt stats that is goes around her chest and to her back.       ABG - ( 12 Apr 2023 11:00 )  pH: 7.42  /  pCO2: 38    /  pO2: 91    / HCO3: 25    / Base Excess: 0.3   /  SaO2: 97.4          PAST MEDICAL & SURGICAL HISTORY:  Hypertension    Diabetes mellitus    Chronic kidney disease, unspecified CKD stage        VITAL SIGNS (Last 24 hrs):  T(C): 37 (04-14-23 @ 05:00), Max: 37 (04-14-23 @ 05:00)  HR: 109 (04-14-23 @ 05:00) (104 - 110)  BP: 149/74 (04-14-23 @ 05:00) (148/72 - 162/76)  RR: 20 (04-14-23 @ 05:00) (18 - 20)  SpO2: 97% (04-14-23 @ 05:00) (96% - 99%)  Wt(kg): --  Daily     Daily     I&O's Summary    13 Apr 2023 07:01  -  14 Apr 2023 07:00  --------------------------------------------------------  IN: 0 mL / OUT: 250 mL / NET: -250 mL        PHYSICAL EXAM:  GENERAL: mild distress, frail elderly   HEAD:  Atraumatic, Normocephalic  EYES: EOMI, PERRLA, conjunctiva and sclera clear  NECK: Supple, No JVD  CHEST/LUNG: coarse rales/ronhci, Chest tenderness reproducible on exam, has tenderness in chest when moves upper ext  HEART: Regular rate and rhythm; No murmurs, rubs, or gallops  ABDOMEN: Soft, Nontender, Nondistended; Bowel sounds present  EXTREMITIES:  2+ Peripheral Pulses, No clubbing, cyanosis, or edema  PSYCH: AAOx3  NEUROLOGY: non-focal  SKIN: No rashes or lesions    Labs Reviewed  Spoke to patient in regards to abnormal labs.    CBC Full  -  ( 14 Apr 2023 08:00 )  WBC Count : 11.26 K/uL  Hemoglobin : 7.6 g/dL  Hematocrit : 24.1 %  Platelet Count - Automated : 240 K/uL  Mean Cell Volume : 88.9 fL  Mean Cell Hemoglobin : 28.0 pg  Mean Cell Hemoglobin Concentration : 31.5 g/dL  Auto Neutrophil # : x  Auto Lymphocyte # : x  Auto Monocyte # : x  Auto Eosinophil # : x  Auto Basophil # : x  Auto Neutrophil % : x  Auto Lymphocyte % : x  Auto Monocyte % : x  Auto Eosinophil % : x  Auto Basophil % : x    BMP:    04-14 @ 08:00    Blood Urea Nitrogen - 47  Calcium - 9.2  Carbond Dioxide - 22  Chloride - 102  Creatinine - 1.5  Glucose - 118  Potassium - 5.6  Sodium - 133      Hemoglobin A1c -     Urine Culture:        COVID Labs  CRP:    Procalcitonin, Serum: 0.14 ng/mL (04-03-23 @ 11:42)    D-Dimer:    Ferritin, Serum: 520 ng/mL (03-22-23 @ 09:05)          Imaging reviewed independently and reviewed read    < from: Xray Kidney Ureter Bladder (04.13.23 @ 20:40) >  IMPRESSION: Decreased stool burden    < end of copied text >      MEDICATIONS  (STANDING):  albuterol/ipratropium for Nebulization 3 milliLiter(s) Nebulizer every 12 hours  amLODIPine   Tablet 2.5 milliGRAM(s) Oral daily  atorvastatin 20 milliGRAM(s) Oral at bedtime  budesonide  80 MICROgram(s)/formoterol 4.5 MICROgram(s) Inhaler 2 Puff(s) Inhalation two times a day  chlorhexidine 2% Cloths 1 Application(s) Topical <User Schedule>  dextrose 5%. 1000 milliLiter(s) (50 mL/Hr) IV Continuous <Continuous>  dextrose 5%. 1000 milliLiter(s) (100 mL/Hr) IV Continuous <Continuous>  dextrose 50% Injectable 25 Gram(s) IV Push once  dextrose 50% Injectable 12.5 Gram(s) IV Push once  dextrose 50% Injectable 25 Gram(s) IV Push once  folic acid 1 milliGRAM(s) Oral daily  glucagon  Injectable 1 milliGRAM(s) IntraMuscular once  heparin   Injectable 5000 Unit(s) SubCutaneous every 8 hours  insulin glargine Injectable (LANTUS) 10 Unit(s) SubCutaneous every morning  insulin lispro (ADMELOG) corrective regimen sliding scale   SubCutaneous three times a day before meals  insulin lispro (ADMELOG) corrective regimen sliding scale   SubCutaneous at bedtime  insulin lispro Injectable (ADMELOG) 3 Unit(s) SubCutaneous three times a day before meals  magnesium sulfate  IVPB 2 Gram(s) IV Intermittent once  melatonin 5 milliGRAM(s) Oral at bedtime  pantoprazole    Tablet 40 milliGRAM(s) Oral two times a day  polyethylene glycol 3350 17 Gram(s) Oral <User Schedule>  senna 2 Tablet(s) Oral every 12 hours  sodium zirconium cyclosilicate 10 Gram(s) Oral once  sucralfate 1 Gram(s) Oral two times a day    MEDICATIONS  (PRN):  acetaminophen     Tablet .. 650 milliGRAM(s) Oral every 6 hours PRN Moderate Pain (4 - 6)  albuterol/ipratropium for Nebulization 3 milliLiter(s) Nebulizer every 4 hours PRN Shortness of Breath and/or Wheezing  aluminum hydroxide/magnesium hydroxide/simethicone Suspension 30 milliLiter(s) Oral every 4 hours PRN Dyspepsia  dextrose Oral Gel 15 Gram(s) Oral once PRN Blood Glucose LESS THAN 70 milliGRAM(s)/deciliter  lactulose Syrup 10 Gram(s) Oral every 12 hours PRN constipation    < from: 12 Lead ECG (04.11.23 @ 09:41) >  Diagnosis Line Sinus tachycardia  Otherwise normal ECG    < end of copied text >      < from: TTE Echo Complete w/o Contrast w/ Doppler (04.07.23 @ 10:17) >  Summary:   1. Technically difficult study.   2. Normal left ventricular internal cavity size. Normal global left   ventricular systolic function. LV Ejection Fraction by Araujo's Method   with a biplane EF of 55 %. Grade I diastolic dysfunction.   3. Normal right ventricular size and function.   4.Moderate mitral stenosis. Mild mitral valve regurgitation.   5. Mild aortic valve stenosis.    < end of copied text >

## 2023-04-14 NOTE — CONSULT NOTE ADULT - SUBJECTIVE AND OBJECTIVE BOX
HPI: 89F with PMH of HTN, DM, CKD, active smoker, COPD (no home O2), recent admission for aspiration PNA, here with hypoxia due to aspiration PNA and sepsis, s/p 7-day course of cefepime and levaquin and prednisone taper. Desaturated when walking with PT. Course c/b abdominal pain, GI evaluated, felt to be high risk for endoscopy, with continued pain of unclear etiology, now on tramadol (has tried IV dilaudid, IV morphine, tylenol). Also with LLE DVT, elqiuis held after discussion with daughter Paige Powell. Patient is DNR/DNI. Palliative care consulted for GOC.    PERTINENT PM/SXH:   Hypertension    Diabetes mellitus    Chronic kidney disease, unspecified CKD stage    FAMILY HISTORY: no family hx of GI malignancies/diseases    ITEMS NOT CHECKED ARE NOT PRESENT    SOCIAL HISTORY:   Significant other/partner[ ]  Children[X ]  Lutheran/Spirituality:  Substance hx:  [ ]   Tobacco hx:  [ ]   Alcohol hx: [ ]   Living Situation: [ ]Home  [ ]Long term care  [ ]Rehab [X ]Other assisted living  Home Services: [ ] HHA [ ] Visting RN [ ] Hospice  Occupation:  Home Opioid hx:  [ ] Y [ ] N [ ] I-Stop Reference No:    ADVANCE DIRECTIVES:    [ ] Full Code [X ] DNR  MOLST  [ ]  Living Will  [ ]   DECISION MAKER(s):  [ ] Health Care Proxy(s)  [ ] Surrogate(s)  [ ] Guardian           Name(s): Phone Number(s): Paige Powell [phone]    BASELINE (I)ADL(s) (prior to admission):  [place]: [ ]Total  [ ] Moderate [ ]Dependent  Palliative Performance Status Version 2:         %    http://npcrc.org/files/news/palliative_performance_scale_ppsv2.pdf    Allergies    penicillin (Unknown)    Intolerances    MEDICATIONS  (STANDING):  albuterol/ipratropium for Nebulization 3 milliLiter(s) Nebulizer every 12 hours  amLODIPine   Tablet 2.5 milliGRAM(s) Oral daily  atorvastatin 20 milliGRAM(s) Oral at bedtime  budesonide  80 MICROgram(s)/formoterol 4.5 MICROgram(s) Inhaler 2 Puff(s) Inhalation two times a day  chlorhexidine 2% Cloths 1 Application(s) Topical <User Schedule>  dextrose 5%. 1000 milliLiter(s) (50 mL/Hr) IV Continuous <Continuous>  dextrose 5%. 1000 milliLiter(s) (100 mL/Hr) IV Continuous <Continuous>  dextrose 50% Injectable 25 Gram(s) IV Push once  dextrose 50% Injectable 12.5 Gram(s) IV Push once  dextrose 50% Injectable 25 Gram(s) IV Push once  folic acid 1 milliGRAM(s) Oral daily  glucagon  Injectable 1 milliGRAM(s) IntraMuscular once  heparin   Injectable 5000 Unit(s) SubCutaneous every 8 hours  insulin glargine Injectable (LANTUS) 10 Unit(s) SubCutaneous every morning  insulin lispro (ADMELOG) corrective regimen sliding scale   SubCutaneous three times a day before meals  insulin lispro (ADMELOG) corrective regimen sliding scale   SubCutaneous at bedtime  insulin lispro Injectable (ADMELOG) 3 Unit(s) SubCutaneous three times a day before meals  melatonin 5 milliGRAM(s) Oral at bedtime  pantoprazole    Tablet 40 milliGRAM(s) Oral two times a day  polyethylene glycol 3350 17 Gram(s) Oral <User Schedule>  senna 2 Tablet(s) Oral every 12 hours  sucralfate 1 Gram(s) Oral two times a day    MEDICATIONS  (PRN):  acetaminophen     Tablet .. 650 milliGRAM(s) Oral every 6 hours PRN Moderate Pain (4 - 6)  albuterol/ipratropium for Nebulization 3 milliLiter(s) Nebulizer every 4 hours PRN Shortness of Breath and/or Wheezing  aluminum hydroxide/magnesium hydroxide/simethicone Suspension 30 milliLiter(s) Oral every 4 hours PRN Dyspepsia  dextrose Oral Gel 15 Gram(s) Oral once PRN Blood Glucose LESS THAN 70 milliGRAM(s)/deciliter  lactulose Syrup 10 Gram(s) Oral every 12 hours PRN constipation  traMADol 25 milliGRAM(s) Oral every 8 hours PRN Severe Pain (7 - 10)    PRESENT SYMPTOMS: [ ]Unable to obtain due to poor mentation   Source if other than patient:  [ ]Family   [ ]Team     Pain: [X ]yes [ ]no  QOL impact - limits quality   Location - abdomen, chest, arms, back                    Aggravating factors - unclear  Quality -  Radiation -   Timing- constant  Severity (0-10 scale): 9/10  Minimal acceptable level (0-10 scale):     CPOT:    https://www.sccm.org/getattachment/wel63k50-3o7t-4y6g-8h6z-2846u3832z4s/Critical-Care-Pain-Observation-Tool-(CPOT)    PAIN AD Score:   [email or web address]/painad.pdf (press ctrl +  left click to view)    Dyspnea:                           [ ]None[ ]Mild [ ]Moderate [ ]Severe     Respiratory Distress Observation Scale (RDOS):   A score of 0 to 2 signifies little or no respiratory distress, 3 signifies mild distress, scores 4 to 6 indicate moderate distress, and scores greater than 7 signify severe distress  [email or web address]/files/PDFS/444488-bkxtketnqss-nodbuixq-kliwejkzure-kodyf.pdf    Anxiety:                             [ ]None[ ]Mild [ ]Moderate [ ]Severe   Fatigue:                             [ ]None[ ]Mild [ ]Moderate [ ]Severe   Nausea:                             [ ]None[ ]Mild [ ]Moderate [ ]Severe   Loss of appetite:              [ ]None[ ]Mild [ ]Moderate [ ]Severe   Constipation:                    [ ]None[ ]Mild [ ]Moderate [ ]Severe    Other Symptoms:  [X ]All other review of systems negative     Palliative Performance Status Version 2:         %    http://npcrc.org/files/news/palliative_performance_scale_ppsv2.pdf  PHYSICAL EXAM:  Vital Signs Last 24 Hrs  T(C): 36.1 (14 Apr 2023 13:01), Max: 37 (14 Apr 2023 05:00)  T(F): 97 (14 Apr 2023 13:01), Max: 98.6 (14 Apr 2023 05:00)  HR: 120 (14 Apr 2023 13:01) (109 - 120)  BP: 135/74 (14 Apr 2023 13:01) (135/74 - 149/74)  BP(mean): --  RR: 18 (14 Apr 2023 13:01) (18 - 20)  SpO2: 98% (14 Apr 2023 13:01) (96% - 98%)    Parameters below as of 14 Apr 2023 13:01  Patient On (Oxygen Delivery Method): nasal cannula     I&O's Summary    13 Apr 2023 07:01  -  14 Apr 2023 07:00  --------------------------------------------------------  IN: 0 mL / OUT: 250 mL / NET: -250 mL        GENERAL:  [X ] No acute distress [ ]Lethargic  [ ]Unarousable  [ ]Verbal  [ ]Non-Verbal [ ]Cachexia    BEHAVIORAL/PSYCH:  [ ]Alert and Oriented x  [ ] Anxiety [ ] Delirium [ ] Agitation [X ] Calm   EYES: [X ] No scleral icterus [ ] Scleral icterus [ ] Closed  ENMT:  [ ]Dry mouth  [ ]No external oral lesions [ X] No external ear or nose lesions  CARDIOVASCULAR:  [ ]Regular [ ]Irregular [ ]Tachy [ ]Not Tachy  [ ]Jacobo [ ] Edema [ ] No edema  PULMONARY:  [ ]Tachypnea  [ ]Audible excessive secretions [X ] No labored breathing [ ] labored breathing  GASTROINTESTINAL: [ ]Soft  [ ]Distended  [ X]Not distended [ ]Non tender [ ]Tender  MUSCULOSKELETAL: [ ]No clubbing [ ] clubbing  [X ] No cyanosis [ ] cyanosis  NEUROLOGIC: [X ]No focal deficits  [ ]Follows commands  [ ]Does not follow commands  [ ]Cognitive impairment  [ ]Dysphagia  [ ]Dysarthria  [ ]Paresis   SKIN: [ ] Jaundiced [X ] Non-jaundiced [ ]Rash [ ]No Rash [ ] Warm [ ] Dry  MISC/LINES: [ ] ET tube [ ] Trach [ ]NGT/OGT [ ]PEG [ ][place]    CRITICAL CARE:  [ ] Shock Present  [ ]Septic [ ]Cardiogenic [ ]Neurologic [ ]Hypovolemic  [ ]  Vasopressors [ ]  Inotropes   [ ]Respiratory failure present [ ]Mechanical ventilation [ ]Non-invasive ventilatory support [ ]High flow  [ ]Acute  [ ]Chronic [ ]Hypoxic  [ ]Hypercarbic [ ]Other  [ ]Other organ failure     LABS: reviewed by me                        7.6    11.26 )-----------( 240      ( 14 Apr 2023 08:00 )             24.1   04-14    132<L>  |  97<L>  |  44<H>  ----------------------------<  160<H>  5.3<H>   |  24  |  1.4    Ca    9.3      14 Apr 2023 11:51  Mg     1.7     04-14    TPro  6.7  /  Alb  3.2<L>  /  TBili  0.3  /  DBili  x   /  AST  14  /  ALT  15  /  AlkPhos  129<H>  04-14        RADIOLOGY & ADDITIONAL STUDIES: reviewed by me    CXR 4/14/23  Bilateral opacities, overall unchanged.    PROTEIN CALORIE MALNUTRITION PRESENT: [ ]mild [ ]moderate [ ]severe [ ]underweight [ ]morbid obesity  https://www.andeal.org/vault/2440/web/files/ONC/Table_Clinical%20Characteristics%20to%20Document%20Malnutrition-White%20JV%20et%20al%202012.pdf    Height (cm): 152.4 (04-03-23 @ 01:46), 155 (03-23-23 @ 11:02)  Weight (kg): 47.6 (04-03-23 @ 01:46), 43.1 (03-22-23 @ 13:03)  BMI (kg/m2): 20.5 (04-03-23 @ 01:46), 17.9 (03-23-23 @ 11:02)    [ ]PPSV2 < or = to 30% [ ]significant weight loss  [ ]poor nutritional intake  [ ]anasarca      [ ]Artificial Nutrition      REFERRALS:   [ ]Chaplaincy  [ ]Hospice  [ ]Child Life  [ ]Social Work  [ ]Case management [ ]Holistic Therapy     Palliative care education provided to patient and/or family    Goals of Care Document:     ______________  James Mayfield MD  Palliative Medicine  [facility]   of Geriatric and Palliative Medicine  [phone]

## 2023-04-14 NOTE — PROGRESS NOTE ADULT - SUBJECTIVE AND OBJECTIVE BOX
No acute events overnight. Did not have abd pain during my exam initially but had some discomfort towards the end of the encounter. No issues per the pt. Endorsed having a large BM early this AM.     VITAL SIGNS:  T(C): 37 (04-14-23 @ 05:00), Max: 37 (04-14-23 @ 05:00)  HR: 109 (04-14-23 @ 05:00) (104 - 110)  BP: 149/74 (04-14-23 @ 05:00) (148/72 - 162/76)  RR: 20 (04-14-23 @ 05:00) (18 - 20)  SpO2: 97% (04-14-23 @ 05:00) (96% - 99%)      PHYSICAL EXAM:  GENERAL: NAD  HEAD: Atraumatic, Normocephalic  NERVOUS SYSTEM: hard of hearing, answers questions appropriately; Moves all ext  CHEST/LUNG: coarse bs  HEART: Regular rate and rhythm. Normal S1/S1. No murmurs, rubs, or gallops  ABDOMEN: notender. Soft, Nondistended; Bowel sounds present  EXTREMITIES: no ble edema      MEDICATIONS:  MEDICATIONS  (STANDING):  albuterol/ipratropium for Nebulization 3 milliLiter(s) Nebulizer every 12 hours  amLODIPine   Tablet 2.5 milliGRAM(s) Oral daily  atorvastatin 20 milliGRAM(s) Oral at bedtime  budesonide  80 MICROgram(s)/formoterol 4.5 MICROgram(s) Inhaler 2 Puff(s) Inhalation two times a day  chlorhexidine 2% Cloths 1 Application(s) Topical <User Schedule>  dextrose 5%. 1000 milliLiter(s) (50 mL/Hr) IV Continuous <Continuous>  dextrose 5%. 1000 milliLiter(s) (100 mL/Hr) IV Continuous <Continuous>  dextrose 50% Injectable 25 Gram(s) IV Push once  dextrose 50% Injectable 12.5 Gram(s) IV Push once  dextrose 50% Injectable 25 Gram(s) IV Push once  folic acid 1 milliGRAM(s) Oral daily  glucagon  Injectable 1 milliGRAM(s) IntraMuscular once  heparin   Injectable 5000 Unit(s) SubCutaneous every 8 hours  insulin glargine Injectable (LANTUS) 10 Unit(s) SubCutaneous every morning  insulin lispro (ADMELOG) corrective regimen sliding scale   SubCutaneous three times a day before meals  insulin lispro (ADMELOG) corrective regimen sliding scale   SubCutaneous at bedtime  insulin lispro Injectable (ADMELOG) 3 Unit(s) SubCutaneous three times a day before meals  magnesium sulfate  IVPB 2 Gram(s) IV Intermittent once  melatonin 5 milliGRAM(s) Oral at bedtime  pantoprazole    Tablet 40 milliGRAM(s) Oral two times a day  polyethylene glycol 3350 17 Gram(s) Oral <User Schedule>  senna 2 Tablet(s) Oral every 12 hours  sodium zirconium cyclosilicate 10 Gram(s) Oral once  sucralfate 1 Gram(s) Oral two times a day    MEDICATIONS  (PRN):  acetaminophen     Tablet .. 650 milliGRAM(s) Oral every 6 hours PRN Moderate Pain (4 - 6)  albuterol/ipratropium for Nebulization 3 milliLiter(s) Nebulizer every 4 hours PRN Shortness of Breath and/or Wheezing  aluminum hydroxide/magnesium hydroxide/simethicone Suspension 30 milliLiter(s) Oral every 4 hours PRN Dyspepsia  dextrose Oral Gel 15 Gram(s) Oral once PRN Blood Glucose LESS THAN 70 milliGRAM(s)/deciliter  lactulose Syrup 10 Gram(s) Oral every 12 hours PRN constipation  traMADol 25 milliGRAM(s) Oral every 8 hours PRN Severe Pain (7 - 10)      LABS:                        7.6    11.26 )-----------( 240      ( 14 Apr 2023 08:00 )             24.1     04-14    133<L>  |  102  |  47<H>  ----------------------------<  118<H>  5.6<H>   |  22  |  1.5    Ca    9.2      14 Apr 2023 08:00  Mg     1.7     04-14

## 2023-04-14 NOTE — PROGRESS NOTE ADULT - ASSESSMENT
89 year old female with PMHx of HTN, DM, CKD, active smoker, COPD not on home O2 recent admission for aspiration PNA with incidental finding of distal DVT presented to the ED for hypoxia    #Acute Hypoxic respiratory failure likely 2/2 asp PNA/Bilateral with sepsis POA  - CT angio: no PE but B/L lower lobe and R middle lobe consolidations   - COPD not on home 02  - Active smoker   - Recent Distal DVT  - intermittently on 2 LNC  - RVP, strep, legionella, bcx and MRSA negative   - procal 0.14  - s/p 7d course of cefepime and levaquin, completed 4/9  - prednisone taper complete on 4/12  - echo ef 55%, GIDD, mod MS, mild AS   - diet per speech and swallow   - PT: walked 3 steps desat to 77% on 1L NC, returned to 96% on 4L NC  - repeat cxr    #Dyspepsia  #Peptic Ulcer Disease  #Hiatal hernia  #Intermittent chest/epigastric/LUQ/back pain  - TTP - improving  - KUB 4/11: copious stool seen throughout the colon. nonobstructive bowel gas pattern. suggestion of contrast in the stomach  - troponin 0.01  - lactate 1.3 -> 1.2  - GI consult: high risk for endoscopy, c/w conservative management  - bowel regimen miralax, senna, tap water enema q8 prn, lactulose prn   - changed PPI to IV BID, c/w carafate 1g BID  - mesenteric doppler: Nonconclusive examination of aorta and mesenteric vessels due to presence of bowel gas  - repeat KUB 4/13: decreased stool burden  - repeat mesenteric doppler 4/14 given decreased stool burden  - repeat trop, ckmb, cmp  - tramadol for pain control    #LLE distal DVT   - Duplex (3/24/23) noted thrombus in left gastrocnemius and peroneal veins  - repeat duplex unchanged, was seen by vascular on recent admission  - was started on Eliquis given pt is not ambulating and remains on bed but pt has hx of PUD, and c/o of intermittent epigastric burn/ discomfort, would be at risk of GIB, daughter Meena Powell (at bedside) decided to hold on AC  - repeat duplex    # hyponatremia - stable  - HCTZ on hold    #CKD III  - cr at baseline  - keep off lisinopril   - was following with Nephrology in [place]  - encourage PO intake    #HTN  - was on amlodipine 2.5 mg daily, HCTZ 12.5mg daily and lisinopril 10mg daily in assisted living   - holding HCTZ and lisinopril     #DM  - takes pioglitazone 15mg daily  - c/w insulin lantus 10U at bedtime, lispro 3U TID; monitor FS  - A1c 7.4% (3/2023)    DVT pp: heparin  GI ppx: protonix  Diet: Soft and bite sized/DASH/CC  Code: DNR/DNI  Pending: dc to [facility] when able, prep for dc 4/15, GI following, oxygen arrangement if needed 89 year old female with PMHx of HTN, DM, CKD, active smoker, COPD not on home O2 recent admission for aspiration PNA with incidental finding of distal DVT presented to the ED for hypoxia    #Acute Hypoxic respiratory failure likely 2/2 asp PNA/Bilateral with sepsis POA  - CT angio: no PE but B/L lower lobe and R middle lobe consolidations   - COPD not on home 02  - Active smoker   - Recent Distal DVT  - intermittently on 2 LNC  - RVP, strep, legionella, bcx and MRSA negative   - procal 0.14  - s/p 7d course of cefepime and levaquin, completed 4/9  - prednisone taper complete on 4/12  - echo ef 55%, GIDD, mod MS, mild AS   - diet per speech and swallow   - PT: walked 3 steps desat to 77% on 1L NC, returned to 96% on 4L NC  - repeat cxr showed b/l opacities unchanged    #Dyspepsia  #Peptic Ulcer Disease  #Hiatal hernia  #Intermittent chest/epigastric/LUQ/back pain  - TTP - improving  - KUB 4/11: copious stool seen throughout the colon. nonobstructive bowel gas pattern. suggestion of contrast in the stomach  - troponin 0.01  - lactate 1.3 -> 1.2  - GI consult: high risk for endoscopy, c/w conservative management  - bowel regimen miralax, senna, tap water enema q8 prn, lactulose prn   - changed PPI to IV BID, c/w carafate 1g BID  - mesenteric doppler: Nonconclusive examination of aorta and mesenteric vessels due to presence of bowel gas  - repeat KUB 4/13: decreased stool burden  - repeat mesenteric doppler 4/14 given decreased stool burden  - repeat trop, ckmb, cmp  - tramadol for pain control    #LLE distal DVT   - Duplex (3/24/23) noted thrombus in left gastrocnemius and peroneal veins  - repeat duplex unchanged, was seen by vascular on recent admission  - was started on Eliquis given pt is not ambulating and remains on bed but pt has hx of PUD, and c/o of intermittent epigastric burn/ discomfort, would be at risk of GIB, daughter Meena Powell (at bedside) decided to hold on AC  - repeat duplex    # hyponatremia - stable  - HCTZ on hold    #CKD III  - cr at baseline  - keep off lisinopril   - was following with Nephrology in [place]  - encourage PO intake    #HTN  - was on amlodipine 2.5 mg daily, HCTZ 12.5mg daily and lisinopril 10mg daily in assisted living   - holding HCTZ and lisinopril     #DM  - takes pioglitazone 15mg daily  - c/w insulin lantus 10U at bedtime, lispro 3U TID; monitor FS  - A1c 7.4% (3/2023)    DVT pp: heparin  GI ppx: protonix  Diet: Soft and bite sized/DASH/CC  Code: DNR/DNI  Pending: [facility], prep for dc 4/15, GI following, oxygen arrangement if needed 89 year old female with PMHx of HTN, DM, CKD, active smoker, COPD not on home O2 recent admission for aspiration PNA with incidental finding of distal DVT presented to the ED for hypoxia    #Acute Hypoxic respiratory failure likely 2/2 asp PNA/Bilateral with sepsis POA  - CT angio: no PE but B/L lower lobe and R middle lobe consolidations   - COPD not on home 02  - Active smoker   - Recent Distal DVT  - intermittently on 2 LNC  - RVP, strep, legionella, bcx and MRSA negative   - procal 0.14  - s/p 7d course of cefepime and levaquin, completed 4/9  - prednisone taper complete on 4/12  - echo ef 55%, GIDD, mod MS, mild AS   - diet per speech and swallow   - PT: walked 3 steps desat to 77% on 1L NC, returned to 96% on 4L NC  - repeat cxr showed b/l opacities unchanged    #Dyspepsia  #Peptic Ulcer Disease  #Hiatal hernia  #Intermittent chest/epigastric/LUQ/back pain  - TTP - improving  - KUB 4/11: copious stool seen throughout the colon. nonobstructive bowel gas pattern. suggestion of contrast in the stomach  - troponin 0.01  - lactate 1.3 -> 1.2  - GI consult: high risk for endoscopy, c/w conservative management  - bowel regimen miralax, senna, tap water enema q8 prn, lactulose prn   - changed PPI to IV BID, c/w carafate 1g BID  - mesenteric doppler: Nonconclusive examination of aorta and mesenteric vessels due to presence of bowel gas  - repeat KUB 4/13: decreased stool burden  - repeat mesenteric doppler 4/14 given decreased stool burden  - repeat trop, ckmb, cmp  - tramadol for pain control    #LLE distal DVT   - Duplex (3/24/23) noted thrombus in left gastrocnemius and peroneal veins  - repeat duplex unchanged, was seen by vascular on recent admission  - was started on Eliquis given pt is not ambulating and remains on bed but pt has hx of PUD, and c/o of intermittent epigastric burn/ discomfort, would be at risk of GIB, daughter Meena Powell (at bedside) decided to hold on AC  - repeat duplex    # hyponatremia - stable  - HCTZ on hold    #CKD III  - cr at baseline  - keep off lisinopril   - was following with Nephrology in [place]  - encourage PO intake    #HTN  - was on amlodipine 2.5 mg daily, HCTZ 12.5mg daily and lisinopril 10mg daily in assisted living   - holding HCTZ and lisinopril     #DM  - takes pioglitazone 15mg daily  - c/w insulin lantus 10U at bedtime, lispro 3U TID; monitor FS  - A1c 7.4% (3/2023)    DVT pp: heparin  GI ppx: protonix  Diet: Soft and bite sized/DASH/CC  Code: DNR/DNI  Pending: [facility], prep for dc 4/15, GI following, oxygen arrangement if needed  Family: Spoke with pt daughter at bedside. Updated her on plan of care and she is in agreement. She asked to learn more about hospice and CMO, palliative was consulted and spoke with her at length. Was informed by palliative that the daughter would like a hospice consult and will likely be done on monday

## 2023-04-14 NOTE — CONSULT NOTE ADULT - PROBLEM SELECTOR RECOMMENDATION 6
- will follow  - d/w team  ______________  James Mayfield MD  Palliative Medicine  [facility]  [phone]

## 2023-04-14 NOTE — PROGRESS NOTE ADULT - SUBJECTIVE AND OBJECTIVE BOX
Gastroenterology progress note:     Patient is a 89y old  Female who presents with a chief complaint of SOB (14 Apr 2023 10:08)       Admitted on: 04-03-23    We are following the patient for: epigastric pain     Interval History:  Patient noted to have gas distension and mild-moderate stool and gas filled colon on KUB yesterday. s/p tap water enemas, had a bowel movement with improvement on repeat KUB as well. Patient still continues to report burning epigastric pain radiating to her back.     PAST MEDICAL & SURGICAL HISTORY:  Hypertension      Diabetes mellitus      Chronic kidney disease, unspecified CKD stage          MEDICATIONS  (STANDING):  albuterol/ipratropium for Nebulization 3 milliLiter(s) Nebulizer every 12 hours  amLODIPine   Tablet 2.5 milliGRAM(s) Oral daily  atorvastatin 20 milliGRAM(s) Oral at bedtime  budesonide  80 MICROgram(s)/formoterol 4.5 MICROgram(s) Inhaler 2 Puff(s) Inhalation two times a day  chlorhexidine 2% Cloths 1 Application(s) Topical <User Schedule>  dextrose 5%. 1000 milliLiter(s) (50 mL/Hr) IV Continuous <Continuous>  dextrose 5%. 1000 milliLiter(s) (100 mL/Hr) IV Continuous <Continuous>  dextrose 50% Injectable 25 Gram(s) IV Push once  dextrose 50% Injectable 12.5 Gram(s) IV Push once  dextrose 50% Injectable 25 Gram(s) IV Push once  folic acid 1 milliGRAM(s) Oral daily  glucagon  Injectable 1 milliGRAM(s) IntraMuscular once  heparin   Injectable 5000 Unit(s) SubCutaneous every 8 hours  insulin glargine Injectable (LANTUS) 10 Unit(s) SubCutaneous every morning  insulin lispro (ADMELOG) corrective regimen sliding scale   SubCutaneous three times a day before meals  insulin lispro (ADMELOG) corrective regimen sliding scale   SubCutaneous at bedtime  insulin lispro Injectable (ADMELOG) 3 Unit(s) SubCutaneous three times a day before meals  magnesium sulfate  IVPB 2 Gram(s) IV Intermittent once  melatonin 5 milliGRAM(s) Oral at bedtime  pantoprazole    Tablet 40 milliGRAM(s) Oral two times a day  polyethylene glycol 3350 17 Gram(s) Oral <User Schedule>  senna 2 Tablet(s) Oral every 12 hours  sodium zirconium cyclosilicate 10 Gram(s) Oral once  sucralfate 1 Gram(s) Oral two times a day    MEDICATIONS  (PRN):  acetaminophen     Tablet .. 650 milliGRAM(s) Oral every 6 hours PRN Moderate Pain (4 - 6)  albuterol/ipratropium for Nebulization 3 milliLiter(s) Nebulizer every 4 hours PRN Shortness of Breath and/or Wheezing  aluminum hydroxide/magnesium hydroxide/simethicone Suspension 30 milliLiter(s) Oral every 4 hours PRN Dyspepsia  dextrose Oral Gel 15 Gram(s) Oral once PRN Blood Glucose LESS THAN 70 milliGRAM(s)/deciliter  lactulose Syrup 10 Gram(s) Oral every 12 hours PRN constipation  traMADol 25 milliGRAM(s) Oral every 8 hours PRN Severe Pain (7 - 10)      Allergies  penicillin (Unknown)      Review of Systems:   Cardiovascular:  No Chest Pain, No Palpitations  Respiratory:  No Cough, No Dyspnea  Gastrointestinal:  As described in HPI    Physical Examination:  T(C): 37 (04-14-23 @ 05:00), Max: 37 (04-14-23 @ 05:00)  HR: 109 (04-14-23 @ 05:00) (104 - 110)  BP: 149/74 (04-14-23 @ 05:00) (148/72 - 162/76)  RR: 20 (04-14-23 @ 05:00) (18 - 20)  SpO2: 97% (04-14-23 @ 05:00) (96% - 99%)      04-13-23 @ 07:01  -  04-14-23 @ 07:00  --------------------------------------------------------  IN: 0 mL / OUT: 250 mL / NET: -250 mL      Constitutional: No acute distress.  Respiratory:  No signs of respiratory distress. Lung sounds are clear bilaterally.  Cardiovascular:  S1 S2, Regular rate and rhythm.  Abdominal: Abdomen is soft, symmetric, and non-tender without distention. Bowel sounds are present and normoactive in all four quadrants  Skin: No rashes, No Jaundice.        Data: (reviewed by attending)                        7.6    11.26 )-----------( 240      ( 14 Apr 2023 08:00 )             24.1     Hgb trend:  7.6  04-14-23 @ 08:00  8.2  04-13-23 @ 07:17  9.5  04-12-23 @ 08:20        04-14    133<L>  |  102  |  47<H>  ----------------------------<  118<H>  5.6<H>   |  22  |  1.5    Ca    9.2      14 Apr 2023 08:00  Mg     1.7     04-14      Liver panel trend:  TBili <0.2   /   AST 18   /   ALT 11   /   AlkP 122   /   Tptn 5.9   /   Alb 2.6    /   DBili --      04-08  TBili 0.3   /   AST 11   /   ALT 8   /   AlkP 114   /   Tptn 6.0   /   Alb 2.7    /   DBili --      04-07  TBili <0.2   /   AST 11   /   ALT 6   /   AlkP 121   /   Tptn 5.9   /   Alb 2.5    /   DBili --      04-06  TBili <0.2   /   AST 16   /   ALT 10   /   AlkP 114   /   Tptn 6.5   /   Alb 2.7    /   DBili --      04-05             Radiology: (reviewed by attending)    < from: Xray Kidney Ureter Bladder (04.13.23 @ 20:40) >  IMPRESSION: Decreased stool burden    < end of copied text >

## 2023-04-15 LAB
ALBUMIN SERPL ELPH-MCNC: 2.9 G/DL — LOW (ref 3.5–5.2)
ALP SERPL-CCNC: 145 U/L — HIGH (ref 30–115)
ALT FLD-CCNC: 16 U/L — SIGNIFICANT CHANGE UP (ref 0–41)
ANION GAP SERPL CALC-SCNC: 8 MMOL/L — SIGNIFICANT CHANGE UP (ref 7–14)
AST SERPL-CCNC: 13 U/L — SIGNIFICANT CHANGE UP (ref 0–41)
BILIRUB SERPL-MCNC: 0.3 MG/DL — SIGNIFICANT CHANGE UP (ref 0.2–1.2)
BUN SERPL-MCNC: 39 MG/DL — HIGH (ref 10–20)
CALCIUM SERPL-MCNC: 10.1 MG/DL — SIGNIFICANT CHANGE UP (ref 8.4–10.4)
CHLORIDE SERPL-SCNC: 97 MMOL/L — LOW (ref 98–110)
CO2 SERPL-SCNC: 27 MMOL/L — SIGNIFICANT CHANGE UP (ref 17–32)
CREAT SERPL-MCNC: 1.2 MG/DL — SIGNIFICANT CHANGE UP (ref 0.7–1.5)
EGFR: 43 ML/MIN/1.73M2 — LOW
GLUCOSE BLDC GLUCOMTR-MCNC: 114 MG/DL — HIGH (ref 70–99)
GLUCOSE BLDC GLUCOMTR-MCNC: 158 MG/DL — HIGH (ref 70–99)
GLUCOSE BLDC GLUCOMTR-MCNC: 216 MG/DL — HIGH (ref 70–99)
GLUCOSE BLDC GLUCOMTR-MCNC: 315 MG/DL — HIGH (ref 70–99)
GLUCOSE SERPL-MCNC: 112 MG/DL — HIGH (ref 70–99)
HCT VFR BLD CALC: 30.2 % — LOW (ref 37–47)
HGB BLD-MCNC: 9.5 G/DL — LOW (ref 12–16)
MAGNESIUM SERPL-MCNC: 2.1 MG/DL — SIGNIFICANT CHANGE UP (ref 1.8–2.4)
MCHC RBC-ENTMCNC: 27.9 PG — SIGNIFICANT CHANGE UP (ref 27–31)
MCHC RBC-ENTMCNC: 31.5 G/DL — LOW (ref 32–37)
MCV RBC AUTO: 88.6 FL — SIGNIFICANT CHANGE UP (ref 81–99)
NRBC # BLD: 0 /100 WBCS — SIGNIFICANT CHANGE UP (ref 0–0)
PLATELET # BLD AUTO: 500 K/UL — HIGH (ref 130–400)
PMV BLD: 9.7 FL — SIGNIFICANT CHANGE UP (ref 7.4–10.4)
POTASSIUM SERPL-MCNC: 5.5 MMOL/L — HIGH (ref 3.5–5)
POTASSIUM SERPL-SCNC: 5.5 MMOL/L — HIGH (ref 3.5–5)
PROT SERPL-MCNC: 6.5 G/DL — SIGNIFICANT CHANGE UP (ref 6–8)
RBC # BLD: 3.41 M/UL — LOW (ref 4.2–5.4)
RBC # FLD: 16.6 % — HIGH (ref 11.5–14.5)
SODIUM SERPL-SCNC: 132 MMOL/L — LOW (ref 135–146)
WBC # BLD: 9.41 K/UL — SIGNIFICANT CHANGE UP (ref 4.8–10.8)
WBC # FLD AUTO: 9.41 K/UL — SIGNIFICANT CHANGE UP (ref 4.8–10.8)

## 2023-04-15 PROCEDURE — 93010 ELECTROCARDIOGRAM REPORT: CPT

## 2023-04-15 PROCEDURE — 99232 SBSQ HOSP IP/OBS MODERATE 35: CPT

## 2023-04-15 RX ORDER — FAMOTIDINE 10 MG/ML
40 INJECTION INTRAVENOUS
Refills: 0 | Status: DISCONTINUED | OUTPATIENT
Start: 2023-04-15 | End: 2023-04-17

## 2023-04-15 RX ORDER — ENOXAPARIN SODIUM 100 MG/ML
48 INJECTION SUBCUTANEOUS EVERY 12 HOURS
Refills: 0 | Status: DISCONTINUED | OUTPATIENT
Start: 2023-04-15 | End: 2023-04-15

## 2023-04-15 RX ORDER — SODIUM ZIRCONIUM CYCLOSILICATE 10 G/10G
10 POWDER, FOR SUSPENSION ORAL ONCE
Refills: 0 | Status: COMPLETED | OUTPATIENT
Start: 2023-04-15 | End: 2023-04-15

## 2023-04-15 RX ORDER — ENOXAPARIN SODIUM 100 MG/ML
50 INJECTION SUBCUTANEOUS EVERY 12 HOURS
Refills: 0 | Status: DISCONTINUED | OUTPATIENT
Start: 2023-04-15 | End: 2023-04-17

## 2023-04-15 RX ADMIN — PANTOPRAZOLE SODIUM 40 MILLIGRAM(S): 20 TABLET, DELAYED RELEASE ORAL at 17:27

## 2023-04-15 RX ADMIN — HEPARIN SODIUM 5000 UNIT(S): 5000 INJECTION INTRAVENOUS; SUBCUTANEOUS at 14:33

## 2023-04-15 RX ADMIN — INSULIN GLARGINE 10 UNIT(S): 100 INJECTION, SOLUTION SUBCUTANEOUS at 09:35

## 2023-04-15 RX ADMIN — BUDESONIDE AND FORMOTEROL FUMARATE DIHYDRATE 2 PUFF(S): 160; 4.5 AEROSOL RESPIRATORY (INHALATION) at 22:38

## 2023-04-15 RX ADMIN — Medication 1 MILLIGRAM(S): at 12:52

## 2023-04-15 RX ADMIN — Medication 1 GRAM(S): at 06:53

## 2023-04-15 RX ADMIN — POLYETHYLENE GLYCOL 3350 17 GRAM(S): 17 POWDER, FOR SOLUTION ORAL at 06:53

## 2023-04-15 RX ADMIN — BUDESONIDE AND FORMOTEROL FUMARATE DIHYDRATE 2 PUFF(S): 160; 4.5 AEROSOL RESPIRATORY (INHALATION) at 12:51

## 2023-04-15 RX ADMIN — Medication 2: at 21:26

## 2023-04-15 RX ADMIN — Medication 3 UNIT(S): at 12:51

## 2023-04-15 RX ADMIN — AMLODIPINE BESYLATE 2.5 MILLIGRAM(S): 2.5 TABLET ORAL at 06:52

## 2023-04-15 RX ADMIN — Medication 5 MILLIGRAM(S): at 21:15

## 2023-04-15 RX ADMIN — CHLORHEXIDINE GLUCONATE 1 APPLICATION(S): 213 SOLUTION TOPICAL at 06:55

## 2023-04-15 RX ADMIN — Medication 100 MILLIGRAM(S): at 23:01

## 2023-04-15 RX ADMIN — Medication 3 MILLILITER(S): at 19:44

## 2023-04-15 RX ADMIN — HEPARIN SODIUM 5000 UNIT(S): 5000 INJECTION INTRAVENOUS; SUBCUTANEOUS at 06:53

## 2023-04-15 RX ADMIN — SENNA PLUS 2 TABLET(S): 8.6 TABLET ORAL at 06:52

## 2023-04-15 RX ADMIN — SODIUM ZIRCONIUM CYCLOSILICATE 10 GRAM(S): 10 POWDER, FOR SUSPENSION ORAL at 23:01

## 2023-04-15 RX ADMIN — ATORVASTATIN CALCIUM 20 MILLIGRAM(S): 80 TABLET, FILM COATED ORAL at 21:15

## 2023-04-15 RX ADMIN — POLYETHYLENE GLYCOL 3350 17 GRAM(S): 17 POWDER, FOR SOLUTION ORAL at 21:16

## 2023-04-15 RX ADMIN — Medication 1: at 12:51

## 2023-04-15 RX ADMIN — Medication 3 UNIT(S): at 17:25

## 2023-04-15 RX ADMIN — Medication 2: at 17:26

## 2023-04-15 RX ADMIN — Medication 3 MILLILITER(S): at 09:00

## 2023-04-15 RX ADMIN — PANTOPRAZOLE SODIUM 40 MILLIGRAM(S): 20 TABLET, DELAYED RELEASE ORAL at 06:52

## 2023-04-15 RX ADMIN — Medication 3 UNIT(S): at 09:42

## 2023-04-15 RX ADMIN — Medication 1 MILLIGRAM(S): at 14:48

## 2023-04-15 RX ADMIN — Medication 1 GRAM(S): at 17:27

## 2023-04-15 RX ADMIN — SENNA PLUS 2 TABLET(S): 8.6 TABLET ORAL at 17:27

## 2023-04-15 NOTE — PROGRESS NOTE ADULT - SUBJECTIVE AND OBJECTIVE BOX
HOSPITALIST PROGRESS NOTE     Interval Events:        admit for SOB, Asp PNA  ?  Chronic DVT, Hx GI bleed  stable Hgb   on 3 L O2   updated finding, acre plan with daughter at bedside who expressed to cont Abx, full AC, not ready for comfort care  acute resp failure => pending VQ scan        REVIEW OF SYSTEMS:  Negative except as noted above.     VITALS:  T(F): 97.7 (04-15 @ 13:00), Max: 97.7 (04-15 @ 13:00)  HR: 128 (04-15 @ 17:42) (109 - 128)  BP: 145/70 (04-15 @ 13:00) (145/70 - 145/70)  RR: 18 (04-15 @ 13:00) (18 - 18)  SpO2: 94% (04-15 @ 17:45) (87% - 99%)    HEIGHT/WEIGHT/BMI:   Height (cm): 152.4 (04-03), 155 (03-23)  Weight (kg): 47.6 (04-03), 43.1 (03-22)  BMI (kg/m2): 20.5 (04-03), 17.9 (03-23)    PHYSICAL EXAM:   PHYSICAL EXAM:  GENERAL: NAD, lying in bed comfortably  NERVOUS SYSTEM:  Alert & Oriented X3, speech clear. No deficits   HEAD:  Atraumatic, Normocephalic  EYES: EOMI, conjunctiva and sclera clear  ENT: Moist mucous membranes  NECK: Supple, No JVD  CHEST/LUNG: Clear to auscultation bilaterally; Unlabored respirations  HEART: Regular rate and rhythm  ABDOMEN: Bowel sounds present; Soft, Nontender, Nondistended  EXTREMITIES: No clubbing, cyanosis, or edema  MSK: FROM all 4 extremities, full and equal strength  SKIN: No rashes or lesions    I/Os:     MEDICATIONS:   acetaminophen     Tablet .. 650 milliGRAM(s) Oral every 6 hours PRN  albuterol/ipratropium for Nebulization 3 milliLiter(s) Nebulizer every 12 hours  albuterol/ipratropium for Nebulization 3 milliLiter(s) Nebulizer every 4 hours PRN  aluminum hydroxide/magnesium hydroxide/simethicone Suspension 30 milliLiter(s) Oral every 4 hours PRN  amLODIPine   Tablet 2.5 milliGRAM(s) Oral daily  atorvastatin 20 milliGRAM(s) Oral at bedtime  budesonide  80 MICROgram(s)/formoterol 4.5 MICROgram(s) Inhaler 2 Puff(s) Inhalation two times a day  chlorhexidine 2% Cloths 1 Application(s) Topical <User Schedule>  clindamycin IVPB 300 milliGRAM(s) IV Intermittent once  clindamycin IVPB 300 milliGRAM(s) IV Intermittent every 8 hours  clindamycin IVPB      dextrose 5%. 1000 milliLiter(s) (50 mL/Hr) IV Continuous <Continuous>  dextrose 5%. 1000 milliLiter(s) (100 mL/Hr) IV Continuous <Continuous>  dextrose 50% Injectable 25 Gram(s) IV Push once  dextrose 50% Injectable 12.5 Gram(s) IV Push once  dextrose 50% Injectable 25 Gram(s) IV Push once  dextrose Oral Gel 15 Gram(s) Oral once PRN  enoxaparin Injectable 48 milliGRAM(s) SubCutaneous every 12 hours  famotidine    Tablet 40 milliGRAM(s) Oral two times a day  folic acid 1 milliGRAM(s) Oral daily  glucagon  Injectable 1 milliGRAM(s) IntraMuscular once  insulin glargine Injectable (LANTUS) 10 Unit(s) SubCutaneous every morning  insulin lispro (ADMELOG) corrective regimen sliding scale   SubCutaneous three times a day before meals  insulin lispro (ADMELOG) corrective regimen sliding scale   SubCutaneous at bedtime  insulin lispro Injectable (ADMELOG) 3 Unit(s) SubCutaneous three times a day before meals  lactulose Syrup 10 Gram(s) Oral every 12 hours PRN  melatonin 5 milliGRAM(s) Oral at bedtime  pantoprazole    Tablet 40 milliGRAM(s) Oral two times a day  polyethylene glycol 3350 17 Gram(s) Oral <User Schedule>  senna 2 Tablet(s) Oral every 12 hours  sodium zirconium cyclosilicate 10 Gram(s) Oral once  sodium zirconium cyclosilicate 10 Gram(s) Oral once  sucralfate 1 Gram(s) Oral two times a day  traMADol 25 milliGRAM(s) Oral every 8 hours PRN    LABS:                         9.5    9.41  )-----------( 500      ( 15 Apr 2023 08:17 )             30.2     132<L>  |  97<L>  |  39<H>  ----------------------------<  112<H>          (04-15-23 @ 08:17)  5.5<H>   |  27  |  1.2    Ca    10.1          (04-15-23 @ 08:17)  Mg     2.1         (04-15-23 @ 08:17)    TPro  6.5  /  Alb  2.9<L>  /  TBili  0.3  /  DBili  x   /  AST  13  /  ALT  16  /  AlkPhos  145<H>       04-15-23 @ 08:17      Blood Glucose (Past 24 hours):  216 mg/dL (04-15 @ 17:03)  158 mg/dL (04-15 @ 11:56)  114 mg/dL (04-15 @ 08:11)  236 mg/dL (04-14 @ 23:10)  64 mg/dL (04-14 @ 21:28)    DIET:   Diet, Soft and Bite Sized:   Consistent Carbohydrate No Snacks  DASH/TLC Sodium & Cholesterol Restricted (04-03-23 @ 08:44) [Active]        RADIOLOGY:

## 2023-04-15 NOTE — CHART NOTE - NSCHARTNOTEFT_GEN_A_CORE
Limited Note:  patient hospital length of stay >7days     Medical:  89 year old female with PMHx of HTN, DM, CKD, active smoker, COPD not on home O2 recent admission for aspiration PNA with incidental finding of distal DVT presented to the ED for hypoxia  #Acute Hypoxic respiratory failure likely 2/2 asp PNA/Bilateral with sepsis POA    Nutrition:  Good appetite/PO intake during admission, consuming % of meal trays  SLP recommendations for soft and bite size thin liquids 4/3     Diet, Soft and Bite Sized:   Consistent Carbohydrate {No Snacks}  DASH/TLC {Sodium & Cholesterol Restricted} (04-03-23 @ 08:44) [Active]    Anthropometrics:  Height: 152.4cm  Weight  47.6kg  BMI: 20.5kg/m2  Usual Weight: 43.1kg 3/22/23   no significant unintentional weight loss     POCT:  CAPILLARY BLOOD GLUCOSE  POCT Blood Glucose.: 149 mg/dL (11 Apr 2023 12:01)  POCT Blood Glucose.: 135 mg/dL (11 Apr 2023 07:38)  POCT Blood Glucose.: 141 mg/dL (10 Apr 2023 22:08)  POCT Blood Glucose.: 228 mg/dL (10 Apr 2023 16:50)    LABS:                      9.2    12.83 )-----------( 567      ( 11 Apr 2023 07:09 )             29.6     04-11    135  |  101  |  67<HH>  ----------------------------<  114<H>  5.8<H>   |  25  |  1.6<H>    Ca    9.7      11 Apr 2023 07:09  Mg     1.8     04-11    cognition confused  no edema noted in flow sheets   no pressure injuries noted in flow sheets     patient at no nutrition risk, will follow up PRN or within 7-10days  Contact RD if needed  Amargo Zulema, #[id number] spectra or via TEAMS
Per ID: no Abx => DC'ed Clinda

## 2023-04-15 NOTE — PROGRESS NOTE ADULT - ASSESSMENT
· Assessment	  89 year old female with PMHx of HTN, DM, CKD, active smoker, COPD not on home O2 recent admission for aspiration PNA with incidental finding of distal DVT presented to the ED for hypoxia    #Acute Hypoxic respiratory failure likely 2/2 asp PNA/Bilateral with sepsis POA  - CT angio: no PE but B/L lower lobe and R middle lobe consolidations   - COPD not on home 02  - Active smoker   - Recent Distal DVT  - intermittently on 2 LNC  - RVP, strep, legionella, bcx and MRSA negative   - procal 0.14  - s/p 7d course of cefepime and levaquin, completed 4/9  - prednisone taper complete on 4/12  - echo ef 55%, GIDD, mod MS, mild AS   - diet per speech and swallow   - PT: walked 3 steps desat to 77% on 1L NC, returned to 96% on 4L NC  - repeat cxr showed b/l opacities unchanged    #Dyspepsia  #Peptic Ulcer Disease  #Hiatal hernia  #Intermittent chest/epigastric/LUQ/back pain  - TTP - improving  - KUB 4/11: copious stool seen throughout the colon. nonobstructive bowel gas pattern. suggestion of contrast in the stomach  - troponin 0.01  - lactate 1.3 -> 1.2  - GI consult: high risk for endoscopy, c/w conservative management  - bowel regimen miralax, senna, tap water enema q8 prn, lactulose prn   - changed PPI to IV BID, c/w carafate 1g BID  - mesenteric doppler: Nonconclusive examination of aorta and mesenteric vessels due to presence of bowel gas  - repeat KUB 4/13: decreased stool burden  - repeat mesenteric doppler 4/14 given decreased stool burden  - repeat trop, ckmb, cmp  - tramadol for pain control    #LLE distal DVT   - Duplex (3/24/23) noted thrombus in left gastrocnemius and peroneal veins  - repeat duplex unchanged, was seen by vascular on recent admission  - was started on Eliquis given pt is not ambulating and remains on bed but pt has hx of PUD, and c/o of intermittent epigastric burn/ discomfort, would be at risk of GIB, daughter Meena Powell (at bedside) decided to hold on AC  - repeat duplex    # hyponatremia - stable  - HCTZ on hold    #CKD III  - cr at baseline  - keep off lisinopril   - was following with Nephrology in [place]  - encourage PO intake    #HTN  - was on amlodipine 2.5 mg daily, HCTZ 12.5mg daily and lisinopril 10mg daily in assisted living   - holding HCTZ and lisinopril     #DM  - takes pioglitazone 15mg daily  - c/w insulin lantus 10U at bedtime, lispro 3U TID; monitor FS  - A1c 7.4% (3/2023)    DVT pp: cont  DNR DNI

## 2023-04-16 LAB
ALBUMIN SERPL ELPH-MCNC: 2.7 G/DL — LOW (ref 3.5–5.2)
ALP SERPL-CCNC: 162 U/L — HIGH (ref 30–115)
ALT FLD-CCNC: 14 U/L — SIGNIFICANT CHANGE UP (ref 0–41)
ANION GAP SERPL CALC-SCNC: 10 MMOL/L — SIGNIFICANT CHANGE UP (ref 7–14)
AST SERPL-CCNC: 12 U/L — SIGNIFICANT CHANGE UP (ref 0–41)
BASOPHILS # BLD AUTO: 0.06 K/UL — SIGNIFICANT CHANGE UP (ref 0–0.2)
BASOPHILS NFR BLD AUTO: 0.6 % — SIGNIFICANT CHANGE UP (ref 0–1)
BILIRUB SERPL-MCNC: <0.2 MG/DL — SIGNIFICANT CHANGE UP (ref 0.2–1.2)
BUN SERPL-MCNC: 42 MG/DL — HIGH (ref 10–20)
CALCIUM SERPL-MCNC: 9.5 MG/DL — SIGNIFICANT CHANGE UP (ref 8.4–10.5)
CHLORIDE SERPL-SCNC: 99 MMOL/L — SIGNIFICANT CHANGE UP (ref 98–110)
CO2 SERPL-SCNC: 24 MMOL/L — SIGNIFICANT CHANGE UP (ref 17–32)
CREAT SERPL-MCNC: 1.3 MG/DL — SIGNIFICANT CHANGE UP (ref 0.7–1.5)
EGFR: 39 ML/MIN/1.73M2 — LOW
EOSINOPHIL # BLD AUTO: 0.1 K/UL — SIGNIFICANT CHANGE UP (ref 0–0.7)
EOSINOPHIL NFR BLD AUTO: 1.1 % — SIGNIFICANT CHANGE UP (ref 0–8)
GLUCOSE BLDC GLUCOMTR-MCNC: 148 MG/DL — HIGH (ref 70–99)
GLUCOSE BLDC GLUCOMTR-MCNC: 170 MG/DL — HIGH (ref 70–99)
GLUCOSE BLDC GLUCOMTR-MCNC: 179 MG/DL — HIGH (ref 70–99)
GLUCOSE BLDC GLUCOMTR-MCNC: 98 MG/DL — SIGNIFICANT CHANGE UP (ref 70–99)
GLUCOSE SERPL-MCNC: 122 MG/DL — HIGH (ref 70–99)
HCT VFR BLD CALC: 28.1 % — LOW (ref 37–47)
HGB BLD-MCNC: 9.1 G/DL — LOW (ref 12–16)
IMM GRANULOCYTES NFR BLD AUTO: 1.4 % — HIGH (ref 0.1–0.3)
LYMPHOCYTES # BLD AUTO: 1.83 K/UL — SIGNIFICANT CHANGE UP (ref 1.2–3.4)
LYMPHOCYTES # BLD AUTO: 19.6 % — LOW (ref 20.5–51.1)
MAGNESIUM SERPL-MCNC: 1.8 MG/DL — SIGNIFICANT CHANGE UP (ref 1.8–2.4)
MCHC RBC-ENTMCNC: 28.7 PG — SIGNIFICANT CHANGE UP (ref 27–31)
MCHC RBC-ENTMCNC: 32.4 G/DL — SIGNIFICANT CHANGE UP (ref 32–37)
MCV RBC AUTO: 88.6 FL — SIGNIFICANT CHANGE UP (ref 81–99)
MONOCYTES # BLD AUTO: 0.67 K/UL — HIGH (ref 0.1–0.6)
MONOCYTES NFR BLD AUTO: 7.2 % — SIGNIFICANT CHANGE UP (ref 1.7–9.3)
NEUTROPHILS # BLD AUTO: 6.55 K/UL — HIGH (ref 1.4–6.5)
NEUTROPHILS NFR BLD AUTO: 70.1 % — SIGNIFICANT CHANGE UP (ref 42.2–75.2)
NRBC # BLD: 0 /100 WBCS — SIGNIFICANT CHANGE UP (ref 0–0)
PLATELET # BLD AUTO: 440 K/UL — HIGH (ref 130–400)
PMV BLD: 9.6 FL — SIGNIFICANT CHANGE UP (ref 7.4–10.4)
POTASSIUM SERPL-MCNC: 5.3 MMOL/L — HIGH (ref 3.5–5)
POTASSIUM SERPL-SCNC: 5.3 MMOL/L — HIGH (ref 3.5–5)
PROT SERPL-MCNC: 5.9 G/DL — LOW (ref 6–8)
RBC # BLD: 3.17 M/UL — LOW (ref 4.2–5.4)
RBC # FLD: 16.3 % — HIGH (ref 11.5–14.5)
SODIUM SERPL-SCNC: 133 MMOL/L — LOW (ref 135–146)
WBC # BLD: 9.34 K/UL — SIGNIFICANT CHANGE UP (ref 4.8–10.8)
WBC # FLD AUTO: 9.34 K/UL — SIGNIFICANT CHANGE UP (ref 4.8–10.8)

## 2023-04-16 PROCEDURE — 99233 SBSQ HOSP IP/OBS HIGH 50: CPT

## 2023-04-16 PROCEDURE — 78582 LUNG VENTILAT&PERFUS IMAGING: CPT | Mod: 26

## 2023-04-16 RX ORDER — SOD SULF/SODIUM/NAHCO3/KCL/PEG
1000 SOLUTION, RECONSTITUTED, ORAL ORAL ONCE
Refills: 0 | Status: COMPLETED | OUTPATIENT
Start: 2023-04-16 | End: 2023-04-16

## 2023-04-16 RX ADMIN — INSULIN GLARGINE 10 UNIT(S): 100 INJECTION, SOLUTION SUBCUTANEOUS at 14:36

## 2023-04-16 RX ADMIN — ENOXAPARIN SODIUM 50 MILLIGRAM(S): 100 INJECTION SUBCUTANEOUS at 05:58

## 2023-04-16 RX ADMIN — Medication 3 MILLILITER(S): at 19:45

## 2023-04-16 RX ADMIN — ENOXAPARIN SODIUM 50 MILLIGRAM(S): 100 INJECTION SUBCUTANEOUS at 18:01

## 2023-04-16 RX ADMIN — BUDESONIDE AND FORMOTEROL FUMARATE DIHYDRATE 2 PUFF(S): 160; 4.5 AEROSOL RESPIRATORY (INHALATION) at 20:24

## 2023-04-16 RX ADMIN — Medication 1: at 18:05

## 2023-04-16 RX ADMIN — FAMOTIDINE 40 MILLIGRAM(S): 10 INJECTION INTRAVENOUS at 18:01

## 2023-04-16 RX ADMIN — POLYETHYLENE GLYCOL 3350 17 GRAM(S): 17 POWDER, FOR SOLUTION ORAL at 14:36

## 2023-04-16 RX ADMIN — BUDESONIDE AND FORMOTEROL FUMARATE DIHYDRATE 2 PUFF(S): 160; 4.5 AEROSOL RESPIRATORY (INHALATION) at 14:33

## 2023-04-16 RX ADMIN — SENNA PLUS 2 TABLET(S): 8.6 TABLET ORAL at 05:57

## 2023-04-16 RX ADMIN — Medication 3 UNIT(S): at 14:30

## 2023-04-16 RX ADMIN — Medication 3 MILLILITER(S): at 08:10

## 2023-04-16 RX ADMIN — Medication 1 GRAM(S): at 05:58

## 2023-04-16 RX ADMIN — AMLODIPINE BESYLATE 2.5 MILLIGRAM(S): 2.5 TABLET ORAL at 05:58

## 2023-04-16 RX ADMIN — CHLORHEXIDINE GLUCONATE 1 APPLICATION(S): 213 SOLUTION TOPICAL at 05:59

## 2023-04-16 RX ADMIN — PANTOPRAZOLE SODIUM 40 MILLIGRAM(S): 20 TABLET, DELAYED RELEASE ORAL at 18:00

## 2023-04-16 RX ADMIN — Medication 3 UNIT(S): at 18:05

## 2023-04-16 RX ADMIN — Medication 100 MILLIGRAM(S): at 05:59

## 2023-04-16 RX ADMIN — Medication 1 GRAM(S): at 18:00

## 2023-04-16 RX ADMIN — PANTOPRAZOLE SODIUM 40 MILLIGRAM(S): 20 TABLET, DELAYED RELEASE ORAL at 05:57

## 2023-04-16 RX ADMIN — POLYETHYLENE GLYCOL 3350 17 GRAM(S): 17 POWDER, FOR SOLUTION ORAL at 21:27

## 2023-04-16 RX ADMIN — Medication 1 MILLIGRAM(S): at 12:02

## 2023-04-16 RX ADMIN — FAMOTIDINE 40 MILLIGRAM(S): 10 INJECTION INTRAVENOUS at 05:57

## 2023-04-16 RX ADMIN — ATORVASTATIN CALCIUM 20 MILLIGRAM(S): 80 TABLET, FILM COATED ORAL at 21:26

## 2023-04-16 RX ADMIN — POLYETHYLENE GLYCOL 3350 17 GRAM(S): 17 POWDER, FOR SOLUTION ORAL at 05:58

## 2023-04-16 RX ADMIN — Medication 5 MILLIGRAM(S): at 21:26

## 2023-04-16 RX ADMIN — Medication 0: at 08:26

## 2023-04-16 RX ADMIN — TRAMADOL HYDROCHLORIDE 25 MILLIGRAM(S): 50 TABLET ORAL at 21:24

## 2023-04-16 RX ADMIN — SENNA PLUS 2 TABLET(S): 8.6 TABLET ORAL at 18:00

## 2023-04-16 RX ADMIN — TRAMADOL HYDROCHLORIDE 25 MILLIGRAM(S): 50 TABLET ORAL at 21:54

## 2023-04-16 RX ADMIN — Medication 3 UNIT(S): at 08:26

## 2023-04-16 NOTE — PROGRESS NOTE ADULT - ASSESSMENT
· Assessment	  89 year old female with PMHx of HTN, DM, CKD, active smoker, COPD not on home O2 recent admission for aspiration PNA with incidental finding of distal DVT presented to the ED for hypoxia    #Acute Hypoxic respiratory failure / multifactorial dyspnea/ suspected PNA/ h/o diastolic CHF/ MS / h/o COPD in active smoker / h/o distal DVT ( 2 months ago)   - CT angio: no PE but B/L lower lobe and R middle lobe consolidations   - recent V/Q scan has low probability of PE   - RVP, strep, legionella, bcx and MRSA negative   - procal 0.14  - s/p 7d course of cefepime and levaquin, completed 4/9, no more Abx recommended by ID   - prednisone taper complete on 4/12  - start fluid restriction 1200 ml in 24 hours   - intake and output monitoring daily weight   - incentive spirometry   - supportive care   - maintain fluid balance  - monitor pulse Ox, taper off oxygen if tolerated     # Dyspepsia/ Peptic Ulcer Disease/Hiatal hernia/ constipation   - GI consult: high risk for endoscopy, c/w conservative management  - bowel regimen miralax, senna, tap water enema q8 prn, lactulose prn   - d PPI  IV BID, c/w carafate 1g BID  - mesenteric doppler is negative for stenosis   - tramadol for pain control  - will give Golytely, 1L for severe constipation     #LLE distal DVT   - Duplex (3/24/23) noted thrombus in left gastrocnemius and peroneal veins  - repeat duplex unchanged, was seen by vascular on recent admission  - was started on Eliquis given pt is not ambulating and remains on bed but pt has hx of PUD, and c/o of intermittent epigastric burn/ discomfort, would be at risk of GIB, daughter Meena Powell (at bedside) decided to hold on AC  - repeat duplex is negative for DVT     # hyponatremia   - stable  - HCTZ on hold    #CKD III  - cr at baseline  - keep off lisinopril   - maintain fluid balance     #HTN  - c/w  amlodipine 2.5 mg daily  - holding HCTZ and lisinopril     #DM  - takes pioglitazone 15mg daily  - c/w insulin lantus 10U at bedtime, lispro 3U TID; monitor FS  - A1c 7.4% (3/2023)    DVT pp: cont  DNR DNI, consulted by hospice       #Progress Note Handoff  Pending (specify):  give Golytely 1 L only for severe constipation fecal impaction, c/w bowel regimen, supportive care, tale pt OOB to chair, will start PT in 24 hours, CHF protocol, maintain fluid balance    Family discussion: I spoke with pt and her daughters at the bedside at length today, they agreed with a plan of care   Disposition: Home___/SNF___/Other________/Unknown at this time___x _____

## 2023-04-16 NOTE — PROGRESS NOTE ADULT - SUBJECTIVE AND OBJECTIVE BOX
89 year old female with PMHx of HTN, DM, CKD, active smoker, COPD not on home O2 recent admission for aspiration PNA with incidental finding of distal DVT presented to the ED for hypoxia.  In last few day pt was c/o abdominal pain due to constipation / fecal impaction, mesenteric Doppler and V/Q rod were negative.   I spoke with pt and her family at the bedside today extensively.   Pt was conformable not in pain, asking for food, awake and alert, frail female with hearing loss.       REVIEW OF SYSTEMS:  Negative except as noted above.     VITALS:  T(C): 36.7 (16 Apr 2023 13:22), Max: 36.7 (16 Apr 2023 13:22)  T(F): 98.1 (16 Apr 2023 13:22), Max: 98.1 (16 Apr 2023 13:22)  HR: 114 (16 Apr 2023 13:22) (111 - 129)  BP: 124/58 (16 Apr 2023 13:22) (117/58 - 151/72)  BP(mean): --  RR: 18 (16 Apr 2023 13:22) (18 - 18)  SpO2: 96% (16 Apr 2023 13:22) (87% - 96%)    Parameters below as of 15 Apr 2023 20:00  Patient On (Oxygen Delivery Method): nasal cannula  O2 Flow (L/min): 2.5      HEIGHT/WEIGHT/BMI:   Height (cm): 152.4 (04-03), 155 (03-23)  Weight (kg): 47.6 (04-03), 43.1 (03-22)  BMI (kg/m2): 20.5 (04-03), 17.9 (03-23)    PHYSICAL EXAM:   GENERAL: NAD, lying in bed comfortably, underweight , frail female with temporal muscle waisting   NERVOUS SYSTEM:  Alert & Oriented X3, speech clear. No deficits, hard of hearing    HEAD:  Atraumatic, Normocephalic  EYES: EOMI, conjunctiva and sclera clear  ENT: Moist mucous membranes  NECK: Supple, No JVD  CHEST/LUNG: decreased BS at bases   HEART: Regular rate and rhythm  ABDOMEN: Bowel sounds present; Soft, Nontender, Nondistended  EXTREMITIES: No clubbing, cyanosis, or edema  MSK: FROM all 4 extremities, full and equal strength  SKIN: No rashes or lesions      LABS:                                    9.1    9.34  )-----------( 440      ( 16 Apr 2023 07:49 )             28.1   04-16    133<L>  |  99  |  42<H>  ----------------------------<  122<H>  5.3<H>   |  24  |  1.3    Ca    9.5      16 Apr 2023 07:49  Mg     1.8     04-16    TPro  5.9<L>  /  Alb  2.7<L>  /  TBili  <0.2  /  DBili  x   /  AST  12  /  ALT  14  /  AlkPhos  162<H>  04-16    Culture - Blood (04.03.23 @ 05:55)   Specimen Source: .Blood Blood-Venous  Culture Results:   No Growth FinalCulture - Urine (04.03.23 @ 05:15)   Specimen Source: Clean Catch Clean Catch (Midstream)  Culture Results:   >=3 organisms. Probable collection contamination.    DIET:   Diet, Soft and Bite Sized:   Consistent Carbohydrate No Snacks  DASH/TLC Sodium & Cholesterol Restricted (04-03-23 @ 08:44) [Active]        RADIOLOGY:  < from: NM Pulmonary Ventilation/Perfusion Scan (04.16.23 @ 11:58) >    IMPRESSION:    LOW PROBABILITY FOR PULMONARY EMBOLISM.    SEGMENTAL IN CHARACTER PERFUSION DEFECTS IN THE BILATERAL LOWER LOBES   COMPLETELY MATCHED BY VENTILATION SCAN ABNORMALITIES.    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    < end of copied text >  < from: Xray Chest 1 View-PORTABLE IMMEDIATE (Xray Chest 1 View-PORTABLE IMMEDIATE .) (04.14.23 @ 12:06) >  Impression:    Bilateral opacities, overall unchanged.    < end of copied text >  < from: Xray Kidney Ureter Bladder (04.11.23 @ 06:28) >    Findings/  impression:    Copious stool seen throughout the colon. Nonobstructive bowel gas   pattern. Degenerative changes. Suggestion of contrast within the stomach.    < end of copied text >  < from: TTE Echo Complete w/o Contrast w/ Doppler (04.07.23 @ 10:17) >  Summary:   1. Technically difficult study.   2. Normal left ventricular internal cavity size. Normal global left   ventricular systolic function. LV Ejection Fraction by Araujo's Method   with a biplane EF of 55 %. Grade I diastolic dysfunction.   3. Normal right ventricular size and function.   4.Moderate mitral stenosis. Mild mitral valve regurgitation.   5. Mild aortic valve stenosis.    MEDICATIONS  (STANDING):  albuterol/ipratropium for Nebulization 3 milliLiter(s) Nebulizer every 12 hours  amLODIPine   Tablet 2.5 milliGRAM(s) Oral daily  atorvastatin 20 milliGRAM(s) Oral at bedtime  budesonide  80 MICROgram(s)/formoterol 4.5 MICROgram(s) Inhaler 2 Puff(s) Inhalation two times a day  chlorhexidine 2% Cloths 1 Application(s) Topical <User Schedule>  dextrose 5%. 1000 milliLiter(s) (100 mL/Hr) IV Continuous <Continuous>  dextrose 5%. 1000 milliLiter(s) (50 mL/Hr) IV Continuous <Continuous>  dextrose 50% Injectable 25 Gram(s) IV Push once  dextrose 50% Injectable 25 Gram(s) IV Push once  dextrose 50% Injectable 12.5 Gram(s) IV Push once  enoxaparin Injectable 50 milliGRAM(s) SubCutaneous every 12 hours  famotidine    Tablet 40 milliGRAM(s) Oral two times a day  folic acid 1 milliGRAM(s) Oral daily  glucagon  Injectable 1 milliGRAM(s) IntraMuscular once  insulin glargine Injectable (LANTUS) 10 Unit(s) SubCutaneous every morning  insulin lispro (ADMELOG) corrective regimen sliding scale   SubCutaneous three times a day before meals  insulin lispro (ADMELOG) corrective regimen sliding scale   SubCutaneous at bedtime  insulin lispro Injectable (ADMELOG) 3 Unit(s) SubCutaneous three times a day before meals  melatonin 5 milliGRAM(s) Oral at bedtime  pantoprazole    Tablet 40 milliGRAM(s) Oral two times a day  polyethylene glycol 3350 17 Gram(s) Oral <User Schedule>  polyethylene glycol/electrolyte Solution. 1000 milliLiter(s) Oral once  senna 2 Tablet(s) Oral every 12 hours  sodium zirconium cyclosilicate 10 Gram(s) Oral once  sucralfate 1 Gram(s) Oral two times a day    MEDICATIONS  (PRN):  acetaminophen     Tablet .. 650 milliGRAM(s) Oral every 6 hours PRN Moderate Pain (4 - 6)  albuterol/ipratropium for Nebulization 3 milliLiter(s) Nebulizer every 4 hours PRN Shortness of Breath and/or Wheezing  aluminum hydroxide/magnesium hydroxide/simethicone Suspension 30 milliLiter(s) Oral every 4 hours PRN Dyspepsia  dextrose Oral Gel 15 Gram(s) Oral once PRN Blood Glucose LESS THAN 70 milliGRAM(s)/deciliter  lactulose Syrup 10 Gram(s) Oral every 12 hours PRN constipation  traMADol 25 milliGRAM(s) Oral every 8 hours PRN Severe Pain (7 - 10)

## 2023-04-17 LAB
ALBUMIN SERPL ELPH-MCNC: 2.8 G/DL — LOW (ref 3.5–5.2)
ALP SERPL-CCNC: 174 U/L — HIGH (ref 30–115)
ALT FLD-CCNC: 13 U/L — SIGNIFICANT CHANGE UP (ref 0–41)
ANION GAP SERPL CALC-SCNC: 12 MMOL/L — SIGNIFICANT CHANGE UP (ref 7–14)
ANION GAP SERPL CALC-SCNC: 9 MMOL/L — SIGNIFICANT CHANGE UP (ref 7–14)
AST SERPL-CCNC: 12 U/L — SIGNIFICANT CHANGE UP (ref 0–41)
BASOPHILS # BLD AUTO: 0.05 K/UL — SIGNIFICANT CHANGE UP (ref 0–0.2)
BASOPHILS NFR BLD AUTO: 0.6 % — SIGNIFICANT CHANGE UP (ref 0–1)
BILIRUB SERPL-MCNC: 0.3 MG/DL — SIGNIFICANT CHANGE UP (ref 0.2–1.2)
BUN SERPL-MCNC: 41 MG/DL — HIGH (ref 10–20)
BUN SERPL-MCNC: 42 MG/DL — HIGH (ref 10–20)
CALCIUM SERPL-MCNC: 9.5 MG/DL — SIGNIFICANT CHANGE UP (ref 8.4–10.4)
CALCIUM SERPL-MCNC: 9.6 MG/DL — SIGNIFICANT CHANGE UP (ref 8.4–10.4)
CHLORIDE SERPL-SCNC: 101 MMOL/L — SIGNIFICANT CHANGE UP (ref 98–110)
CHLORIDE SERPL-SCNC: 98 MMOL/L — SIGNIFICANT CHANGE UP (ref 98–110)
CO2 SERPL-SCNC: 22 MMOL/L — SIGNIFICANT CHANGE UP (ref 17–32)
CO2 SERPL-SCNC: 24 MMOL/L — SIGNIFICANT CHANGE UP (ref 17–32)
CREAT SERPL-MCNC: 1.2 MG/DL — SIGNIFICANT CHANGE UP (ref 0.7–1.5)
CREAT SERPL-MCNC: 1.3 MG/DL — SIGNIFICANT CHANGE UP (ref 0.7–1.5)
EGFR: 39 ML/MIN/1.73M2 — LOW
EGFR: 43 ML/MIN/1.73M2 — LOW
EOSINOPHIL # BLD AUTO: 0.11 K/UL — SIGNIFICANT CHANGE UP (ref 0–0.7)
EOSINOPHIL NFR BLD AUTO: 1.4 % — SIGNIFICANT CHANGE UP (ref 0–8)
GLUCOSE BLDC GLUCOMTR-MCNC: 109 MG/DL — HIGH (ref 70–99)
GLUCOSE BLDC GLUCOMTR-MCNC: 150 MG/DL — HIGH (ref 70–99)
GLUCOSE BLDC GLUCOMTR-MCNC: 164 MG/DL — HIGH (ref 70–99)
GLUCOSE BLDC GLUCOMTR-MCNC: 185 MG/DL — HIGH (ref 70–99)
GLUCOSE BLDC GLUCOMTR-MCNC: 193 MG/DL — HIGH (ref 70–99)
GLUCOSE SERPL-MCNC: 108 MG/DL — HIGH (ref 70–99)
GLUCOSE SERPL-MCNC: 117 MG/DL — HIGH (ref 70–99)
HCT VFR BLD CALC: 28.6 % — LOW (ref 37–47)
HGB BLD-MCNC: 9 G/DL — LOW (ref 12–16)
IMM GRANULOCYTES NFR BLD AUTO: 1.4 % — HIGH (ref 0.1–0.3)
LYMPHOCYTES # BLD AUTO: 1.82 K/UL — SIGNIFICANT CHANGE UP (ref 1.2–3.4)
LYMPHOCYTES # BLD AUTO: 22.8 % — SIGNIFICANT CHANGE UP (ref 20.5–51.1)
MAGNESIUM SERPL-MCNC: 1.9 MG/DL — SIGNIFICANT CHANGE UP (ref 1.8–2.4)
MCHC RBC-ENTMCNC: 27.7 PG — SIGNIFICANT CHANGE UP (ref 27–31)
MCHC RBC-ENTMCNC: 31.5 G/DL — LOW (ref 32–37)
MCV RBC AUTO: 88 FL — SIGNIFICANT CHANGE UP (ref 81–99)
MONOCYTES # BLD AUTO: 0.71 K/UL — HIGH (ref 0.1–0.6)
MONOCYTES NFR BLD AUTO: 8.9 % — SIGNIFICANT CHANGE UP (ref 1.7–9.3)
NEUTROPHILS # BLD AUTO: 5.2 K/UL — SIGNIFICANT CHANGE UP (ref 1.4–6.5)
NEUTROPHILS NFR BLD AUTO: 64.9 % — SIGNIFICANT CHANGE UP (ref 42.2–75.2)
NRBC # BLD: 0 /100 WBCS — SIGNIFICANT CHANGE UP (ref 0–0)
PLATELET # BLD AUTO: 422 K/UL — HIGH (ref 130–400)
PMV BLD: 10.3 FL — SIGNIFICANT CHANGE UP (ref 7.4–10.4)
POTASSIUM SERPL-MCNC: 5.1 MMOL/L — HIGH (ref 3.5–5)
POTASSIUM SERPL-MCNC: 5.4 MMOL/L — HIGH (ref 3.5–5)
POTASSIUM SERPL-SCNC: 5.1 MMOL/L — HIGH (ref 3.5–5)
POTASSIUM SERPL-SCNC: 5.4 MMOL/L — HIGH (ref 3.5–5)
PROT SERPL-MCNC: 6.2 G/DL — SIGNIFICANT CHANGE UP (ref 6–8)
RBC # BLD: 3.25 M/UL — LOW (ref 4.2–5.4)
RBC # FLD: 16.4 % — HIGH (ref 11.5–14.5)
SARS-COV-2 RNA SPEC QL NAA+PROBE: SIGNIFICANT CHANGE UP
SODIUM SERPL-SCNC: 132 MMOL/L — LOW (ref 135–146)
SODIUM SERPL-SCNC: 134 MMOL/L — LOW (ref 135–146)
WBC # BLD: 8 K/UL — SIGNIFICANT CHANGE UP (ref 4.8–10.8)
WBC # FLD AUTO: 8 K/UL — SIGNIFICANT CHANGE UP (ref 4.8–10.8)

## 2023-04-17 PROCEDURE — 99233 SBSQ HOSP IP/OBS HIGH 50: CPT

## 2023-04-17 RX ORDER — SENNA PLUS 8.6 MG/1
2 TABLET ORAL
Qty: 0 | Refills: 0 | DISCHARGE
Start: 2023-04-17

## 2023-04-17 RX ORDER — POLYETHYLENE GLYCOL 3350 17 G/17G
17 POWDER, FOR SOLUTION ORAL
Qty: 0 | Refills: 0 | DISCHARGE
Start: 2023-04-17

## 2023-04-17 RX ORDER — FOLIC ACID 0.8 MG
1 TABLET ORAL
Qty: 0 | Refills: 0 | DISCHARGE
Start: 2023-04-17

## 2023-04-17 RX ORDER — IPRATROPIUM/ALBUTEROL SULFATE 18-103MCG
3 AEROSOL WITH ADAPTER (GRAM) INHALATION
Qty: 0 | Refills: 0 | DISCHARGE
Start: 2023-04-17

## 2023-04-17 RX ORDER — HEPARIN SODIUM 5000 [USP'U]/ML
5000 INJECTION INTRAVENOUS; SUBCUTANEOUS EVERY 12 HOURS
Refills: 0 | Status: DISCONTINUED | OUTPATIENT
Start: 2023-04-17 | End: 2023-04-19

## 2023-04-17 RX ADMIN — AMLODIPINE BESYLATE 2.5 MILLIGRAM(S): 2.5 TABLET ORAL at 05:31

## 2023-04-17 RX ADMIN — Medication 3 UNIT(S): at 11:36

## 2023-04-17 RX ADMIN — FAMOTIDINE 40 MILLIGRAM(S): 10 INJECTION INTRAVENOUS at 05:30

## 2023-04-17 RX ADMIN — Medication 3 MILLILITER(S): at 08:47

## 2023-04-17 RX ADMIN — Medication 1 GRAM(S): at 18:24

## 2023-04-17 RX ADMIN — Medication 1: at 18:30

## 2023-04-17 RX ADMIN — Medication 3 UNIT(S): at 08:01

## 2023-04-17 RX ADMIN — SENNA PLUS 2 TABLET(S): 8.6 TABLET ORAL at 05:30

## 2023-04-17 RX ADMIN — Medication 1 GRAM(S): at 05:30

## 2023-04-17 RX ADMIN — BUDESONIDE AND FORMOTEROL FUMARATE DIHYDRATE 2 PUFF(S): 160; 4.5 AEROSOL RESPIRATORY (INHALATION) at 21:39

## 2023-04-17 RX ADMIN — SENNA PLUS 2 TABLET(S): 8.6 TABLET ORAL at 18:24

## 2023-04-17 RX ADMIN — TRAMADOL HYDROCHLORIDE 25 MILLIGRAM(S): 50 TABLET ORAL at 18:41

## 2023-04-17 RX ADMIN — PANTOPRAZOLE SODIUM 40 MILLIGRAM(S): 20 TABLET, DELAYED RELEASE ORAL at 18:24

## 2023-04-17 RX ADMIN — Medication 3 UNIT(S): at 18:30

## 2023-04-17 RX ADMIN — INSULIN GLARGINE 10 UNIT(S): 100 INJECTION, SOLUTION SUBCUTANEOUS at 09:22

## 2023-04-17 RX ADMIN — ATORVASTATIN CALCIUM 20 MILLIGRAM(S): 80 TABLET, FILM COATED ORAL at 21:40

## 2023-04-17 RX ADMIN — ENOXAPARIN SODIUM 50 MILLIGRAM(S): 100 INJECTION SUBCUTANEOUS at 05:30

## 2023-04-17 RX ADMIN — Medication 5 MILLIGRAM(S): at 21:40

## 2023-04-17 RX ADMIN — Medication 1: at 11:36

## 2023-04-17 RX ADMIN — CHLORHEXIDINE GLUCONATE 1 APPLICATION(S): 213 SOLUTION TOPICAL at 05:29

## 2023-04-17 RX ADMIN — Medication 1 MILLIGRAM(S): at 11:36

## 2023-04-17 RX ADMIN — PANTOPRAZOLE SODIUM 40 MILLIGRAM(S): 20 TABLET, DELAYED RELEASE ORAL at 05:31

## 2023-04-17 NOTE — PROGRESS NOTE ADULT - ASSESSMENT
DNR DNI 89 y/o woman w PMHx of HTN, HLD, DM, HFpEF, mitral stenosis, COPD, PUD, CKD3b w baseline Cr ~1.4, prior distal LLE DVT, w AC held 2/2 h/o PUD, recent admission for aspiration PNA and GERD (EGD not done 2/2 high risk), d/c on cefpodoxime, referred from [facility] for CP, SOB, hypoxia w SaO2 85% on RA, admitted for hypoxic respiratory failure.   While admitted, was treated w 7d course Cefepime/Levaquin completed 4/9/23 and completed prednisone taper 4/12/23. Noted to have abd pain 2/2 constipation/fecal impaction w mesenteric doppler and VQ scan neg.     #Acute Hypoxic respiratory failure / multifactorial dyspnea  #suspected PNA - treated   #diastolic CHF/ MS   #COPD in active smoker   - CT angio: no PE but B/L lower lobe and R middle lobe consolidations, recent V/Q scan has low probability of PE   - RVP, strep, legionella, bcx and MRSA negative, procal 0.14  - s/p 7d course of cefepime and levaquin, completed 4/9, no more Abx recommended by ID   - prednisone taper complete on 4/12  - start fluid restriction 1200 ml in 24 hours   - I&O monitoring, daily weight, incentive spirometry, maintain fluid balance  - supportive care, monitor pulse Ox, taper off oxygen if tolerated w SpO2 goal 88-92%     # Dyspepsia/ Peptic Ulcer Disease/Hiatal hernia/ constipation   - GI consult: high risk for endoscopy, c/w conservative management   - Cont PPI IV BID, c/w carafate 1g BID  - mesenteric doppler is negative for stenosis   - tramadol for pain control  - bowel regimen miralax, senna, tap water enema q8 prn, lactulose prn  - will give Golytely, 1L for severe constipation     #LLE distal DVT in 2/2023 - resolved   - Duplex (3/24/23) noted thrombus in left gastrocnemius and peroneal veins  - repeat duplex unchanged, was seen by vascular on recent admission  - was started on Eliquis given pt is not ambulating and remains on bed but pt has hx of PUD, and c/o of intermittent epigastric burn/ discomfort, would be at risk of GIB, daughter Meena Powell (at bedside) decided to hold on AC  - repeat duplex 4/14/23 is negative for DVT     # hyponatremia   - stable  - HCTZ on hold    #CKD3b   - Cr baseline ~1.4, currently at baseline  - holding lisinopril     #HTN  - c/w  amlodipine 2.5 mg daily  - holding HCTZ and lisinopril     #DM  - takes pioglitazone 15mg daily, A1c 7.4% (3/2023)  - c/w insulin lantus 10U at bedtime, lispro 3U TID; monitor FS    DVT pp: cont  DNR DNI, consulted by hospice     #HANDOFF  - Golytely 1L for constipation   - Fluid restrict   - Start PT   - Hospice f/u  DNR DNI 89 y/o woman w PMHx of HTN, HLD, DM, HFpEF, mitral stenosis, COPD, PUD, CKD3b w baseline Cr ~1.4, prior distal LLE DVT, w AC held 2/2 h/o PUD, recent admission for aspiration PNA and GERD (EGD not done 2/2 high risk), d/c on cefpodoxime, referred from [facility] for CP, SOB, hypoxia w SaO2 85% on RA, admitted for hypoxic respiratory failure.   While admitted, was treated w 7d course Cefepime/Levaquin completed 4/9/23 and completed prednisone taper 4/12/23. Noted to have abd pain 2/2 constipation/fecal impaction w mesenteric doppler and VQ scan neg.     #Acute Hypoxic respiratory failure / multifactorial dyspnea  #suspected PNA - treated   #diastolic CHF/ MS   #COPD in active smoker   - CT angio: no PE but B/L lower lobe and R middle lobe consolidations, recent V/Q scan has low probability of PE   - RVP, strep, legionella, bcx and MRSA negative, procal 0.14  - s/p 7d course of cefepime and levaquin, completed 4/9, no more Abx recommended by ID   - prednisone taper complete on 4/12/23  - start fluid restriction 1200 ml in 24 hours   - I&O monitoring, daily weight, incentive spirometry, maintain fluid balance  - supportive care, monitor pulse Ox, taper off oxygen if tolerated w SpO2 goal 88-92%     # Dyspepsia/ Peptic Ulcer Disease/Hiatal hernia  # Constipation - resolved   - GI consult: high risk for endoscopy, c/w conservative management   - Cont PPI IV BID, c/w carafate 1g BID  - mesenteric doppler is negative for stenosis   - tramadol for pain control  - bowel regimen miralax, senna, tap water enema q8 prn, lactulose prn  - Received Golytely 1L for severe constipation - 4/17/23 passing BMs     #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   - HCTZ on hold  - 4/17/23 d/c salt restriction on diet     #CKD3b   - Cr baseline ~1.4, currently at baseline  - holding lisinopril     #HTN  - c/w amlodipine 2.5 mg daily  - holding HCTZ and lisinopril     #DM  - takes pioglitazone 15mg daily, A1c 7.4% (3/2023)  - c/w insulin lantus 10U at bedtime, lispro 3U TID; monitor FS                                                                              ----------------------------------------------------  # DVT prophylaxis: Lovenox 50mg q12h   # GI prophylaxis: Pantoprazole 40mg BID   # Diet: Soft bite sized CC Low fat 1200cc +glucerna  # Activity: IAT   # Code status: DNR DNI   # Disposition: DC w hospice to [facility]                                                                            --------------------------------------------------------  #HANDOFF  - Follow hyponatremia, Hb   - DC w hospice to [facility]

## 2023-04-17 NOTE — PROGRESS NOTE ADULT - SUBJECTIVE AND OBJECTIVE BOX
89 year old female with PMHx of HTN, DM, CKD, active smoker, COPD not on home O2 recent admission for aspiration PNA with incidental finding of distal DVT presented to the ED for hypoxia.  In last few day pt was c/o abdominal pain due to constipation / fecal impaction, mesenteric Doppler and V/Q rod were negative.   Today pt is c/o abdominal pain today ( has multiple BMs), likely due to spams, awake and alert.       REVIEW OF SYSTEMS:  Negative except as noted above.     VITALS:  T(C): 36.7 (17 Apr 2023 14:00), Max: 37.1 (16 Apr 2023 21:14)  T(F): 98.1 (17 Apr 2023 14:00), Max: 98.8 (16 Apr 2023 21:14)  HR: 99 (17 Apr 2023 14:00) (62 - 120)  BP: 136/70 (17 Apr 2023 14:00) (134/74 - 149/73)  BP(mean): --  RR: 18 (17 Apr 2023 14:00) (18 - 19)  SpO2: 98% (17 Apr 2023 14:00) (97% - 98%)    Parameters below as of 16 Apr 2023 21:14  Patient On (Oxygen Delivery Method): nasal cannula  O2 Flow (L/min): 2      HEIGHT/WEIGHT/BMI:   Height (cm): 152.4 (04-03), 155 (03-23)  Weight (kg): 47.6 (04-03), 43.1 (03-22)  BMI (kg/m2): 20.5 (04-03), 17.9 (03-23)    PHYSICAL EXAM:   GENERAL: NAD, lying in bed comfortably, underweight , frail female with temporal muscle waisting   NERVOUS SYSTEM:  Alert & Oriented X3, speech clear. No deficits, hard of hearing    HEAD:  Atraumatic, Normocephalic  EYES: EOMI, conjunctiva and sclera clear  ENT: Moist mucous membranes  NECK: Supple, No JVD  CHEST/LUNG: decreased BS at bases   HEART: Regular rate and rhythm  ABDOMEN: Bowel sounds present; Soft, diffuse tenderness on deep palpation, no rebound, no guarding,  Nondistended  EXTREMITIES: No clubbing, cyanosis, or edema  MSK: FROM all 4 extremities, full and equal strength  SKIN: No rashes or lesions      LABS:                                    9.0    8.00  )-----------( 422      ( 17 Apr 2023 08:11 )             28.6   04-17    134<L>  |  101  |  41<H>  ----------------------------<  108<H>  5.4<H>   |  24  |  1.2    Ca    9.5      17 Apr 2023 08:11  Mg     1.9     04-17    TPro  6.2  /  Alb  2.8<L>  /  TBili  0.3  /  DBili  x   /  AST  12  /  ALT  13  /  AlkPhos  174<H>  04-17    Culture - Blood (04.03.23 @ 05:55)   Specimen Source: .Blood Blood-Venous  Culture Results:   No Growth FinalCulture - Urine (04.03.23 @ 05:15)   Specimen Source: Clean Catch Clean Catch (Midstream)  Culture Results:   >=3 organisms. Probable collection contamination.    DIET:   Diet, Soft and Bite Sized:   Consistent Carbohydrate No Snacks  DASH/TLC Sodium & Cholesterol Restricted (04-03-23 @ 08:44) [Active]        RADIOLOGY:  < from: NM Pulmonary Ventilation/Perfusion Scan (04.16.23 @ 11:58) >    IMPRESSION:    LOW PROBABILITY FOR PULMONARY EMBOLISM.    SEGMENTAL IN CHARACTER PERFUSION DEFECTS IN THE BILATERAL LOWER LOBES   COMPLETELY MATCHED BY VENTILATION SCAN ABNORMALITIES.    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    < end of copied text >  < from: Xray Chest 1 View-PORTABLE IMMEDIATE (Xray Chest 1 View-PORTABLE IMMEDIATE .) (04.14.23 @ 12:06) >  Impression:    Bilateral opacities, overall unchanged.    < end of copied text >  < from: Xray Kidney Ureter Bladder (04.11.23 @ 06:28) >    Findings/  impression:    Copious stool seen throughout the colon. Nonobstructive bowel gas   pattern. Degenerative changes. Suggestion of contrast within the stomach.    < end of copied text >  < from: TTE Echo Complete w/o Contrast w/ Doppler (04.07.23 @ 10:17) >  Summary:   1. Technically difficult study.   2. Normal left ventricular internal cavity size. Normal global left   ventricular systolic function. LV Ejection Fraction by Araujo's Method   with a biplane EF of 55 %. Grade I diastolic dysfunction.   3. Normal right ventricular size and function.   4.Moderate mitral stenosis. Mild mitral valve regurgitation.   5. Mild aortic valve stenosis.    MEDICATIONS  (STANDING):  albuterol/ipratropium for Nebulization 3 milliLiter(s) Nebulizer every 12 hours  amLODIPine   Tablet 2.5 milliGRAM(s) Oral daily  atorvastatin 20 milliGRAM(s) Oral at bedtime  budesonide  80 MICROgram(s)/formoterol 4.5 MICROgram(s) Inhaler 2 Puff(s) Inhalation two times a day  chlorhexidine 2% Cloths 1 Application(s) Topical <User Schedule>  dextrose 5%. 1000 milliLiter(s) (50 mL/Hr) IV Continuous <Continuous>  dextrose 5%. 1000 milliLiter(s) (100 mL/Hr) IV Continuous <Continuous>  dextrose 50% Injectable 12.5 Gram(s) IV Push once  dextrose 50% Injectable 25 Gram(s) IV Push once  dextrose 50% Injectable 25 Gram(s) IV Push once  enoxaparin Injectable 50 milliGRAM(s) SubCutaneous every 12 hours  famotidine    Tablet 40 milliGRAM(s) Oral two times a day  folic acid 1 milliGRAM(s) Oral daily  glucagon  Injectable 1 milliGRAM(s) IntraMuscular once  insulin glargine Injectable (LANTUS) 10 Unit(s) SubCutaneous every morning  insulin lispro (ADMELOG) corrective regimen sliding scale   SubCutaneous three times a day before meals  insulin lispro (ADMELOG) corrective regimen sliding scale   SubCutaneous at bedtime  insulin lispro Injectable (ADMELOG) 3 Unit(s) SubCutaneous three times a day before meals  melatonin 5 milliGRAM(s) Oral at bedtime  pantoprazole    Tablet 40 milliGRAM(s) Oral two times a day  polyethylene glycol 3350 17 Gram(s) Oral <User Schedule>  senna 2 Tablet(s) Oral every 12 hours  sodium zirconium cyclosilicate 10 Gram(s) Oral once  sucralfate 1 Gram(s) Oral two times a day    MEDICATIONS  (PRN):  acetaminophen     Tablet .. 650 milliGRAM(s) Oral every 6 hours PRN Moderate Pain (4 - 6)  albuterol/ipratropium for Nebulization 3 milliLiter(s) Nebulizer every 4 hours PRN Shortness of Breath and/or Wheezing  aluminum hydroxide/magnesium hydroxide/simethicone Suspension 30 milliLiter(s) Oral every 4 hours PRN Dyspepsia  dextrose Oral Gel 15 Gram(s) Oral once PRN Blood Glucose LESS THAN 70 milliGRAM(s)/deciliter  lactulose Syrup 10 Gram(s) Oral every 12 hours PRN constipation  traMADol 25 milliGRAM(s) Oral every 8 hours PRN Severe Pain (7 - 10)

## 2023-04-17 NOTE — PROGRESS NOTE ADULT - ASSESSMENT
· Assessment	  89 year old female with PMHx of HTN, DM, CKD, active smoker, COPD not on home O2 recent admission for aspiration PNA with incidental finding of distal DVT presented to the ED for hypoxia    #Acute Hypoxic respiratory failure / multifactorial dyspnea/ suspected PNA/ h/o diastolic CHF/ MS / h/o COPD in active smoker / h/o distal DVT ( 2 months ago)   - CT angio: no PE but B/L lower lobe and R middle lobe consolidations   - recent V/Q scan has low probability of PE   - RVP, strep, legionella, bcx and MRSA negative   - procal 0.14  - s/p 7d course of cefepime and levaquin, completed 4/9, no more Abx recommended by ID   - prednisone taper complete on 4/12  - fluid restriction 1200 ml in 24 hours   - intake and output monitoring daily weight   - incentive spirometry   - supportive care   - maintain fluid balance  - monitor pulse Ox, taper off oxygen if tolerated     # Dyspepsia/ Peptic Ulcer Disease/Hiatal hernia/ constipation   - GI consult: high risk for endoscopy, c/w conservative management  - bowel regimen miralax, senna, tap water enema q8 prn, lactulose prn   - d PPI  IV BID, c/w carafate 1g BID  - mesenteric doppler is negative for stenosis   - tramadol for pain control  - received  Golytely, having multiple BMs today      #LLE distal DVT   - Duplex (3/24/23) noted thrombus in left gastrocnemius and peroneal veins  - repeat duplex unchanged, was seen by vascular on recent admission  - was started on Eliquis given pt is not ambulating and remains on bed but pt has hx of PUD, and c/o of intermittent epigastric burn/ discomfort, would be at risk of GIB, daughter Meena Powell (at bedside) decided to hold on AC  - repeat duplex is negative for DVT, V/Q scan : low probability for PE, d/c therapeutic Lovenox, start prophylactic dose     # hyponatremia   - stable  - HCTZ on hold    #CKD III  - cr at baseline  - keep off lisinopril   - maintain fluid balance     #HTN  - c/w  amlodipine 2.5 mg daily  - holding HCTZ and lisinopril     #DM  - takes pioglitazone 15mg daily  - c/w insulin lantus 10U at bedtime, lispro 3U TID; monitor FS  - A1c 7.4% (3/2023)    DVT pp: cont  DNR DNI, consulted by hospice       #Progress Note Handoff  Pending (specify):  d/c Lovenox therapeutic dose, will give prophylactic dose,  supportive care, start Bentyl 10 mg TID for abdominal cramps, may give IV Tylenol, CHF protocol, maintain fluid balance    Family discussion: I spoke with pt and her daughters at the bedside at length on 4/16,, they agreed with a plan of care   Disposition: Home___/SNF___/Other________/Unknown at this time___x _____

## 2023-04-17 NOTE — PROGRESS NOTE ADULT - SUBJECTIVE AND OBJECTIVE BOX
RAUL LYNN 89y Female  MRN#: [id number]   CODE STATUS: DNR DNI     Admission Date: 04-03-23  Hospital Day: 14d    Pt is currently admitted with the primary diagnosis of     SUBJECTIVE  Hospital Course  DNR DNI 89 y/o woman w PMHx of HTN, HLD, DM, HFpEF, mitral stenosis, COPD, PUD, CKD3b w baseline Cr ~1.4, prior distal LLE DVT, w AC held 2/2 h/o PUD, recent admission for aspiration PNA and GERD (EGD not done 2/2 high risk), d/c on cefpodoxime, referred from [facility] for CP, SOB, hypoxia w SaO2 85% on RA, admitted for hypoxic respiratory failure.   While admitted, was treated w 7d course Cefepime/Levaquin completed 4/9/23 and completed prednisone taper 4/12/23. Noted to have abd pain 2/2 constipation/fecal impaction w mesenteric doppler and VQ scan neg.       Admission H&P   89 yold female to Ed Pmhx Htn, Hld, Ckd, emphysema, gerd, Dm; pt biba from [facility] for difficulty breathing, coughing,chest pain and hypoxia; pt currently not home O2 dep - noted to have PO RA 85 at facility; pt s/p recently admission for chronic pneumonia and chronic abdominal pain(gerd); endoscopy no done in hospital due to high morbidity; pt currenlty also on abx cefpidoxime at nursing home;  pt deneis fever, chills, sore throat, difficutly swallowing abdominal pain or urinary sx;    Overnight events      Subjective complaints        T(C): 35.6 (04-17-23 @ 05:00)  T(F): 96 (04-17-23 @ 05:00)  HR: 99 (04-17-23 @ 05:00)  BP: 149/73 (04-17-23 @ 05:00)  RR: 18 (04-17-23 @ 05:00)  SpO2: 97% (04-17-23 @ 05:00)        Present Today:   - Bowden:  No [  ], Yes [  ] : Indication:   - Type of IV Access:       .. CVC/Piccline:  No [  ], Yes [  ] : Indication:       .. Midline: No [  ], Yes [  ] : Indication:                                              OBJECTIVE  PAST MEDICAL & SURGICAL HISTORY  Hypertension    Diabetes mellitus    Chronic kidney disease, unspecified CKD stage                                                ALLERGIES:  penicillin (Unknown)                           HOME MEDICATIONS  Home Medications:  amLODIPine 2.5 mg oral tablet: 1 tab(s) orally once a day (03 Apr 2023 10:39)  Lipitor 20 mg oral tablet: 1 tab(s) orally once a day (03 Apr 2023 10:40)  pioglitazone 15 mg oral tablet: 1 tab(s) orally once a day (03 Apr 2023 10:40)                           MEDICATIONS:  STANDING MEDICATIONS  albuterol/ipratropium for Nebulization 3 milliLiter(s) Nebulizer every 12 hours  amLODIPine   Tablet 2.5 milliGRAM(s) Oral daily  atorvastatin 20 milliGRAM(s) Oral at bedtime  budesonide  80 MICROgram(s)/formoterol 4.5 MICROgram(s) Inhaler 2 Puff(s) Inhalation two times a day  chlorhexidine 2% Cloths 1 Application(s) Topical <User Schedule>  dextrose 5%. 1000 milliLiter(s) IV Continuous <Continuous>  dextrose 5%. 1000 milliLiter(s) IV Continuous <Continuous>  dextrose 50% Injectable 12.5 Gram(s) IV Push once  dextrose 50% Injectable 25 Gram(s) IV Push once  dextrose 50% Injectable 25 Gram(s) IV Push once  enoxaparin Injectable 50 milliGRAM(s) SubCutaneous every 12 hours  famotidine    Tablet 40 milliGRAM(s) Oral two times a day  folic acid 1 milliGRAM(s) Oral daily  glucagon  Injectable 1 milliGRAM(s) IntraMuscular once  insulin glargine Injectable (LANTUS) 10 Unit(s) SubCutaneous every morning  insulin lispro (ADMELOG) corrective regimen sliding scale   SubCutaneous three times a day before meals  insulin lispro (ADMELOG) corrective regimen sliding scale   SubCutaneous at bedtime  insulin lispro Injectable (ADMELOG) 3 Unit(s) SubCutaneous three times a day before meals  melatonin 5 milliGRAM(s) Oral at bedtime  pantoprazole    Tablet 40 milliGRAM(s) Oral two times a day  polyethylene glycol 3350 17 Gram(s) Oral <User Schedule>  senna 2 Tablet(s) Oral every 12 hours  sodium zirconium cyclosilicate 10 Gram(s) Oral once  sucralfate 1 Gram(s) Oral two times a day    PRN MEDICATIONS  acetaminophen     Tablet .. 650 milliGRAM(s) Oral every 6 hours PRN  albuterol/ipratropium for Nebulization 3 milliLiter(s) Nebulizer every 4 hours PRN  aluminum hydroxide/magnesium hydroxide/simethicone Suspension 30 milliLiter(s) Oral every 4 hours PRN  dextrose Oral Gel 15 Gram(s) Oral once PRN  lactulose Syrup 10 Gram(s) Oral every 12 hours PRN  traMADol 25 milliGRAM(s) Oral every 8 hours PRN                                            ------------------------------------------------------------  T(C): 35.6 (04-17-23 @ 05:00), Max: 37.1 (04-16-23 @ 21:14)  T(F): 96 (04-17-23 @ 05:00), Max: 98.8 (04-16-23 @ 21:14)  HR: 99 (04-17-23 @ 05:00) (62 - 120)  BP: 149/73 (04-17-23 @ 05:00) (124/58 - 149/73)  RR: 18 (04-17-23 @ 05:00) (18 - 19)  SpO2: 97% (04-17-23 @ 05:00) (96% - 98%)      04-16-23 @ 07:01  -  04-17-23 @ 06:19  --------------------------------------------------------  IN: 0 mL / OUT: 1000 mL / NET: -1000 mL                                               LABS:                        9.1    9.34  )-----------( 440      ( 16 Apr 2023 07:49 )             28.1     04-16    133<L>  |  99  |  42<H>  ----------------------------<  122<H>  5.3<H>   |  24  |  1.3    Ca    9.5      16 Apr 2023 07:49  Mg     1.8     04-16    TPro  5.9<L>  /  Alb  2.7<L>  /  TBili  <0.2  /  DBili  x   /  AST  12  /  ALT  14  /  AlkPhos  162<H>  04-16                              RADIOLOGY:           RAUL LYNN 89y Female  MRN#: [id number]   CODE STATUS: DNR DNI     Admission Date: 04-03-23  Hospital Day: 14d    Pt is currently admitted with the primary diagnosis of hypoxic respiratory failure     SUBJECTIVE  Hospital Course  DNR DNI 89 y/o woman w PMHx of HTN, HLD, DM, HFpEF, mitral stenosis, COPD, PUD, CKD3b w baseline Cr ~1.4, prior distal LLE DVT, w AC held 2/2 h/o PUD, recent admission for aspiration PNA and GERD (EGD not done 2/2 high risk), d/c on cefpodoxime, referred from [facility] for CP, SOB, hypoxia w SaO2 85% on RA, admitted for hypoxic respiratory failure.   While admitted, was treated w 7d course Cefepime/Levaquin completed 4/9/23 and completed prednisone taper 4/12/23. Noted to have abd pain 2/2 constipation/fecal impaction w mesenteric doppler and VQ scan neg.       Admission H&P   89 yold female to Ed Pmhx Htn, Hld, Ckd, emphysema, gerd, Dm; pt biba from [facility] for difficulty breathing, coughing,chest pain and hypoxia; pt currently not home O2 dep - noted to have PO RA 85 at facility; pt s/p recently admission for chronic pneumonia and chronic abdominal pain(gerd); endoscopy no done in hospital due to high morbidity; pt currenlty also on abx cefpidoxime at nursing home;  pt deneis fever, chills, sore throat, difficutly swallowing abdominal pain or urinary sx;    Overnight events  Passed BM x2     Subjective complaints  Still some abd discomfort. Repeating to RN and I loudly "I WANT PANCAKES"       T(C): 35.6 (04-17-23 @ 05:00)  T(F): 96 (04-17-23 @ 05:00)  HR: 99 (04-17-23 @ 05:00)  BP: 149/73 (04-17-23 @ 05:00)  RR: 18 (04-17-23 @ 05:00)  SpO2: 97% (04-17-23 @ 05:00)      PHYSICAL EXAM:  GENERAL: NAD, lying in bed comfortably, thin   HEAD:  Atraumatic, Normocephalic  EYES: EOMI, sclera clear  ENT: Moist mucous membranes. (+)rhonchorous cough   NECK: Supple, trachea midline  CHEST/LUNG: Clear to auscultation anteriorly bilaterally after cough; No significant rales, rhonchi, wheezing, or rubs. Unlabored respirations  HEART: Regular rate and rhythm; No appreciable murmurs, rubs, or gallops  ABDOMEN: (+)BS; Soft, nontender, nondistended  EXTREMITIES:  Multiple bruises BUE, no skin tears. 2+ Radial Pulses, brisk capillary refill. No clubbing, cyanosis, or edema. Toenails painted purple.   NERVOUS SYSTEM:  A&Ox3, no noted facial droop. Moving all extremities w/o difficulty.   SKIN: No rashes or lesions to b/l forearm, face.         Present Today:   - Bowden:  No [ X ], Yes [  ] : Indication:   - Type of IV Access:       .. CVC/Piccline:  No [ X ], Yes [  ] : Indication:       .. Midline: No [  ], Yes [  ] : Indication:                                              OBJECTIVE  PAST MEDICAL & SURGICAL HISTORY  Hypertension    Diabetes mellitus    Chronic kidney disease, unspecified CKD stage                                                ALLERGIES:  penicillin (Unknown)                           HOME MEDICATIONS  Home Medications:  amLODIPine 2.5 mg oral tablet: 1 tab(s) orally once a day (03 Apr 2023 10:39)  Lipitor 20 mg oral tablet: 1 tab(s) orally once a day (03 Apr 2023 10:40)  pioglitazone 15 mg oral tablet: 1 tab(s) orally once a day (03 Apr 2023 10:40)                           MEDICATIONS:  STANDING MEDICATIONS  albuterol/ipratropium for Nebulization 3 milliLiter(s) Nebulizer every 12 hours  amLODIPine   Tablet 2.5 milliGRAM(s) Oral daily  atorvastatin 20 milliGRAM(s) Oral at bedtime  budesonide  80 MICROgram(s)/formoterol 4.5 MICROgram(s) Inhaler 2 Puff(s) Inhalation two times a day  chlorhexidine 2% Cloths 1 Application(s) Topical <User Schedule>  dextrose 5%. 1000 milliLiter(s) IV Continuous <Continuous>  dextrose 5%. 1000 milliLiter(s) IV Continuous <Continuous>  dextrose 50% Injectable 12.5 Gram(s) IV Push once  dextrose 50% Injectable 25 Gram(s) IV Push once  dextrose 50% Injectable 25 Gram(s) IV Push once  enoxaparin Injectable 50 milliGRAM(s) SubCutaneous every 12 hours  famotidine    Tablet 40 milliGRAM(s) Oral two times a day  folic acid 1 milliGRAM(s) Oral daily  glucagon  Injectable 1 milliGRAM(s) IntraMuscular once  insulin glargine Injectable (LANTUS) 10 Unit(s) SubCutaneous every morning  insulin lispro (ADMELOG) corrective regimen sliding scale   SubCutaneous three times a day before meals  insulin lispro (ADMELOG) corrective regimen sliding scale   SubCutaneous at bedtime  insulin lispro Injectable (ADMELOG) 3 Unit(s) SubCutaneous three times a day before meals  melatonin 5 milliGRAM(s) Oral at bedtime  pantoprazole    Tablet 40 milliGRAM(s) Oral two times a day  polyethylene glycol 3350 17 Gram(s) Oral <User Schedule>  senna 2 Tablet(s) Oral every 12 hours  sodium zirconium cyclosilicate 10 Gram(s) Oral once  sucralfate 1 Gram(s) Oral two times a day    PRN MEDICATIONS  acetaminophen     Tablet .. 650 milliGRAM(s) Oral every 6 hours PRN  albuterol/ipratropium for Nebulization 3 milliLiter(s) Nebulizer every 4 hours PRN  aluminum hydroxide/magnesium hydroxide/simethicone Suspension 30 milliLiter(s) Oral every 4 hours PRN  dextrose Oral Gel 15 Gram(s) Oral once PRN  lactulose Syrup 10 Gram(s) Oral every 12 hours PRN  traMADol 25 milliGRAM(s) Oral every 8 hours PRN                                            ------------------------------------------------------------  T(C): 35.6 (04-17-23 @ 05:00), Max: 37.1 (04-16-23 @ 21:14)  T(F): 96 (04-17-23 @ 05:00), Max: 98.8 (04-16-23 @ 21:14)  HR: 99 (04-17-23 @ 05:00) (62 - 120)  BP: 149/73 (04-17-23 @ 05:00) (124/58 - 149/73)  RR: 18 (04-17-23 @ 05:00) (18 - 19)  SpO2: 97% (04-17-23 @ 05:00) (96% - 98%)      04-16-23 @ 07:01  -  04-17-23 @ 06:19  --------------------------------------------------------  IN: 0 mL / OUT: 1000 mL / NET: -1000 mL                                               LABS:                        9.1    9.34  )-----------( 440      ( 16 Apr 2023 07:49 )             28.1     04-16    133<L>  |  99  |  42<H>  ----------------------------<  122<H>  5.3<H>   |  24  |  1.3    Ca    9.5      16 Apr 2023 07:49  Mg     1.8     04-16    TPro  5.9<L>  /  Alb  2.7<L>  /  TBili  <0.2  /  DBili  x   /  AST  12  /  ALT  14  /  AlkPhos  162<H>  04-16                              RADIOLOGY:

## 2023-04-17 NOTE — PROGRESS NOTE ADULT - SUBJECTIVE AND OBJECTIVE BOX
Full consult to follow shortly. Please call [phone] with any acute concerns.     Patient appears more comfortable today, daughter feels tramadol is helpful; difficult to get full history from patient. Hospice is in contact, daughter appears interested and will speak further with them, goal is for return to [facility]    ______________  James Mayfield MD  Palliative Medicine  [facility]   of Geriatric and Palliative Medicine  [phone] HPI: 89F with PMH of HTN, DM, CKD, active smoker, COPD (no home O2), recent admission for aspiration PNA, here with hypoxia due to aspiration PNA and sepsis, s/p 7-day course of cefepime and levaquin and prednisone taper. Desaturated when walking with PT. Course c/b abdominal pain, GI evaluated, felt to be high risk for endoscopy, with continued pain of unclear etiology, now on tramadol (has tried IV dilaudid, IV morphine, tylenol). Also with LLE DVT, elqiuis held after discussion with daughter Paige Powell. Patient is DNR/DNI. Palliative care consulted for GOC.    INTERVAL EVENTS:  4/17: patient comfortable, appears more comfortable    ADVANCE DIRECTIVES:    [ ] Full Code [X ] DNR  MOLST  [ ]  Living Will  [ ]   DECISION MAKER(s):  [ ] Health Care Proxy(s)  [ ] Surrogate(s)  [ ] Guardian           Name(s): Phone Number(s): Paige Powell [phone]    BASELINE (I)ADL(s) (prior to admission):  [place]: [ ]Total  [ ] Moderate [ ]Dependent  Palliative Performance Status Version 2:         %    http://npcrc.org/files/news/palliative_performance_scale_ppsv2.pdf    Allergies    penicillin (Unknown)    Intolerances    MEDICATIONS  (STANDING):  albuterol/ipratropium for Nebulization 3 milliLiter(s) Nebulizer every 12 hours  amLODIPine   Tablet 2.5 milliGRAM(s) Oral daily  atorvastatin 20 milliGRAM(s) Oral at bedtime  budesonide  80 MICROgram(s)/formoterol 4.5 MICROgram(s) Inhaler 2 Puff(s) Inhalation two times a day  chlorhexidine 2% Cloths 1 Application(s) Topical <User Schedule>  dextrose 5%. 1000 milliLiter(s) (50 mL/Hr) IV Continuous <Continuous>  dextrose 5%. 1000 milliLiter(s) (100 mL/Hr) IV Continuous <Continuous>  dextrose 50% Injectable 12.5 Gram(s) IV Push once  dextrose 50% Injectable 25 Gram(s) IV Push once  dextrose 50% Injectable 25 Gram(s) IV Push once  enoxaparin Injectable 50 milliGRAM(s) SubCutaneous every 12 hours  famotidine    Tablet 40 milliGRAM(s) Oral two times a day  folic acid 1 milliGRAM(s) Oral daily  glucagon  Injectable 1 milliGRAM(s) IntraMuscular once  insulin glargine Injectable (LANTUS) 10 Unit(s) SubCutaneous every morning  insulin lispro (ADMELOG) corrective regimen sliding scale   SubCutaneous three times a day before meals  insulin lispro (ADMELOG) corrective regimen sliding scale   SubCutaneous at bedtime  insulin lispro Injectable (ADMELOG) 3 Unit(s) SubCutaneous three times a day before meals  melatonin 5 milliGRAM(s) Oral at bedtime  pantoprazole    Tablet 40 milliGRAM(s) Oral two times a day  polyethylene glycol 3350 17 Gram(s) Oral <User Schedule>  senna 2 Tablet(s) Oral every 12 hours  sodium zirconium cyclosilicate 10 Gram(s) Oral once  sucralfate 1 Gram(s) Oral two times a day    MEDICATIONS  (PRN):  acetaminophen     Tablet .. 650 milliGRAM(s) Oral every 6 hours PRN Moderate Pain (4 - 6)  albuterol/ipratropium for Nebulization 3 milliLiter(s) Nebulizer every 4 hours PRN Shortness of Breath and/or Wheezing  aluminum hydroxide/magnesium hydroxide/simethicone Suspension 30 milliLiter(s) Oral every 4 hours PRN Dyspepsia  dextrose Oral Gel 15 Gram(s) Oral once PRN Blood Glucose LESS THAN 70 milliGRAM(s)/deciliter  lactulose Syrup 10 Gram(s) Oral every 12 hours PRN constipation  traMADol 25 milliGRAM(s) Oral every 8 hours PRN Severe Pain (7 - 10)      PRESENT SYMPTOMS: [ ]Unable to obtain due to poor mentation   Source if other than patient:  [ X]Family   [ ]Team     Pain: [ ]yes [X ]no  QOL impact -   Location -                  Aggravating factors -   Quality -  Radiation -   Timing- constant  Severity (0-10 scale):   Minimal acceptable level (0-10 scale):     CPOT:    https://www.sccm.org/getattachment/ekj95o24-9i4p-8v8g-5j5h-8956h8344r5j/Critical-Care-Pain-Observation-Tool-(CPOT)    PAIN AD Score:   http://[email or web address]/cog/painad.pdf (press ctrl +  left click to view)    Dyspnea:                           [ ]None[ ]Mild [ ]Moderate [ ]Severe     Respiratory Distress Observation Scale (RDOS):   A score of 0 to 2 signifies little or no respiratory distress, 3 signifies mild distress, scores 4 to 6 indicate moderate distress, and scores greater than 7 signify severe distress  [email or web address]/[email or web address]/files/PDFS/644419-bfienqjdmmm-tduqfpaa-cmtaxksxpwj-bntnp.pdf    Anxiety:                             [ ]None[ ]Mild [ ]Moderate [ ]Severe   Fatigue:                             [ ]None[ ]Mild [ ]Moderate [ ]Severe   Nausea:                             [ ]None[ ]Mild [ ]Moderate [ ]Severe   Loss of appetite:              [ ]None[ ]Mild [ ]Moderate [ ]Severe   Constipation:                    [ ]None[ ]Mild [ ]Moderate [ ]Severe    Other Symptoms:  [X ]All other review of systems negative     Palliative Performance Status Version 2:         %    http://[email or web address]/news/palliative_performance_scale_[email or web address].pdf  PHYSICAL EXAM:  Vital Signs Last 24 Hrs  T(C): 36.7 (17 Apr 2023 14:00), Max: 37.1 (16 Apr 2023 21:14)  T(F): 98.1 (17 Apr 2023 14:00), Max: 98.8 (16 Apr 2023 21:14)  HR: 99 (17 Apr 2023 14:00) (62 - 120)  BP: 136/70 (17 Apr 2023 14:00) (134/74 - 149/73)  BP(mean): --  RR: 18 (17 Apr 2023 14:00) (18 - 19)  SpO2: 98% (17 Apr 2023 14:00) (97% - 98%)    Parameters below as of 16 Apr 2023 21:14  Patient On (Oxygen Delivery Method): nasal cannula  O2 Flow (L/min): 2    GENERAL:  [X ] No acute distress [ ]Lethargic  [ ]Unarousable  [ ]Verbal  [ ]Non-Verbal [ ]Cachexia    BEHAVIORAL/PSYCH:  [ ]Alert and Oriented x  [ ] Anxiety [ ] Delirium [ ] Agitation [X ] Calm   EYES: [X ] No scleral icterus [ ] Scleral icterus [ ] Closed  ENMT:  [ ]Dry mouth  [ ]No external oral lesions [ X] No external ear or nose lesions  CARDIOVASCULAR:  [ ]Regular [ ]Irregular [ ]Tachy [ ]Not Tachy  [ ]Jacobo [ ] Edema [ ] No edema  PULMONARY:  [ ]Tachypnea  [ ]Audible excessive secretions [X ] No labored breathing [ ] labored breathing  GASTROINTESTINAL: [ ]Soft  [ ]Distended  [ X]Not distended [ ]Non tender [ ]Tender  MUSCULOSKELETAL: [ ]No clubbing [ ] clubbing  [X ] No cyanosis [ ] cyanosis  NEUROLOGIC: [X ]No focal deficits  [ ]Follows commands  [ ]Does not follow commands  [ ]Cognitive impairment  [ ]Dysphagia  [ ]Dysarthria  [ ]Paresis   SKIN: [ ] Jaundiced [X ] Non-jaundiced [ ]Rash [ ]No Rash [ ] Warm [ ] Dry  MISC/LINES: [ ] ET tube [ ] Trach [ ]NGT/OGT [ ]PEG [ ]Bowden    CRITICAL CARE:  [ ] Shock Present  [ ]Septic [ ]Cardiogenic [ ]Neurologic [ ]Hypovolemic  [ ]  Vasopressors [ ]  Inotropes   [ ]Respiratory failure present [ ]Mechanical ventilation [ ]Non-invasive ventilatory support [ ]High flow  [ ]Acute  [ ]Chronic [ ]Hypoxic  [ ]Hypercarbic [ ]Other  [ ]Other organ failure     LABS: reviewed by me                           9.0    8.00  )-----------( 422      ( 17 Apr 2023 08:11 )             28.6     04-17    134<L>  |  101  |  41<H>  ----------------------------<  108<H>  5.4<H>   |  24  |  1.2    Ca    9.5      17 Apr 2023 08:11  Mg     1.9     04-17        RADIOLOGY & ADDITIONAL STUDIES: reviewed by me    CXR 4/14/23  Bilateral opacities, overall unchanged.    PROTEIN CALORIE MALNUTRITION PRESENT: [ ]mild [ ]moderate [ ]severe [ ]underweight [ ]morbid obesity  https://www.andeal.org/vault/2440/web/files/ONC/Table_Clinical%20Characteristics%20to%20Document%20Malnutrition-White%20JV%20et%20al%202012.pdf    Height (cm): 152.4 (04-03-23 @ 01:46), 155 (03-23-23 @ 11:02)  Weight (kg): 47.6 (04-03-23 @ 01:46), 43.1 (03-22-23 @ 13:03)  BMI (kg/m2): 20.5 (04-03-23 @ 01:46), 17.9 (03-23-23 @ 11:02)    [ ]PPSV2 < or = to 30% [ ]significant weight loss  [ ]poor nutritional intake  [ ]anasarca      [ ]Artificial Nutrition      REFERRALS:   [ ]Chaplaincy  [ ]Hospice  [ ]Child Life  [ ]Social Work  [ ]Case management [ ]Holistic Therapy     Palliative care education provided to patient and/or family    Goals of Care Document:     ______________  James Mayfield MD  Palliative Medicine  [facility]   of Geriatric and Palliative Medicine  [phone]

## 2023-04-18 LAB
ANION GAP SERPL CALC-SCNC: 11 MMOL/L — SIGNIFICANT CHANGE UP (ref 7–14)
BLD GP AB SCN SERPL QL: SIGNIFICANT CHANGE UP
BUN SERPL-MCNC: 40 MG/DL — HIGH (ref 10–20)
CALCIUM SERPL-MCNC: 9.5 MG/DL — SIGNIFICANT CHANGE UP (ref 8.4–10.4)
CHLORIDE SERPL-SCNC: 97 MMOL/L — LOW (ref 98–110)
CO2 SERPL-SCNC: 24 MMOL/L — SIGNIFICANT CHANGE UP (ref 17–32)
CREAT SERPL-MCNC: 1.2 MG/DL — SIGNIFICANT CHANGE UP (ref 0.7–1.5)
EGFR: 43 ML/MIN/1.73M2 — LOW
GLUCOSE BLDC GLUCOMTR-MCNC: 130 MG/DL — HIGH (ref 70–99)
GLUCOSE BLDC GLUCOMTR-MCNC: 130 MG/DL — HIGH (ref 70–99)
GLUCOSE BLDC GLUCOMTR-MCNC: 141 MG/DL — HIGH (ref 70–99)
GLUCOSE BLDC GLUCOMTR-MCNC: 146 MG/DL — HIGH (ref 70–99)
GLUCOSE SERPL-MCNC: 105 MG/DL — HIGH (ref 70–99)
HCT VFR BLD CALC: 28 % — LOW (ref 37–47)
HGB BLD-MCNC: 8.9 G/DL — LOW (ref 12–16)
MAGNESIUM SERPL-MCNC: 1.7 MG/DL — LOW (ref 1.8–2.4)
MCHC RBC-ENTMCNC: 28.2 PG — SIGNIFICANT CHANGE UP (ref 27–31)
MCHC RBC-ENTMCNC: 31.8 G/DL — LOW (ref 32–37)
MCV RBC AUTO: 88.6 FL — SIGNIFICANT CHANGE UP (ref 81–99)
NRBC # BLD: 0 /100 WBCS — SIGNIFICANT CHANGE UP (ref 0–0)
PLATELET # BLD AUTO: 451 K/UL — HIGH (ref 130–400)
PMV BLD: 10 FL — SIGNIFICANT CHANGE UP (ref 7.4–10.4)
POTASSIUM SERPL-MCNC: 5 MMOL/L — SIGNIFICANT CHANGE UP (ref 3.5–5)
POTASSIUM SERPL-SCNC: 5 MMOL/L — SIGNIFICANT CHANGE UP (ref 3.5–5)
RBC # BLD: 3.16 M/UL — LOW (ref 4.2–5.4)
RBC # FLD: 16.3 % — HIGH (ref 11.5–14.5)
SODIUM SERPL-SCNC: 132 MMOL/L — LOW (ref 135–146)
WBC # BLD: 7.95 K/UL — SIGNIFICANT CHANGE UP (ref 4.8–10.8)
WBC # FLD AUTO: 7.95 K/UL — SIGNIFICANT CHANGE UP (ref 4.8–10.8)

## 2023-04-18 PROCEDURE — 99232 SBSQ HOSP IP/OBS MODERATE 35: CPT

## 2023-04-18 RX ORDER — AMLODIPINE BESYLATE 2.5 MG/1
1 TABLET ORAL
Qty: 30 | Refills: 0
Start: 2023-04-18 | End: 2023-05-17

## 2023-04-18 RX ORDER — SENNA PLUS 8.6 MG/1
2 TABLET ORAL
Qty: 120 | Refills: 3
Start: 2023-04-18 | End: 2023-08-15

## 2023-04-18 RX ORDER — PIOGLITAZONE HYDROCHLORIDE 15 MG/1
1 TABLET ORAL
Qty: 30 | Refills: 3
Start: 2023-04-18 | End: 2023-08-15

## 2023-04-18 RX ORDER — ALBUTEROL 90 UG/1
1 AEROSOL, METERED ORAL
Qty: 1 | Refills: 3
Start: 2023-04-18 | End: 2023-08-15

## 2023-04-18 RX ORDER — SUCRALFATE 1 G
1 TABLET ORAL
Qty: 120 | Refills: 3
Start: 2023-04-18 | End: 2023-08-15

## 2023-04-18 RX ORDER — IPRATROPIUM/ALBUTEROL SULFATE 18-103MCG
3 AEROSOL WITH ADAPTER (GRAM) INHALATION
Qty: 60 | Refills: 3
Start: 2023-04-18 | End: 2023-08-15

## 2023-04-18 RX ORDER — FOLIC ACID 0.8 MG
1 TABLET ORAL
Qty: 30 | Refills: 3
Start: 2023-04-18 | End: 2023-08-15

## 2023-04-18 RX ORDER — ATORVASTATIN CALCIUM 80 MG/1
1 TABLET, FILM COATED ORAL
Qty: 30 | Refills: 3
Start: 2023-04-18 | End: 2023-08-15

## 2023-04-18 RX ORDER — BUDESONIDE AND FORMOTEROL FUMARATE DIHYDRATE 160; 4.5 UG/1; UG/1
1 AEROSOL RESPIRATORY (INHALATION)
Qty: 2 | Refills: 3
Start: 2023-04-18 | End: 2023-08-15

## 2023-04-18 RX ORDER — ATORVASTATIN CALCIUM 80 MG/1
1 TABLET, FILM COATED ORAL
Qty: 0 | Refills: 0 | DISCHARGE

## 2023-04-18 RX ORDER — MAGNESIUM SULFATE 500 MG/ML
2 VIAL (ML) INJECTION ONCE
Refills: 0 | Status: DISCONTINUED | OUTPATIENT
Start: 2023-04-18 | End: 2023-04-19

## 2023-04-18 RX ORDER — PIOGLITAZONE HYDROCHLORIDE 15 MG/1
1 TABLET ORAL
Qty: 0 | Refills: 0 | DISCHARGE

## 2023-04-18 RX ORDER — AMLODIPINE BESYLATE 2.5 MG/1
1 TABLET ORAL
Refills: 0 | DISCHARGE

## 2023-04-18 RX ORDER — POLYETHYLENE GLYCOL 3350 17 G/17G
17 POWDER, FOR SOLUTION ORAL
Qty: 510 | Refills: 3
Start: 2023-04-18 | End: 2023-08-15

## 2023-04-18 RX ORDER — PANTOPRAZOLE SODIUM 20 MG/1
1 TABLET, DELAYED RELEASE ORAL
Qty: 60 | Refills: 3
Start: 2023-04-18 | End: 2023-08-15

## 2023-04-18 RX ADMIN — Medication 5 MILLIGRAM(S): at 21:05

## 2023-04-18 RX ADMIN — TRAMADOL HYDROCHLORIDE 25 MILLIGRAM(S): 50 TABLET ORAL at 22:39

## 2023-04-18 RX ADMIN — BUDESONIDE AND FORMOTEROL FUMARATE DIHYDRATE 2 PUFF(S): 160; 4.5 AEROSOL RESPIRATORY (INHALATION) at 21:06

## 2023-04-18 RX ADMIN — POLYETHYLENE GLYCOL 3350 17 GRAM(S): 17 POWDER, FOR SOLUTION ORAL at 21:05

## 2023-04-18 RX ADMIN — Medication 3 UNIT(S): at 17:03

## 2023-04-18 RX ADMIN — Medication 0: at 11:56

## 2023-04-18 RX ADMIN — TRAMADOL HYDROCHLORIDE 25 MILLIGRAM(S): 50 TABLET ORAL at 14:44

## 2023-04-18 RX ADMIN — Medication 0: at 17:03

## 2023-04-18 RX ADMIN — ATORVASTATIN CALCIUM 20 MILLIGRAM(S): 80 TABLET, FILM COATED ORAL at 21:05

## 2023-04-18 RX ADMIN — Medication 3 MILLILITER(S): at 10:08

## 2023-04-18 RX ADMIN — TRAMADOL HYDROCHLORIDE 25 MILLIGRAM(S): 50 TABLET ORAL at 15:19

## 2023-04-18 RX ADMIN — Medication 1 GRAM(S): at 17:03

## 2023-04-18 RX ADMIN — TRAMADOL HYDROCHLORIDE 25 MILLIGRAM(S): 50 TABLET ORAL at 23:30

## 2023-04-18 RX ADMIN — INSULIN GLARGINE 10 UNIT(S): 100 INJECTION, SOLUTION SUBCUTANEOUS at 11:55

## 2023-04-18 RX ADMIN — SENNA PLUS 2 TABLET(S): 8.6 TABLET ORAL at 17:03

## 2023-04-18 RX ADMIN — HEPARIN SODIUM 5000 UNIT(S): 5000 INJECTION INTRAVENOUS; SUBCUTANEOUS at 17:03

## 2023-04-18 RX ADMIN — Medication 650 MILLIGRAM(S): at 23:29

## 2023-04-18 RX ADMIN — Medication 1 MILLIGRAM(S): at 11:55

## 2023-04-18 RX ADMIN — PANTOPRAZOLE SODIUM 40 MILLIGRAM(S): 20 TABLET, DELAYED RELEASE ORAL at 17:03

## 2023-04-18 RX ADMIN — Medication 3 MILLILITER(S): at 19:03

## 2023-04-18 RX ADMIN — HEPARIN SODIUM 5000 UNIT(S): 5000 INJECTION INTRAVENOUS; SUBCUTANEOUS at 06:36

## 2023-04-18 RX ADMIN — Medication 3 UNIT(S): at 11:56

## 2023-04-18 NOTE — PROGRESS NOTE ADULT - PROBLEM SELECTOR PLAN 2
- c/w tramadol PO Q8 PRN - 1/24 hours used  - unclear etiology  - appears to be effective
- c/w tramadol PO Q8 PRN  - unclear etiology

## 2023-04-18 NOTE — PROGRESS NOTE ADULT - PROBLEM SELECTOR PLAN 6
- will sign off  - reconsult PRN  ______________  James Mayfield MD  Palliative Medicine  [facility]   of Geriatric and Palliative Medicine  [phone]
- will follow  ______________  James Mayfield MD  Palliative Medicine  [facility]   of Geriatric and Palliative Medicine  [phone]

## 2023-04-18 NOTE — PROGRESS NOTE ADULT - SUBJECTIVE AND OBJECTIVE BOX
RAUL LYNN 89y Female  MRN#: [id number]   CODE STATUS: DNR DNI     Admission Date: 04-03-23  Hospital Day: 15d    Pt is currently admitted with the primary diagnosis of hypoxic respiratory failure     SUBJECTIVE  Hospital Course  DNR DNI 89 y/o woman w PMHx of HTN, HLD, DM, HFpEF, mitral stenosis, COPD, PUD, CKD3b w baseline Cr ~1.4, prior distal LLE DVT, w AC held 2/2 h/o PUD, recent admission for aspiration PNA and GERD (EGD not done 2/2 high risk), d/c on cefpodoxime, referred from [facility] for CP, SOB, hypoxia w SaO2 85% on RA, admitted for hypoxic respiratory failure.   While admitted, was treated w 7d course Cefepime/Levaquin completed 4/9/23 and completed prednisone taper 4/12/23. Noted to have abd pain 2/2 constipation/fecal impaction w mesenteric doppler and VQ scan neg. Constipation resolved w aggressive bowel regimen including Golytely.     4/18/23 day 15:       Admission H&P   89 yold female to [facility] Htn, Hld, Ckd, emphysema, gerd, Dm; pt biba from [facility] for difficulty breathing, coughing,chest pain and hypoxia; pt currently not home O2 dep - noted to have PO RA 85 at facility; pt s/p recently admission for chronic pneumonia and chronic abdominal pain(gerd); endoscopy no done in hospital due to high morbidity; pt currenlty also on abx cefpidoxime at nursing home;  pt deneis fever, chills, sore throat, difficutly swallowing abdominal pain or urinary sx;    Overnight events       Subjective complaints                 PHYSICAL EXAM:  GENERAL: NAD, lying in bed comfortably, thin   HEAD:  Atraumatic, Normocephalic  EYES: EOMI, sclera clear  ENT: Moist mucous membranes. (+)rhonchorous cough   NECK: Supple, trachea midline  CHEST/LUNG: Clear to auscultation anteriorly bilaterally after cough; No significant rales, rhonchi, wheezing, or rubs. Unlabored respirations  HEART: Regular rate and rhythm; No appreciable murmurs, rubs, or gallops  ABDOMEN: (+)BS; Soft, nontender, nondistended  EXTREMITIES:  Multiple bruises BUE, no skin tears. 2+ Radial Pulses, brisk capillary refill. No clubbing, cyanosis, or edema. Toenails painted purple.   NERVOUS SYSTEM:  A&Ox3, no noted facial droop. Moving all extremities w/o difficulty.   SKIN: No rashes or lesions to b/l forearm, face.         Present Today:   - Bowden:  No [ X ], Yes [  ] : Indication:   - Type of IV Access:       .. CVC/Piccline:  No [ X ], Yes [  ] : Indication:       .. Midline: No [  ], Yes [  ] : Indication:                    RAUL LYNN 89y Female  MRN#: [id number]   CODE STATUS: DNR DNI     Admission Date: 04-03-23  Hospital Day: 15d    Pt is currently admitted with the primary diagnosis of hypoxic respiratory failure     SUBJECTIVE  Hospital Course  DNR DNI 89 y/o woman w PMHx of HTN, HLD, DM, HFpEF, mitral stenosis, COPD, PUD, CKD3b w baseline Cr ~1.4, prior distal LLE DVT, w AC held 2/2 h/o PUD, recent admission for aspiration PNA and GERD (EGD not done 2/2 high risk), d/c on cefpodoxime, referred from [facility] for CP, SOB, hypoxia w SaO2 85% on RA, admitted for hypoxic respiratory failure.   While admitted, was treated w 7d course Cefepime/Levaquin completed 4/9/23 and completed prednisone taper 4/12/23. Noted to have abd pain 2/2 constipation/fecal impaction w mesenteric doppler and VQ scan neg. Constipation resolved w aggressive bowel regimen including Golytely.     4/18/23 day 15: Anticipating DC 4/19/23       Admission H&P   89 yold female to [facility] Htn, Hld, Ckd, emphysema, gerd, Dm; pt biba from [facility] for difficulty breathing, coughing,chest pain and hypoxia; pt currently not home O2 dep - noted to have PO RA 85 at facility; pt s/p recently admission for chronic pneumonia and chronic abdominal pain(gerd); endoscopy no done in hospital due to high morbidity; pt currenlty also on abx cefpidoxime at nursing home;  pt deneis fever, chills, sore throat, difficutly swallowing abdominal pain or urinary sx;    Overnight events  None significant       Subjective complaints  Still reporting that she is being given ma&cheese w carrots for breakfast. I suspect this is not the case.                PHYSICAL EXAM:  GENERAL: NAD, lying in bed comfortably, thin   HEAD:  Atraumatic, Normocephalic  EYES: EOMI, sclera clear  ENT: Moist mucous membranes. (+)rhonchorous cough intermittently. Hearing aid at bedside.   NECK: Supple, trachea midline  CHEST/LUNG: Clear to auscultation anteriorly bilaterally after cough; No significant rales, rhonchi, wheezing, or rubs. Unlabored respirations. Transmitted upper airway sounds.   HEART: Regular rate and rhythm; No appreciable murmurs, rubs, or gallops  ABDOMEN: (+)BS; Soft, nontender, nondistended  EXTREMITIES:  Multiple bruises BUE, no skin tears. 2+ Radial Pulses, brisk capillary refill. No clubbing, cyanosis, or edema. Toenails painted purple.   NERVOUS SYSTEM:  A&Ox3, no noted facial droop. Moving all extremities w/o difficulty.   SKIN: No rashes or lesions to b/l forearm, face.         Present Today:   - Bowden:  No [ X ], Yes [  ] : Indication:   - Type of IV Access:       .. CVC/Piccline:  No [ X ], Yes [  ] : Indication:       .. Midline: No [ X ], Yes [  ] : Indication:             T(C): 36.3 (04-18-23 @ 14:00)  T(F): 97.4 (04-18-23 @ 14:00)  HR: 91 (04-18-23 @ 14:00)  BP: 142/71 (04-18-23 @ 14:00)  RR: 19 (04-18-23 @ 14:00)  SpO2: 98% (04-18-23 @ 14:00)                                                   OBJECTIVE  PAST MEDICAL & SURGICAL HISTORY  Hypertension    Diabetes mellitus    Chronic kidney disease, unspecified CKD stage                                                ALLERGIES:  penicillin (Unknown)                           HOME MEDICATIONS  Home Medications:                           MEDICATIONS:  STANDING MEDICATIONS  albuterol/ipratropium for Nebulization 3 milliLiter(s) Nebulizer every 12 hours  amLODIPine   Tablet 2.5 milliGRAM(s) Oral daily  atorvastatin 20 milliGRAM(s) Oral at bedtime  budesonide  80 MICROgram(s)/formoterol 4.5 MICROgram(s) Inhaler 2 Puff(s) Inhalation two times a day  chlorhexidine 2% Cloths 1 Application(s) Topical <User Schedule>  dextrose 5%. 1000 milliLiter(s) IV Continuous <Continuous>  dextrose 5%. 1000 milliLiter(s) IV Continuous <Continuous>  dextrose 50% Injectable 25 Gram(s) IV Push once  dextrose 50% Injectable 25 Gram(s) IV Push once  dextrose 50% Injectable 12.5 Gram(s) IV Push once  folic acid 1 milliGRAM(s) Oral daily  glucagon  Injectable 1 milliGRAM(s) IntraMuscular once  heparin   Injectable 5000 Unit(s) SubCutaneous every 12 hours  insulin glargine Injectable (LANTUS) 10 Unit(s) SubCutaneous every morning  insulin lispro (ADMELOG) corrective regimen sliding scale   SubCutaneous at bedtime  insulin lispro (ADMELOG) corrective regimen sliding scale   SubCutaneous three times a day before meals  insulin lispro Injectable (ADMELOG) 3 Unit(s) SubCutaneous three times a day before meals  magnesium sulfate  IVPB 2 Gram(s) IV Intermittent once  melatonin 5 milliGRAM(s) Oral at bedtime  pantoprazole    Tablet 40 milliGRAM(s) Oral two times a day  polyethylene glycol 3350 17 Gram(s) Oral <User Schedule>  senna 2 Tablet(s) Oral every 12 hours  sodium zirconium cyclosilicate 10 Gram(s) Oral once  sucralfate 1 Gram(s) Oral two times a day    PRN MEDICATIONS  acetaminophen     Tablet .. 650 milliGRAM(s) Oral every 6 hours PRN  albuterol/ipratropium for Nebulization 3 milliLiter(s) Nebulizer every 4 hours PRN  aluminum hydroxide/magnesium hydroxide/simethicone Suspension 30 milliLiter(s) Oral every 4 hours PRN  dextrose Oral Gel 15 Gram(s) Oral once PRN  lactulose Syrup 10 Gram(s) Oral every 12 hours PRN  traMADol 25 milliGRAM(s) Oral every 8 hours PRN                                            ------------------------------------------------------------  T(C): 36.3 (04-18-23 @ 14:00), Max: 36.9 (04-17-23 @ 20:00)  T(F): 97.4 (04-18-23 @ 14:00), Max: 98.4 (04-17-23 @ 20:00)  HR: 91 (04-18-23 @ 14:00) (74 - 94)  BP: 142/71 (04-18-23 @ 14:00) (135/63 - 145/70)  RR: 19 (04-18-23 @ 14:00) (18 - 20)  SpO2: 98% (04-18-23 @ 14:00) (97% - 98%)                                               LABS:                        8.9    7.95  )-----------( 451      ( 18 Apr 2023 07:37 )             28.0     04-18    132<L>  |  97<L>  |  40<H>  ----------------------------<  105<H>  5.0   |  24  |  1.2    Ca    9.5      18 Apr 2023 07:37  Mg     1.7     04-18    TPro  6.2  /  Alb  2.8<L>  /  TBili  0.3  /  DBili  x   /  AST  12  /  ALT  13  /  AlkPhos  174<H>  04-17

## 2023-04-18 NOTE — PROGRESS NOTE ADULT - SUBJECTIVE AND OBJECTIVE BOX
HPI: 89F with PMH of HTN, DM, CKD, active smoker, COPD (no home O2), recent admission for aspiration PNA, here with hypoxia due to aspiration PNA and sepsis, s/p 7-day course of cefepime and levaquin and prednisone taper. Desaturated when walking with PT. Course c/b abdominal pain, GI evaluated, felt to be high risk for endoscopy, with continued pain of unclear etiology, now on tramadol (has tried IV dilaudid, IV morphine, tylenol). Also with LLE DVT, elqiuis held after discussion with daughter Paige Powell. Patient is DNR/DNI. Palliative care consulted for GOC.    INTERVAL EVENTS:  4/17: patient appears more comfortable  4/18: patient states being comfortable    ADVANCE DIRECTIVES:    [ ] Full Code [X ] DNR  MOLST  [ ]  Living Will  [ ]   DECISION MAKER(s):  [ ] Health Care Proxy(s)  [ ] Surrogate(s)  [ ] Guardian           Name(s): Phone Number(s): Paige Powell [phone]    BASELINE (I)ADL(s) (prior to admission):  [place]: [ ]Total  [ ] Moderate [ ]Dependent  Palliative Performance Status Version 2:         %    http://npcrc.org/files/news/palliative_performance_scale_ppsv2.pdf    Allergies    penicillin (Unknown)    Intolerances    MEDICATIONS  (STANDING):  albuterol/ipratropium for Nebulization 3 milliLiter(s) Nebulizer every 12 hours  amLODIPine   Tablet 2.5 milliGRAM(s) Oral daily  atorvastatin 20 milliGRAM(s) Oral at bedtime  budesonide  80 MICROgram(s)/formoterol 4.5 MICROgram(s) Inhaler 2 Puff(s) Inhalation two times a day  chlorhexidine 2% Cloths 1 Application(s) Topical <User Schedule>  dextrose 5%. 1000 milliLiter(s) (50 mL/Hr) IV Continuous <Continuous>  dextrose 5%. 1000 milliLiter(s) (100 mL/Hr) IV Continuous <Continuous>  dextrose 50% Injectable 12.5 Gram(s) IV Push once  dextrose 50% Injectable 25 Gram(s) IV Push once  dextrose 50% Injectable 25 Gram(s) IV Push once  folic acid 1 milliGRAM(s) Oral daily  glucagon  Injectable 1 milliGRAM(s) IntraMuscular once  heparin   Injectable 5000 Unit(s) SubCutaneous every 12 hours  insulin glargine Injectable (LANTUS) 10 Unit(s) SubCutaneous every morning  insulin lispro (ADMELOG) corrective regimen sliding scale   SubCutaneous three times a day before meals  insulin lispro (ADMELOG) corrective regimen sliding scale   SubCutaneous at bedtime  insulin lispro Injectable (ADMELOG) 3 Unit(s) SubCutaneous three times a day before meals  magnesium sulfate  IVPB 2 Gram(s) IV Intermittent once  melatonin 5 milliGRAM(s) Oral at bedtime  pantoprazole    Tablet 40 milliGRAM(s) Oral two times a day  polyethylene glycol 3350 17 Gram(s) Oral <User Schedule>  senna 2 Tablet(s) Oral every 12 hours  sodium zirconium cyclosilicate 10 Gram(s) Oral once  sucralfate 1 Gram(s) Oral two times a day    MEDICATIONS  (PRN):  acetaminophen     Tablet .. 650 milliGRAM(s) Oral every 6 hours PRN Moderate Pain (4 - 6)  albuterol/ipratropium for Nebulization 3 milliLiter(s) Nebulizer every 4 hours PRN Shortness of Breath and/or Wheezing  aluminum hydroxide/magnesium hydroxide/simethicone Suspension 30 milliLiter(s) Oral every 4 hours PRN Dyspepsia  dextrose Oral Gel 15 Gram(s) Oral once PRN Blood Glucose LESS THAN 70 milliGRAM(s)/deciliter  lactulose Syrup 10 Gram(s) Oral every 12 hours PRN constipation  traMADol 25 milliGRAM(s) Oral every 8 hours PRN Severe Pain (7 - 10)        PRESENT SYMPTOMS: [ ]Unable to obtain due to poor mentation   Source if other than patient:  [ ] Family   [ ]Team     Pain: [ ]yes [X ]no  QOL impact -   Location -                  Aggravating factors -   Quality -  Radiation -   Timing- constant  Severity (0-10 scale):   Minimal acceptable level (0-10 scale):     CPOT:    https://www.[facility].org/getattachment/[email or web address]/Critical-Care-Pain-Observation-Tool-(CPOT)    PAIN AD Score:   http://geriatrictoolkit.[facility]/cog/painad.pdf (press ctrl +  left click to view)    Dyspnea:                           [ ]None[ ]Mild [ ]Moderate [ ]Severe     Respiratory Distress Observation Scale (RDOS):   A score of 0 to 2 signifies little or no respiratory distress, 3 signifies mild distress, scores 4 to 6 indicate moderate distress, and scores greater than 7 signify severe distress  https://[email or web address].ca/sites/default/files/PDFS/763817-ijbtsbcptjn-gihyphsc-rikzjkkanan-mmeev.pdf    Anxiety:                             [ ]None[ ]Mild [ ]Moderate [ ]Severe   Fatigue:                             [ ]None[ ]Mild [ ]Moderate [ ]Severe   Nausea:                             [ ]None[ ]Mild [ ]Moderate [ ]Severe   Loss of appetite:              [ ]None[ ]Mild [ ]Moderate [ ]Severe   Constipation:                    [ ]None[ ]Mild [ ]Moderate [ ]Severe    Other Symptoms:  [X ]All other review of systems negative     Palliative Performance Status Version 2:         %    http://npcrc.org/files/news/palliative_performance_scale_ppsv2.pdf  PHYSICAL EXAM:  Vital Signs Last 24 Hrs  T(C): 36.3 (18 Apr 2023 14:00), Max: 36.9 (17 Apr 2023 20:00)  T(F): 97.4 (18 Apr 2023 14:00), Max: 98.4 (17 Apr 2023 20:00)  HR: 91 (18 Apr 2023 14:00) (74 - 94)  BP: 142/71 (18 Apr 2023 14:00) (135/63 - 145/70)  BP(mean): --  RR: 19 (18 Apr 2023 14:00) (18 - 20)  SpO2: 98% (18 Apr 2023 14:00) (97% - 98%)    Parameters below as of 17 Apr 2023 20:00  Patient On (Oxygen Delivery Method): nasal cannula  O2 Flow (L/min): 2    GENERAL:  [X ] No acute distress [ ]Lethargic  [ ]Unarousable  [ ]Verbal  [ ]Non-Verbal [ ]Cachexia    BEHAVIORAL/PSYCH:  [ ]Alert and Oriented x  [ ] Anxiety [ ] Delirium [ ] Agitation [X ] Calm   EYES: [X ] No scleral icterus [ ] Scleral icterus [ ] Closed  ENMT:  [ ]Dry mouth  [ ]No external oral lesions [ X] No external ear or nose lesions  CARDIOVASCULAR:  [ ]Regular [ ]Irregular [ ]Tachy [ ]Not Tachy  [ ]Jacobo [ ] Edema [ ] No edema  PULMONARY:  [ ]Tachypnea  [ ]Audible excessive secretions [X ] No labored breathing [ ] labored breathing  GASTROINTESTINAL: [ ]Soft  [ ]Distended  [ X]Not distended [ ]Non tender [ ]Tender  MUSCULOSKELETAL: [ ]No clubbing [ ] clubbing  [X ] No cyanosis [ ] cyanosis  NEUROLOGIC: [X ]No focal deficits  [ ]Follows commands  [ ]Does not follow commands  [ ]Cognitive impairment  [ ]Dysphagia  [ ]Dysarthria  [ ]Paresis   SKIN: [ ] Jaundiced [X ] Non-jaundiced [ ]Rash [ ]No Rash [ ] Warm [ ] Dry  MISC/LINES: [ ] ET tube [ ] Trach [ ]NGT/OGT [ ]PEG [ ]Bowden    CRITICAL CARE:  [ ] Shock Present  [ ]Septic [ ]Cardiogenic [ ]Neurologic [ ]Hypovolemic  [ ]  Vasopressors [ ]  Inotropes   [ ]Respiratory failure present [ ]Mechanical ventilation [ ]Non-invasive ventilatory support [ ]High flow  [ ]Acute  [ ]Chronic [ ]Hypoxic  [ ]Hypercarbic [ ]Other  [ ]Other organ failure     LABS: reviewed by me                           8.9    7.95  )-----------( 451      ( 18 Apr 2023 07:37 )             28.0     04-18    132<L>  |  97<L>  |  40<H>  ----------------------------<  105<H>  5.0   |  24  |  1.2    Ca    9.5      18 Apr 2023 07:37  Mg     1.7     04-18    RADIOLOGY & ADDITIONAL STUDIES: reviewed by me    CXR 4/14/23  Bilateral opacities, overall unchanged.    PROTEIN CALORIE MALNUTRITION PRESENT: [ ]mild [ ]moderate [ ]severe [ ]underweight [ ]morbid obesity  https://www.andeal.org/vault/2440/web/files/ONC/Table_Clinical%20Characteristics%20to%20Document%20Malnutrition-White%20JV%20et%20al%202012.pdf    Height (cm): 152.4 (04-03-23 @ 01:46), 155 (03-23-23 @ 11:02)  Weight (kg): 47.6 (04-03-23 @ 01:46), 43.1 (03-22-23 @ 13:03)  BMI (kg/m2): 20.5 (04-03-23 @ 01:46), 17.9 (03-23-23 @ 11:02)    [ ]PPSV2 < or = to 30% [ ]significant weight loss  [ ]poor nutritional intake  [ ]anasarca      [ ]Artificial Nutrition      REFERRALS:   [ ]Chaplaincy  [ ]Hospice  [ ]Child Life  [ ]Social Work  [ ]Case management [ ]Holistic Therapy     Palliative care education provided to patient and/or family    Goals of Care Document:     ______________  James Mayfield MD  Palliative Medicine  [facility]   of Geriatric and Palliative Medicine  [phone]

## 2023-04-18 NOTE — PROGRESS NOTE ADULT - PROBLEM SELECTOR PLAN 5
- d/w daughter, who is interested in hospice at [facility]  - hospice consulted and will d/w daughter
- d/w daughter, who is interested in hospice at [facility]  - hospice consulted and will d/w daughter

## 2023-04-18 NOTE — PROGRESS NOTE ADULT - ASSESSMENT
· Assessment	  89 year old female with PMHx of HTN, DM, CKD, active smoker, COPD not on home O2 recent admission for aspiration PNA with incidental finding of distal DVT presented to the ED for hypoxia    #Acute Hypoxic respiratory failure / multifactorial dyspnea/ suspected PNA/ h/o diastolic CHF/ MS / h/o COPD in active smoker / h/o distal DVT ( 2 months ago)   - CT angio: no PE but B/L lower lobe and R middle lobe consolidations   - recent V/Q scan has low probability of PE   - RVP, strep, legionella, bcx and MRSA negative   - procal 0.14  - s/p 7d course of cefepime and levaquin, completed 4/9, no more Abx recommended by ID   - prednisone taper complete on 4/12  - fluid restriction 1200 ml in 24 hours   - intake and output monitoring daily weight   - incentive spirometry   - supportive care   - maintain fluid balance  - monitor pulse Ox, taper off oxygen if tolerated     # Dyspepsia/ Peptic Ulcer Disease/Hiatal hernia/ constipation   - GI consult: high risk for endoscopy, c/w conservative management  - bowel regimen miralax, senna, tap water enema q8 prn, lactulose prn   - d PPI  IV BID, c/w carafate 1g BID  - mesenteric doppler is negative for stenosis   - tramadol for pain control  - received  Golytely, having multiple BMs     #LLE distal DVT   - Duplex (3/24/23) noted thrombus in left gastrocnemius and peroneal veins  - repeat duplex unchanged, was seen by vascular on recent admission  - was started on Eliquis given pt is not ambulating and remains on bed but pt has hx of PUD, and c/o of intermittent epigastric burn/ discomfort, would be at risk of GIB, daughter Meena Powell (at bedside) decided to hold on AC  - repeat duplex is negative for DVT, V/Q scan : low probability for PE, d/c therapeutic Lovenox, start prophylactic dose     # hyponatremia   - stable  - HCTZ on hold    #CKD III  - cr at baseline  - keep off lisinopril   - maintain fluid balance     #HTN  - c/w  amlodipine 2.5 mg daily  - holding HCTZ and lisinopril     #DM  - takes pioglitazone 15mg daily  - c/w insulin lantus 10U at bedtime, lispro 3U TID; monitor FS  - A1c 7.4% (3/2023)    DVT pp: cont  DNR DNI, consulted by hospice       #Progress Note Handoff  Pending (specify): anticipate discharge with hospice in 24 hours   Family discussion: I spoke with pt and her daughters at the bedside at length on 4/16,, they agreed with a plan of care   Disposition: Home___/SNF___/Other________/Unknown at this time___x _____

## 2023-04-18 NOTE — PROGRESS NOTE ADULT - SUBJECTIVE AND OBJECTIVE BOX
89 year old female with PMHx of HTN, DM, CKD, active smoker, COPD not on home O2 recent admission for aspiration PNA with incidental finding of distal DVT presented to the ED for hypoxia.  In last few day pt was c/o abdominal pain due to constipation / fecal impaction, mesenteric Doppler and V/Q rod were negative.   Today pt is comfortable, denies abdominal pain, had multiple BMs in last 48 hours, has no specific complaints.       REVIEW OF SYSTEMS:  Negative except as noted above.     VITALS:  T(C): 36.3 (18 Apr 2023 14:00), Max: 36.9 (17 Apr 2023 20:00)  T(F): 97.4 (18 Apr 2023 14:00), Max: 98.4 (17 Apr 2023 20:00)  HR: 91 (18 Apr 2023 14:00) (74 - 99)  BP: 142/71 (18 Apr 2023 14:00) (135/63 - 145/70)  BP(mean): --  RR: 19 (18 Apr 2023 14:00) (18 - 20)  SpO2: 98% (18 Apr 2023 14:00) (97% - 98%)    Parameters below as of 17 Apr 2023 20:00  Patient On (Oxygen Delivery Method): nasal cannula  O2 Flow (L/min): 2        HEIGHT/WEIGHT/BMI:   Height (cm): 152.4 (04-03), 155 (03-23)  Weight (kg): 47.6 (04-03), 43.1 (03-22)  BMI (kg/m2): 20.5 (04-03), 17.9 (03-23)    PHYSICAL EXAM:   GENERAL: NAD, lying in bed comfortably, underweight , frail female with temporal muscle waisting   NERVOUS SYSTEM:  Alert & Oriented X3, speech clear. No deficits, hard of hearing    HEAD:  Atraumatic, Normocephalic  EYES: EOMI, conjunctiva and sclera clear  ENT: Moist mucous membranes  NECK: Supple, No JVD  CHEST/LUNG: decreased BS at bases   HEART: Regular rate and rhythm  ABDOMEN: Bowel sounds present; Soft, diffuse tenderness on deep palpation, no rebound, no guarding,  Nondistended  EXTREMITIES: No clubbing, cyanosis, or edema  MSK: FROM all 4 extremities, full and equal strength  SKIN: No rashes or lesions      LABS:                                    8.9    7.95  )-----------( 451      ( 18 Apr 2023 07:37 )             28.0   04-18    132<L>  |  97<L>  |  40<H>  ----------------------------<  105<H>  5.0   |  24  |  1.2    Ca    9.5      18 Apr 2023 07:37  Mg     1.7     04-18    TPro  6.2  /  Alb  2.8<L>  /  TBili  0.3  /  DBili  x   /  AST  12  /  ALT  13  /  AlkPhos  174<H>  04-17      Culture - Blood (04.03.23 @ 05:55)   Specimen Source: .Blood Blood-Venous  Culture Results:   No Growth FinalCulture - Urine (04.03.23 @ 05:15)   Specimen Source: Clean Catch Clean Catch (Midstream)  Culture Results:   >=3 organisms. Probable collection contamination.    DIET:   Diet, Soft and Bite Sized:   Consistent Carbohydrate No Snacks  DASH/TLC Sodium & Cholesterol Restricted (04-03-23 @ 08:44) [Active]        RADIOLOGY:  < from: [facility] Pulmonary Ventilation/Perfusion Scan (04.16.23 @ 11:58) >    IMPRESSION:    LOW PROBABILITY FOR PULMONARY EMBOLISM.    SEGMENTAL IN CHARACTER PERFUSION DEFECTS IN THE BILATERAL LOWER LOBES   COMPLETELY MATCHED BY VENTILATION SCAN ABNORMALITIES.    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    < end of copied text >  < from: Xray Chest 1 View-PORTABLE IMMEDIATE (Xray Chest 1 View-PORTABLE IMMEDIATE .) (04.14.23 @ 12:06) >  Impression:    Bilateral opacities, overall unchanged.    < end of copied text >  < from: Xray Kidney Ureter Bladder (04.11.23 @ 06:28) >    Findings/  impression:    Copious stool seen throughout the colon. Nonobstructive bowel gas   pattern. Degenerative changes. Suggestion of contrast within the stomach.    < end of copied text >  < from: TTE Echo Complete w/o Contrast w/ Doppler (04.07.23 @ 10:17) >  Summary:   1. Technically difficult study.   2. Normal left ventricular internal cavity size. Normal global left   ventricular systolic function. LV Ejection Fraction by Araujo's Method   with a biplane EF of 55 %. Grade I diastolic dysfunction.   3. Normal right ventricular size and function.   4.Moderate mitral stenosis. Mild mitral valve regurgitation.   5. Mild aortic valve stenosis.    MEDICATIONS  (STANDING):  albuterol/ipratropium for Nebulization 3 milliLiter(s) Nebulizer every 12 hours  amLODIPine   Tablet 2.5 milliGRAM(s) Oral daily  atorvastatin 20 milliGRAM(s) Oral at bedtime  budesonide  80 MICROgram(s)/formoterol 4.5 MICROgram(s) Inhaler 2 Puff(s) Inhalation two times a day  chlorhexidine 2% Cloths 1 Application(s) Topical <User Schedule>  dextrose 5%. 1000 milliLiter(s) (100 mL/Hr) IV Continuous <Continuous>  dextrose 5%. 1000 milliLiter(s) (50 mL/Hr) IV Continuous <Continuous>  dextrose 50% Injectable 25 Gram(s) IV Push once  dextrose 50% Injectable 25 Gram(s) IV Push once  dextrose 50% Injectable 12.5 Gram(s) IV Push once  folic acid 1 milliGRAM(s) Oral daily  glucagon  Injectable 1 milliGRAM(s) IntraMuscular once  heparin   Injectable 5000 Unit(s) SubCutaneous every 12 hours  insulin glargine Injectable (LANTUS) 10 Unit(s) SubCutaneous every morning  insulin lispro (ADMELOG) corrective regimen sliding scale   SubCutaneous at bedtime  insulin lispro (ADMELOG) corrective regimen sliding scale   SubCutaneous three times a day before meals  insulin lispro Injectable (ADMELOG) 3 Unit(s) SubCutaneous three times a day before meals  magnesium sulfate  IVPB 2 Gram(s) IV Intermittent once  melatonin 5 milliGRAM(s) Oral at bedtime  pantoprazole    Tablet 40 milliGRAM(s) Oral two times a day  polyethylene glycol 3350 17 Gram(s) Oral <User Schedule>  senna 2 Tablet(s) Oral every 12 hours  sodium zirconium cyclosilicate 10 Gram(s) Oral once  sucralfate 1 Gram(s) Oral two times a day    MEDICATIONS  (PRN):  acetaminophen     Tablet .. 650 milliGRAM(s) Oral every 6 hours PRN Moderate Pain (4 - 6)  albuterol/ipratropium for Nebulization 3 milliLiter(s) Nebulizer every 4 hours PRN Shortness of Breath and/or Wheezing  aluminum hydroxide/magnesium hydroxide/simethicone Suspension 30 milliLiter(s) Oral every 4 hours PRN Dyspepsia  dextrose Oral Gel 15 Gram(s) Oral once PRN Blood Glucose LESS THAN 70 milliGRAM(s)/deciliter  lactulose Syrup 10 Gram(s) Oral every 12 hours PRN constipation  traMADol 25 milliGRAM(s) Oral every 8 hours PRN Severe Pain (7 - 10)

## 2023-04-18 NOTE — PROGRESS NOTE ADULT - ASSESSMENT
DNR DNI 89 y/o woman w PMHx of HTN, HLD, DM, HFpEF, mitral stenosis, COPD, PUD, CKD3b w baseline Cr ~1.4, prior distal LLE DVT, w AC held 2/2 h/o PUD, recent admission for aspiration PNA and GERD (EGD not done 2/2 high risk), d/c on cefpodoxime, referred from [facility] for CP, SOB, hypoxia w SaO2 85% on RA, admitted for hypoxic respiratory failure.   While admitted, was treated w 7d course Cefepime/Levaquin completed 4/9/23 and completed prednisone taper 4/12/23. Noted to have abd pain 2/2 constipation/fecal impaction w mesenteric doppler and VQ scan neg.     #Acute Hypoxic respiratory failure / multifactorial dyspnea  #suspected PNA - treated   #diastolic CHF/ MS   #COPD in active smoker   - CT angio: no PE but B/L lower lobe and R middle lobe consolidations, recent V/Q scan has low probability of PE   - RVP, strep, legionella, bcx and MRSA negative, procal 0.14  - s/p 7d course of cefepime and levaquin, completed 4/9, no more Abx recommended by ID   - prednisone taper complete on 4/12/23  - start fluid restriction 1200 ml in 24 hours   - I&O monitoring, daily weight, incentive spirometry, maintain fluid balance  - supportive care, monitor pulse Ox, taper off oxygen if tolerated w SpO2 goal 88-92%     # Dyspepsia/ Peptic Ulcer Disease/Hiatal hernia  # Constipation - resolved   - GI consult: high risk for endoscopy, c/w conservative management   - Cont PPI IV BID, c/w carafate 1g BID  - mesenteric doppler is negative for stenosis   - tramadol for pain control  - bowel regimen miralax, senna, tap water enema q8 prn, lactulose prn  - Received Golytely 1L for severe constipation - 4/17/23 passing BMs     #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   - HCTZ on hold  - 4/17/23 d/c salt restriction on diet     #CKD3b   - Cr baseline ~1.4, currently at baseline  - holding lisinopril     #HTN  - c/w amlodipine 2.5 mg daily  - holding HCTZ and lisinopril     #DM  - takes pioglitazone 15mg daily, A1c 7.4% (3/2023)  - c/w insulin lantus 10U at bedtime, lispro 3U TID; monitor FS                                                                              ----------------------------------------------------  # DVT prophylaxis: Lovenox 50mg q12h   # GI prophylaxis: Pantoprazole 40mg BID   # Diet: Soft bite sized CC Low fat 1200cc +glucerna  # Activity: IAT   # Code status: DNR DNI   # Disposition: DC w hospice to [facility]                                                                            --------------------------------------------------------    #HANDOFF  - Follow hyponatremia, Hb   - DC w hospice to [facility]  DNR DNI 89 y/o woman w PMHx of HTN, HLD, DM, HFpEF, mitral stenosis, COPD, PUD, CKD3b w baseline Cr ~1.4, prior distal LLE DVT, w AC held 2/2 h/o PUD, recent admission for aspiration PNA and GERD (EGD not done 2/2 high risk), d/c on cefpodoxime, referred from [facility] for CP, SOB, hypoxia w SaO2 85% on RA, admitted for hypoxic respiratory failure.   While admitted, was treated w 7d course Cefepime/Levaquin completed 4/9/23 and completed prednisone taper 4/12/23. Noted to have abd pain 2/2 constipation/fecal impaction w mesenteric doppler and VQ scan neg.   Constipation resolved w aggressive bowel regimen including Golytely.     #Acute Hypoxic respiratory failure / multifactorial dyspnea  #suspected PNA - treated w 7d cefepime/levaquin  #diastolic CHF/ MS   #COPD in active smoker   - CT angio: no PE but B/L lower lobe and R middle lobe consolidations, recent V/Q scan has low probability of PE   - RVP, strep, legionella, bcx and MRSA negative, procal 0.14  - s/p 7d course of cefepime and levaquin, completed 4/9, no more Abx recommended by ID   - prednisone taper complete on 4/12/23  - started fluid restriction 1200 ml  - I&O monitoring, daily weight, incentive spirometry, maintain fluid balance  - supportive care, monitor pulse Ox, taper off oxygen if tolerated w SpO2 goal 88-92%     # Dyspepsia/ Peptic Ulcer Disease/Hiatal hernia  # Constipation - resolved   - GI consult: high risk for endoscopy, c/w conservative management   - Cont PPI IV BID, c/w carafate 1g BID  - mesenteric doppler is negative for stenosis   - tramadol for pain control   - bowel regimen miralax, senna, tap water enema q8 prn, lactulose prn  - Received Golytely 1L for severe constipation - 4/17/23 passing BMs     #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5 mg daily  - holding HCTZ and lisinopril     #DM  - takes pioglitazone 15mg daily, A1c 7.4% (3/2023)  - c/w insulin lantus 10U at bedtime, lispro 3U TID; monitor FS                                                                              ----------------------------------------------------  # DVT prophylaxis: Lovenox 50mg q12h   # GI prophylaxis: Pantoprazole 40mg BID   # Diet: Soft bite sized CC Low fat 1200cc +glucerna  # Activity: IAT   # Code status: DNR DNI   # Disposition: DC w hospice to [facility]                                                                            --------------------------------------------------------    #HANDOFF  - Follow hyponatremia, Hb   - DC w hospice to [facility]  DNR DNI 89 y/o woman w PMHx of HTN, HLD, DM, HFpEF, mitral stenosis, COPD, PUD, CKD3b w baseline Cr ~1.4, prior distal LLE DVT, w AC held 2/2 h/o PUD, recent admission for aspiration PNA and GERD (EGD not done 2/2 high risk), d/c on cefpodoxime, referred from [facility] for CP, SOB, hypoxia w SaO2 85% on RA, admitted for hypoxic respiratory failure.   While admitted, was treated w 7d course Cefepime/Levaquin completed 4/9/23 and completed prednisone taper 4/12/23. Noted to have abd pain 2/2 constipation/fecal impaction w mesenteric doppler and VQ scan neg.   Constipation resolved w aggressive bowel regimen including Golytely.     #Acute Hypoxic respiratory failure / multifactorial dyspnea  #suspected PNA - treated w 7d cefepime/levaquin  #diastolic CHF/ MS   #COPD in active smoker   - CT angio: no PE but B/L lower lobe and R middle lobe consolidations, recent V/Q scan has low probability of PE   - RVP, strep, legionella, bcx and MRSA negative, procal 0.14  - s/p 7d course of cefepime and levaquin, completed 4/9, no more Abx recommended by ID   - prednisone taper complete on 4/12/23  - started fluid restriction 1200 ml  - I&O monitoring, daily weight, incentive spirometry, maintain fluid balance  - supportive care, monitor pulse Ox, taper off oxygen if tolerated w SpO2 goal 88-92%     # Dyspepsia/ Peptic Ulcer Disease/Hiatal hernia  # Constipation - resolved   - GI consult: high risk for endoscopy, c/w conservative management   - Cont PPI IV BID, c/w carafate 1g BID  - mesenteric doppler is negative for stenosis   - tramadol for pain control   - bowel regimen miralax, senna, tap water enema q8 prn, lactulose prn  - Received Golytely 1L for severe constipation - 4/17/23 passing BMs     #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 Currently only on ppx dose heparin.     # hyponatremia   - stable low 130s   - HCTZ on hold  - 4/17/23 d/c salt restriction on diet     #CKD3b   - Cr baseline ~1.4, currently at baseline  - holding lisinopril     #HTN  - c/w amlodipine 2.5 mg daily  - holding HCTZ and lisinopril     #DM  - takes pioglitazone 15mg daily, A1c 7.4% (3/2023)  - c/w insulin lantus 10U at bedtime, lispro 3U TID; monitor FS                                                                              ----------------------------------------------------  # DVT prophylaxis: Heparin 5000u SQ QD   # GI prophylaxis: Pantoprazole 40mg BID   # Diet: Soft bite sized CC Low fat 1200cc +glucerna  # Activity: IAT   # Code status: DNR DNI   # Disposition: DC w hospice to [facility]                                                                            --------------------------------------------------------    #HANDOFF  - Follow hyponatremia, Hb   - DC w hospice to [facility]

## 2023-04-19 VITALS
SYSTOLIC BLOOD PRESSURE: 143 MMHG | DIASTOLIC BLOOD PRESSURE: 73 MMHG | RESPIRATION RATE: 18 BRPM | TEMPERATURE: 97 F | HEART RATE: 98 BPM | OXYGEN SATURATION: 97 %

## 2023-04-19 LAB
ANION GAP SERPL CALC-SCNC: 13 MMOL/L — SIGNIFICANT CHANGE UP (ref 7–14)
BUN SERPL-MCNC: 42 MG/DL — HIGH (ref 10–20)
CALCIUM SERPL-MCNC: 9.2 MG/DL — SIGNIFICANT CHANGE UP (ref 8.4–10.5)
CHLORIDE SERPL-SCNC: 97 MMOL/L — LOW (ref 98–110)
CO2 SERPL-SCNC: 23 MMOL/L — SIGNIFICANT CHANGE UP (ref 17–32)
CREAT SERPL-MCNC: 1.2 MG/DL — SIGNIFICANT CHANGE UP (ref 0.7–1.5)
EGFR: 43 ML/MIN/1.73M2 — LOW
GLUCOSE BLDC GLUCOMTR-MCNC: 132 MG/DL — HIGH (ref 70–99)
GLUCOSE BLDC GLUCOMTR-MCNC: 172 MG/DL — HIGH (ref 70–99)
GLUCOSE SERPL-MCNC: 129 MG/DL — HIGH (ref 70–99)
HCT VFR BLD CALC: 26.6 % — LOW (ref 37–47)
HGB BLD-MCNC: 8.4 G/DL — LOW (ref 12–16)
MAGNESIUM SERPL-MCNC: 1.7 MG/DL — LOW (ref 1.8–2.4)
MCHC RBC-ENTMCNC: 27.9 PG — SIGNIFICANT CHANGE UP (ref 27–31)
MCHC RBC-ENTMCNC: 31.6 G/DL — LOW (ref 32–37)
MCV RBC AUTO: 88.4 FL — SIGNIFICANT CHANGE UP (ref 81–99)
NRBC # BLD: 0 /100 WBCS — SIGNIFICANT CHANGE UP (ref 0–0)
PLATELET # BLD AUTO: 463 K/UL — HIGH (ref 130–400)
PMV BLD: 9.8 FL — SIGNIFICANT CHANGE UP (ref 7.4–10.4)
POTASSIUM SERPL-MCNC: 5.1 MMOL/L — HIGH (ref 3.5–5)
POTASSIUM SERPL-SCNC: 5.1 MMOL/L — HIGH (ref 3.5–5)
RBC # BLD: 3.01 M/UL — LOW (ref 4.2–5.4)
RBC # FLD: 16.2 % — HIGH (ref 11.5–14.5)
SODIUM SERPL-SCNC: 133 MMOL/L — LOW (ref 135–146)
WBC # BLD: 7.98 K/UL — SIGNIFICANT CHANGE UP (ref 4.8–10.8)
WBC # FLD AUTO: 7.98 K/UL — SIGNIFICANT CHANGE UP (ref 4.8–10.8)

## 2023-04-19 PROCEDURE — 99239 HOSP IP/OBS DSCHRG MGMT >30: CPT

## 2023-04-19 RX ORDER — SODIUM ZIRCONIUM CYCLOSILICATE 10 G/10G
5 POWDER, FOR SUSPENSION ORAL ONCE
Refills: 0 | Status: COMPLETED | OUTPATIENT
Start: 2023-04-19 | End: 2023-04-19

## 2023-04-19 RX ORDER — MAGNESIUM SULFATE 500 MG/ML
2 VIAL (ML) INJECTION ONCE
Refills: 0 | Status: COMPLETED | OUTPATIENT
Start: 2023-04-19 | End: 2023-04-19

## 2023-04-19 RX ORDER — LIDOCAINE 4 G/100G
1 CREAM TOPICAL DAILY
Refills: 0 | Status: DISCONTINUED | OUTPATIENT
Start: 2023-04-19 | End: 2023-04-19

## 2023-04-19 RX ADMIN — Medication 3 MILLILITER(S): at 09:09

## 2023-04-19 RX ADMIN — Medication 3 MILLILITER(S): at 14:09

## 2023-04-19 RX ADMIN — Medication 650 MILLIGRAM(S): at 00:30

## 2023-04-19 RX ADMIN — LIDOCAINE 1 PATCH: 4 CREAM TOPICAL at 13:13

## 2023-04-19 RX ADMIN — INSULIN GLARGINE 10 UNIT(S): 100 INJECTION, SOLUTION SUBCUTANEOUS at 07:45

## 2023-04-19 RX ADMIN — Medication 25 GRAM(S): at 13:12

## 2023-04-19 RX ADMIN — POLYETHYLENE GLYCOL 3350 17 GRAM(S): 17 POWDER, FOR SOLUTION ORAL at 06:38

## 2023-04-19 RX ADMIN — PANTOPRAZOLE SODIUM 40 MILLIGRAM(S): 20 TABLET, DELAYED RELEASE ORAL at 06:39

## 2023-04-19 RX ADMIN — BUDESONIDE AND FORMOTEROL FUMARATE DIHYDRATE 2 PUFF(S): 160; 4.5 AEROSOL RESPIRATORY (INHALATION) at 07:46

## 2023-04-19 RX ADMIN — Medication 1 MILLIGRAM(S): at 11:48

## 2023-04-19 RX ADMIN — CHLORHEXIDINE GLUCONATE 1 APPLICATION(S): 213 SOLUTION TOPICAL at 06:57

## 2023-04-19 RX ADMIN — Medication 3 UNIT(S): at 07:42

## 2023-04-19 RX ADMIN — AMLODIPINE BESYLATE 2.5 MILLIGRAM(S): 2.5 TABLET ORAL at 06:38

## 2023-04-19 RX ADMIN — HEPARIN SODIUM 5000 UNIT(S): 5000 INJECTION INTRAVENOUS; SUBCUTANEOUS at 06:39

## 2023-04-19 RX ADMIN — TRAMADOL HYDROCHLORIDE 25 MILLIGRAM(S): 50 TABLET ORAL at 14:20

## 2023-04-19 RX ADMIN — Medication 1 GRAM(S): at 06:38

## 2023-04-19 RX ADMIN — SODIUM ZIRCONIUM CYCLOSILICATE 5 GRAM(S): 10 POWDER, FOR SUSPENSION ORAL at 13:13

## 2023-04-19 RX ADMIN — SENNA PLUS 2 TABLET(S): 8.6 TABLET ORAL at 06:38

## 2023-04-19 RX ADMIN — POLYETHYLENE GLYCOL 3350 17 GRAM(S): 17 POWDER, FOR SOLUTION ORAL at 13:06

## 2023-04-19 NOTE — PROGRESS NOTE ADULT - ASSESSMENT
DNR DNI 89 y/o woman w PMHx of HTN, HLD, DM, HFpEF, mitral stenosis, COPD, PUD, CKD3b w baseline Cr ~1.4, prior distal LLE DVT, w AC held 2/2 h/o PUD, recent admission for aspiration PNA and GERD (EGD not done 2/2 high risk), d/c on cefpodoxime, referred from [facility] for CP, SOB, hypoxia w SaO2 85% on RA, admitted for hypoxic respiratory failure.   While admitted, was treated w 7d course Cefepime/Levaquin completed 4/9/23 and completed prednisone taper 4/12/23. Noted to have abd pain 2/2 constipation/fecal impaction w mesenteric doppler and VQ scan neg.   Constipation resolved w aggressive bowel regimen including Golytely.     #Acute Hypoxic respiratory failure / multifactorial dyspnea  #suspected PNA - treated w 7d cefepime/levaquin  #diastolic CHF/ MS   #COPD in active smoker   - CT angio: no PE but B/L lower lobe and R middle lobe consolidations, recent V/Q scan has low probability of PE   - RVP, strep, legionella, bcx and MRSA negative, procal 0.14  - s/p 7d course of cefepime and levaquin, completed 4/9, no more Abx recommended by ID   - prednisone taper complete on 4/12/23  - started fluid restriction 1200 ml  - I&O monitoring, daily weight, incentive spirometry, maintain fluid balance  - supportive care, monitor pulse Ox, taper off oxygen if tolerated w SpO2 goal 88-92%     # Dyspepsia/ Peptic Ulcer Disease/Hiatal hernia  # Constipation - resolved   - GI consult: high risk for endoscopy, c/w conservative management   - Cont PPI IV BID, c/w carafate 1g BID  - mesenteric doppler is negative for stenosis   - tramadol for pain control   - bowel regimen miralax, senna, tap water enema q8 prn, lactulose prn  - Received Golytely 1L for severe constipation - 4/17/23 passing BMs     #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 Currently only on ppx dose heparin.     # hyponatremia   - stable low 130s   - HCTZ on hold  - 4/17/23 d/c salt restriction on diet     #CKD3b   - Cr baseline ~1.4, currently at baseline  - holding lisinopril     #HTN  - c/w amlodipine 2.5 mg daily  - holding HCTZ and lisinopril     #DM  - takes pioglitazone 15mg daily, A1c 7.4% (3/2023)  - c/w insulin lantus 10U at bedtime, lispro 3U TID; monitor FS                                                                              ----------------------------------------------------  # DVT prophylaxis: Heparin 5000u SQ QD   # GI prophylaxis: Pantoprazole 40mg BID   # Diet: Soft bite sized CC Low fat 1200cc +glucerna  # Activity: IAT   # Code status: DNR DNI   # Disposition: [facility] to [facility]                                                                            --------------------------------------------------------    #HANDOFF  - [facility] to [facility]  DNR DNI 89 y/o woman w PMHx of HTN, HLD, DM, HFpEF, mitral stenosis, COPD, PUD, CKD3b w baseline Cr ~1.4, prior distal LLE DVT, w AC held 2/2 h/o PUD, recent admission for aspiration PNA and GERD (EGD not done 2/2 high risk), d/c on cefpodoxime, referred from [facility] for CP, SOB, hypoxia w SaO2 85% on RA, admitted for hypoxic respiratory failure.   While admitted, was treated w 7d course Cefepime/Levaquin completed 4/9/23 and completed prednisone taper 4/12/23. Noted to have abd pain 2/2 constipation/fecal impaction w mesenteric doppler and VQ scan neg.   Constipation resolved w aggressive bowel regimen including Golytely.     #Acute Hypoxic respiratory failure / multifactorial dyspnea  #suspected PNA - treated w 7d cefepime/levaquin  #diastolic CHF/ MS   #COPD in active smoker   - CT angio: no PE but B/L lower lobe and R middle lobe consolidations, recent V/Q scan has low probability of PE   - RVP, strep, legionella, bcx and MRSA negative, procal 0.14  - s/p 7d course of cefepime and levaquin, completed 4/9, no more Abx recommended by ID   - prednisone taper complete on 4/12/23  - started fluid restriction 1200 ml  - I&O monitoring, daily weight, incentive spirometry, maintain fluid balance  - supportive care, monitor pulse Ox, taper off oxygen if tolerated w SpO2 goal 88-92%     # Dyspepsia/ Peptic Ulcer Disease/Hiatal hernia  # Constipation - resolved   - GI consult: high risk for endoscopy, c/w conservative management   - Cont PPI IV BID, c/w carafate 1g BID  - mesenteric doppler is negative for stenosis   - tramadol for pain control   - bowel regimen miralax, senna, tap water enema q8 prn, lactulose prn  - Received Golytely 1L for severe constipation - 4/17/23 passing BMs     #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 Currently only on ppx dose heparin.     # hyponatremia   - stable low 130s   - HCTZ on hold  - 4/17/23 d/c salt restriction on diet     #CKD3b   - Cr baseline ~1.4, currently at baseline  - holding lisinopril     #HTN  - c/w amlodipine 2.5 mg daily  - holding HCTZ and lisinopril     #DM  - takes pioglitazone 15mg daily, A1c 7.4% (3/2023)  - c/w insulin lantus 10U at bedtime, lispro 3U TID; monitor FS                                                                              ----------------------------------------------------  # DVT prophylaxis: Heparin 5000u SQ QD   # GI prophylaxis: Pantoprazole 40mg BID   # Diet: Soft bite sized CC Low fat 1200cc +glucerna  # Activity: IAT   # Code status: DNR DNI   # Disposition: [facility] to [facility]                                                                            --------------------------------------------------------    #HANDOFF  - [facility] to [facility]

## 2023-04-19 NOTE — DISCHARGE NOTE NURSING/CASE MANAGEMENT/SOCIAL WORK - NSDCPEFALRISK_GEN_ALL_CORE
For information on Fall & Injury Prevention, visit: [email or web address] OR  [email or web address] OR  https://www.cdc.gov/steadi/patient.html

## 2023-04-19 NOTE — PROGRESS NOTE ADULT - PROVIDER SPECIALTY LIST ADULT
Gastroenterology
Hospitalist
Internal Medicine
Internal Medicine
Pulmonology
Pulmonology
Gastroenterology
Hospitalist
Internal Medicine
Hospitalist
Hospitalist
Internal Medicine
Palliative Care
Pulmonology
Hospitalist
Internal Medicine
Palliative Care

## 2023-04-19 NOTE — DISCHARGE NOTE NURSING/CASE MANAGEMENT/SOCIAL WORK - PATIENT PORTAL LINK FT
You can access the FollowMyHealth Patient Portal offered by [facility] by registering at the following website: [email or web address]. By joining [employer]’s FollowMyHealth portal, you will also be able to view your health information using other applications (apps) compatible with our system.

## 2023-04-19 NOTE — HOSPICE CARE NOTE - CONVESATION DETAILS
02 pending delivery this morning to [facility]. Patient to be D/C back to facility this afternoon. [facility] to admit to services at facility Thursday morning. 
Received phone call from patient's daughter Paige who is requesting that her mom receive hospice services  at [facility] where she resides. Oxygen to be delivered tomorrow morning, please discharge patient tomorrow afternoon and hospice admission is set for Thursday morning. 
Hospice referral has been received via care port. Call was placed to patients Daughter Chanelle to discuss hospice services/ philosophy. Voice Mail was left pending a   return call. Hospice to remain available and follow up with Medical team once Hospice has communicated with family.

## 2023-04-19 NOTE — PROGRESS NOTE ADULT - SUBJECTIVE AND OBJECTIVE BOX
RAUL LYNN 89y Female  MRN#: [id number]   CODE STATUS: DNR DNI     Admission Date: 04-03-23  Hospital Day: 16d    Pt is currently admitted with the primary diagnosis of hypoxic respiratory failure     SUBJECTIVE  Hospital Course  DNR DNI 89 y/o woman w PMHx of HTN, HLD, DM, HFpEF, mitral stenosis, COPD, PUD, CKD3b w baseline Cr ~1.4, prior distal LLE DVT, w AC held 2/2 h/o PUD, recent admission for aspiration PNA and GERD (EGD not done 2/2 high risk), d/c on cefpodoxime, referred from [facility] for CP, SOB, hypoxia w SaO2 85% on RA, admitted for hypoxic respiratory failure.   While admitted, was treated w 7d course Cefepime/Levaquin completed 4/9/23 and completed prednisone taper 4/12/23. Noted to have abd pain 2/2 constipation/fecal impaction w mesenteric doppler and VQ scan neg. Constipation resolved w aggressive bowel regimen including Golytely.     4/18/23 day 15: Anticipating DC 4/19/23 4/19/23 day 16: Anticipating DC 4/19/23       Admission H&P   89 yold female to [facility] Htn, Hld, Ckd, emphysema, gerd, Dm; pt biba from [facility] for difficulty breathing, coughing,chest pain and hypoxia; pt currently not home O2 dep - noted to have PO RA 85 at facility; pt s/p recently admission for chronic pneumonia and chronic abdominal pain(gerd); endoscopy no done in hospital due to high morbidity; pt currenlty also on abx cefpidoxime at nursing home;  pt deneis fever, chills, sore throat, difficutly swallowing abdominal pain or urinary sx;    Overnight events  None significant       Subjective complaints  Still reporting that she is being given ma&cheese w carrots for breakfast. I suspect this is not the case.                PHYSICAL EXAM:  GENERAL: NAD, lying in bed comfortably, thin   HEAD:  Atraumatic, Normocephalic  EYES: EOMI, sclera clear  ENT: Moist mucous membranes. (+)rhonchorous cough intermittently. Hearing aid at bedside.   NECK: Supple, trachea midline  CHEST/LUNG: Clear to auscultation anteriorly bilaterally after cough; No significant rales, rhonchi, wheezing, or rubs. Unlabored respirations. Transmitted upper airway sounds.   HEART: Regular rate and rhythm; No appreciable murmurs, rubs, or gallops  ABDOMEN: (+)BS; Soft, nontender, nondistended  EXTREMITIES:  Multiple bruises BUE, no skin tears. 2+ Radial Pulses, brisk capillary refill. No clubbing, cyanosis, or edema. Toenails painted purple.   NERVOUS SYSTEM:  A&Ox3, no noted facial droop. Moving all extremities w/o difficulty.   SKIN: No rashes or lesions to b/l forearm, face.         Present Today:   - Bowden:  No [ X ], Yes [  ] : Indication:   - Type of IV Access:       .. CVC/Piccline:  No [ X ], Yes [  ] : Indication:       .. Midline: No [ X ], Yes [  ] : Indication:           T(C): 35.8 (04-19-23 @ 04:47)  T(F): 96.5 (04-19-23 @ 04:47)  HR: 96 (04-19-23 @ 04:47)  BP: 140/71 (04-19-23 @ 04:47)  RR: 18 (04-19-23 @ 04:47)  SpO2: 97% (04-19-23 @ 04:47)                                                   OBJECTIVE  PAST MEDICAL & SURGICAL HISTORY  Hypertension    Diabetes mellitus    Chronic kidney disease, unspecified CKD stage                                                ALLERGIES:  penicillin (Unknown)                           HOME MEDICATIONS  Home Medications:                           MEDICATIONS:  STANDING MEDICATIONS  albuterol/ipratropium for Nebulization 3 milliLiter(s) Nebulizer every 12 hours  amLODIPine   Tablet 2.5 milliGRAM(s) Oral daily  atorvastatin 20 milliGRAM(s) Oral at bedtime  budesonide  80 MICROgram(s)/formoterol 4.5 MICROgram(s) Inhaler 2 Puff(s) Inhalation two times a day  chlorhexidine 2% Cloths 1 Application(s) Topical <User Schedule>  dextrose 5%. 1000 milliLiter(s) IV Continuous <Continuous>  dextrose 5%. 1000 milliLiter(s) IV Continuous <Continuous>  dextrose 50% Injectable 25 Gram(s) IV Push once  dextrose 50% Injectable 12.5 Gram(s) IV Push once  dextrose 50% Injectable 25 Gram(s) IV Push once  folic acid 1 milliGRAM(s) Oral daily  glucagon  Injectable 1 milliGRAM(s) IntraMuscular once  heparin   Injectable 5000 Unit(s) SubCutaneous every 12 hours  insulin glargine Injectable (LANTUS) 10 Unit(s) SubCutaneous every morning  insulin lispro (ADMELOG) corrective regimen sliding scale   SubCutaneous three times a day before meals  insulin lispro (ADMELOG) corrective regimen sliding scale   SubCutaneous at bedtime  insulin lispro Injectable (ADMELOG) 3 Unit(s) SubCutaneous three times a day before meals  magnesium sulfate  IVPB 2 Gram(s) IV Intermittent once  melatonin 5 milliGRAM(s) Oral at bedtime  pantoprazole    Tablet 40 milliGRAM(s) Oral two times a day  polyethylene glycol 3350 17 Gram(s) Oral <User Schedule>  senna 2 Tablet(s) Oral every 12 hours  sodium zirconium cyclosilicate 10 Gram(s) Oral once  sucralfate 1 Gram(s) Oral two times a day    PRN MEDICATIONS  acetaminophen     Tablet .. 650 milliGRAM(s) Oral every 6 hours PRN  albuterol/ipratropium for Nebulization 3 milliLiter(s) Nebulizer every 4 hours PRN  aluminum hydroxide/magnesium hydroxide/simethicone Suspension 30 milliLiter(s) Oral every 4 hours PRN  dextrose Oral Gel 15 Gram(s) Oral once PRN  lactulose Syrup 10 Gram(s) Oral every 12 hours PRN  traMADol 25 milliGRAM(s) Oral every 8 hours PRN                                            ------------------------------------------------------------  T(C): 35.8 (04-19-23 @ 04:47), Max: 36.3 (04-18-23 @ 14:00)  T(F): 96.5 (04-19-23 @ 04:47), Max: 97.4 (04-18-23 @ 14:00)  HR: 96 (04-19-23 @ 04:47) (91 - 111)  BP: 140/71 (04-19-23 @ 04:47) (140/71 - 142/71)  RR: 18 (04-19-23 @ 04:47) (18 - 20)  SpO2: 97% (04-19-23 @ 04:47) (95% - 98%)                                               LABS:                        8.9    7.95  )-----------( 451      ( 18 Apr 2023 07:37 )             28.0     04-18    132<L>  |  97<L>  |  40<H>  ----------------------------<  105<H>  5.0   |  24  |  1.2    Ca    9.5      18 Apr 2023 07:37  Mg     1.7     04-18    TPro  6.2  /  Alb  2.8<L>  /  TBili  0.3  /  DBili  x   /  AST  12  /  ALT  13  /  AlkPhos  174<H>  04-17                         RAUL LYNN 89y Female  MRN#: [id number]   CODE STATUS: DNR DNI     Admission Date: 04-03-23  Hospital Day: 16d    Pt is currently admitted with the primary diagnosis of hypoxic respiratory failure     SUBJECTIVE  Hospital Course  DNR DNI 89 y/o woman w PMHx of HTN, HLD, DM, HFpEF, mitral stenosis, COPD, PUD, CKD3b w baseline Cr ~1.4, prior distal LLE DVT, w AC held 2/2 h/o PUD, recent admission for aspiration PNA and GERD (EGD not done 2/2 high risk), d/c on cefpodoxime, referred from [facility] for CP, SOB, hypoxia w SaO2 85% on RA, admitted for hypoxic respiratory failure.   While admitted, was treated w 7d course Cefepime/Levaquin completed 4/9/23 and completed prednisone taper 4/12/23. Noted to have abd pain 2/2 constipation/fecal impaction w mesenteric doppler and VQ scan neg. Constipation resolved w aggressive bowel regimen including Golytely.     4/18/23 day 15: Anticipating DC 4/19/23 4/19/23 day 16: Anticipating DC 4/19/23       Admission H&P   89 yold female to [facility] Htn, Hld, Ckd, emphysema, gerd, Dm; pt biba from [facility] for difficulty breathing, coughing,chest pain and hypoxia; pt currently not home O2 dep - noted to have PO RA 85 at facility; pt s/p recently admission for chronic pneumonia and chronic abdominal pain(gerd); endoscopy no done in hospital due to high morbidity; pt currenlty also on abx cefpidoxime at nursing home;  pt deneis fever, chills, sore throat, difficutly swallowing abdominal pain or urinary sx;    Overnight events  None significant       Subjective complaints  Some L trapezius pain today             PHYSICAL EXAM:  GENERAL: NAD, lying in bed comfortably, thin   HEAD:  Atraumatic, Normocephalic  EYES: EOMI, sclera clear  ENT: Moist mucous membranes. (+)rhonchorous cough intermittently. Hearing aid at bedside.   NECK: Supple, trachea midline  CHEST/LUNG: Clear to auscultation anteriorly bilaterally after cough; No significant rales, rhonchi, wheezing, or rubs. Unlabored respirations. Transmitted upper airway sounds.   HEART: Regular rate and rhythm; No appreciable murmurs, rubs, or gallops  ABDOMEN: (+)BS; Soft, nontender, nondistended  MSKL: L trapezius muscle spasm   EXTREMITIES:  Multiple bruises BUE, no skin tears. 2+ Radial Pulses, brisk capillary refill. No clubbing, cyanosis, or edema. Toenails painted purple.   NERVOUS SYSTEM:  A&Ox3, no noted facial droop. Moving all extremities w/o difficulty.   SKIN: No rashes or lesions to b/l forearm, face.         Present Today:   - Bowden:  No [ X ], Yes [  ] : Indication:   - Type of IV Access:       .. CVC/Piccline:  No [ X ], Yes [  ] : Indication:       .. Midline: No [ X ], Yes [  ] : Indication:           T(C): 35.8 (04-19-23 @ 04:47)  T(F): 96.5 (04-19-23 @ 04:47)  HR: 96 (04-19-23 @ 04:47)  BP: 140/71 (04-19-23 @ 04:47)  RR: 18 (04-19-23 @ 04:47)  SpO2: 97% (04-19-23 @ 04:47)                                                   OBJECTIVE  PAST MEDICAL & SURGICAL HISTORY  Hypertension    Diabetes mellitus    Chronic kidney disease, unspecified CKD stage                                                ALLERGIES:  penicillin (Unknown)                           HOME MEDICATIONS  Home Medications:                           MEDICATIONS:  STANDING MEDICATIONS  albuterol/ipratropium for Nebulization 3 milliLiter(s) Nebulizer every 12 hours  amLODIPine   Tablet 2.5 milliGRAM(s) Oral daily  atorvastatin 20 milliGRAM(s) Oral at bedtime  budesonide  80 MICROgram(s)/formoterol 4.5 MICROgram(s) Inhaler 2 Puff(s) Inhalation two times a day  chlorhexidine 2% Cloths 1 Application(s) Topical <User Schedule>  dextrose 5%. 1000 milliLiter(s) IV Continuous <Continuous>  dextrose 5%. 1000 milliLiter(s) IV Continuous <Continuous>  dextrose 50% Injectable 25 Gram(s) IV Push once  dextrose 50% Injectable 12.5 Gram(s) IV Push once  dextrose 50% Injectable 25 Gram(s) IV Push once  folic acid 1 milliGRAM(s) Oral daily  glucagon  Injectable 1 milliGRAM(s) IntraMuscular once  heparin   Injectable 5000 Unit(s) SubCutaneous every 12 hours  insulin glargine Injectable (LANTUS) 10 Unit(s) SubCutaneous every morning  insulin lispro (ADMELOG) corrective regimen sliding scale   SubCutaneous three times a day before meals  insulin lispro (ADMELOG) corrective regimen sliding scale   SubCutaneous at bedtime  insulin lispro Injectable (ADMELOG) 3 Unit(s) SubCutaneous three times a day before meals  magnesium sulfate  IVPB 2 Gram(s) IV Intermittent once  melatonin 5 milliGRAM(s) Oral at bedtime  pantoprazole    Tablet 40 milliGRAM(s) Oral two times a day  polyethylene glycol 3350 17 Gram(s) Oral <User Schedule>  senna 2 Tablet(s) Oral every 12 hours  sodium zirconium cyclosilicate 10 Gram(s) Oral once  sucralfate 1 Gram(s) Oral two times a day    PRN MEDICATIONS  acetaminophen     Tablet .. 650 milliGRAM(s) Oral every 6 hours PRN  albuterol/ipratropium for Nebulization 3 milliLiter(s) Nebulizer every 4 hours PRN  aluminum hydroxide/magnesium hydroxide/simethicone Suspension 30 milliLiter(s) Oral every 4 hours PRN  dextrose Oral Gel 15 Gram(s) Oral once PRN  lactulose Syrup 10 Gram(s) Oral every 12 hours PRN  traMADol 25 milliGRAM(s) Oral every 8 hours PRN                                            ------------------------------------------------------------  T(C): 35.8 (04-19-23 @ 04:47), Max: 36.3 (04-18-23 @ 14:00)  T(F): 96.5 (04-19-23 @ 04:47), Max: 97.4 (04-18-23 @ 14:00)  HR: 96 (04-19-23 @ 04:47) (91 - 111)  BP: 140/71 (04-19-23 @ 04:47) (140/71 - 142/71)  RR: 18 (04-19-23 @ 04:47) (18 - 20)  SpO2: 97% (04-19-23 @ 04:47) (95% - 98%)                                               LABS:                        8.9    7.95  )-----------( 451      ( 18 Apr 2023 07:37 )             28.0     04-18    132<L>  |  97<L>  |  40<H>  ----------------------------<  105<H>  5.0   |  24  |  1.2    Ca    9.5      18 Apr 2023 07:37  Mg     1.7     04-18    TPro  6.2  /  Alb  2.8<L>  /  TBili  0.3  /  DBili  x   /  AST  12  /  ALT  13  /  AlkPhos  174<H>  04-17

## 2023-04-19 NOTE — PROGRESS NOTE ADULT - ATTENDING COMMENTS
89 year old female withPMHx of HTN, DM, CKD, active smoker, COPD not on home O2, recent admission for aspiration PNA with incidental finding of distal DVT presented to the ED for hypoxia. Patient is limited historian, history obtained from charts and by daughters at bedside. They report that patient has been in usual health since her discharge on 3/25 until this morning when she was noted to be short of breath and coughing more than usual. She has small amount of white phlegm. She was hypoxic to 85% on RA and was sent to the ED. Denies any fevers, chills, chest pain, abdominal pain, nausea, vomiting, diarrhea, constipation. Patient completed Vantin course on 3/30/23.      #Acute Hypoxic respiratory failure: multifactorial suspected PNA plus diastolic CHF/ MS plus COPD in active smoker , cont O2   - CT angio: no PE   sp antibiotics     clinically stable    # Dyspepsia/ Peptic Ulcer Disease/Hiatal hernia  # Constipation - resolved   # chronic dvt , not on ac, due to gib   # hyponatremia   Sodium, Serum: 133 mmol/L (04.19.23 @ 08:04)    #CKD3b stable     #HTN cont meds     #DM  CAPILLARY BLOOD GLUCOSE  POCT Blood Glucose.: 172 mg/dL (19 Apr 2023 11:38)  POCT Blood Glucose.: 132 mg/dL (19 Apr 2023 07:28)  POCT Blood Glucose.: 141 mg/dL (18 Apr 2023 20:53)  POCT Blood Glucose.: 130 mg/dL (18 Apr 2023 16:50)    cont current meds     #Progress Note Handoff  Pending (specify): hospice consult    Disposition: TBD 89 year old female withPMHx of HTN, DM, CKD, active smoker, COPD not on home O2, recent admission for aspiration PNA with incidental finding of distal DVT presented to the ED for hypoxia. Patient is limited historian, history obtained from charts and by daughters at bedside. They report that patient has been in usual health since her discharge on 3/25 until this morning when she was noted to be short of breath and coughing more than usual. She has small amount of white phlegm. She was hypoxic to 85% on RA and was sent to the ED. Denies any fevers, chills, chest pain, abdominal pain, nausea, vomiting, diarrhea, constipation. Patient completed Vantin course on 3/30/23.      #Acute Hypoxic respiratory failure: multifactorial suspected PNA plus diastolic CHF/ MS plus COPD in active smoker , cont O2   - CT angio: no PE   sp antibiotics     clinically stable    # Dyspepsia/ Peptic Ulcer Disease/Hiatal hernia  # Constipation - resolved   # chronic dvt , not on ac, due to gib   # hyponatremia   Sodium, Serum: 133 mmol/L (04.19.23 @ 08:04)    #CKD3b stable     #HTN cont meds     #DM  CAPILLARY BLOOD GLUCOSE  POCT Blood Glucose.: 172 mg/dL (19 Apr 2023 11:38)  POCT Blood Glucose.: 132 mg/dL (19 Apr 2023 07:28)  POCT Blood Glucose.: 141 mg/dL (18 Apr 2023 20:53)  POCT Blood Glucose.: 130 mg/dL (18 Apr 2023 16:50)    cont current meds     #Progress Note Handoff  Pending (specify): hospice consult    Disposition: sunrise   time spent 35 min

## 2023-04-24 DIAGNOSIS — E83.52 HYPERCALCEMIA: ICD-10-CM

## 2023-04-24 DIAGNOSIS — Z88.0 ALLERGY STATUS TO PENICILLIN: ICD-10-CM

## 2023-04-24 DIAGNOSIS — J69.0 PNEUMONITIS DUE TO INHALATION OF FOOD AND VOMIT: ICD-10-CM

## 2023-04-24 DIAGNOSIS — Z66 DO NOT RESUSCITATE: ICD-10-CM

## 2023-04-24 DIAGNOSIS — F17.210 NICOTINE DEPENDENCE, CIGARETTES, UNCOMPLICATED: ICD-10-CM

## 2023-04-24 DIAGNOSIS — D64.9 ANEMIA, UNSPECIFIED: ICD-10-CM

## 2023-04-24 DIAGNOSIS — E87.1 HYPO-OSMOLALITY AND HYPONATREMIA: ICD-10-CM

## 2023-04-24 DIAGNOSIS — I50.32 CHRONIC DIASTOLIC (CONGESTIVE) HEART FAILURE: ICD-10-CM

## 2023-04-24 DIAGNOSIS — J43.9 EMPHYSEMA, UNSPECIFIED: ICD-10-CM

## 2023-04-24 DIAGNOSIS — K27.7 CHRONIC PEPTIC ULCER, SITE UNSPECIFIED, WITHOUT HEMORRHAGE OR PERFORATION: ICD-10-CM

## 2023-04-24 DIAGNOSIS — I05.0 RHEUMATIC MITRAL STENOSIS: ICD-10-CM

## 2023-04-24 DIAGNOSIS — E83.42 HYPOMAGNESEMIA: ICD-10-CM

## 2023-04-24 DIAGNOSIS — K59.00 CONSTIPATION, UNSPECIFIED: ICD-10-CM

## 2023-04-24 DIAGNOSIS — M94.0 CHONDROCOSTAL JUNCTION SYNDROME [TIETZE]: ICD-10-CM

## 2023-04-24 DIAGNOSIS — K44.9 DIAPHRAGMATIC HERNIA WITHOUT OBSTRUCTION OR GANGRENE: ICD-10-CM

## 2023-04-24 DIAGNOSIS — E11.22 TYPE 2 DIABETES MELLITUS WITH DIABETIC CHRONIC KIDNEY DISEASE: ICD-10-CM

## 2023-04-24 DIAGNOSIS — I13.0 HYPERTENSIVE HEART AND CHRONIC KIDNEY DISEASE WITH HEART FAILURE AND STAGE 1 THROUGH STAGE 4 CHRONIC KIDNEY DISEASE, OR UNSPECIFIED CHRONIC KIDNEY DISEASE: ICD-10-CM

## 2023-04-24 DIAGNOSIS — J96.01 ACUTE RESPIRATORY FAILURE WITH HYPOXIA: ICD-10-CM

## 2023-04-24 DIAGNOSIS — E87.6 HYPOKALEMIA: ICD-10-CM

## 2023-04-24 DIAGNOSIS — I82.552 CHRONIC EMBOLISM AND THROMBOSIS OF LEFT PERONEAL VEIN: ICD-10-CM

## 2023-04-24 DIAGNOSIS — N18.32 CHRONIC KIDNEY DISEASE, STAGE 3B: ICD-10-CM

## 2023-04-24 DIAGNOSIS — J18.9 PNEUMONIA, UNSPECIFIED ORGANISM: ICD-10-CM

## 2023-04-24 DIAGNOSIS — I82.562 CHRONIC EMBOLISM AND THROMBOSIS OF LEFT CALF MUSCULAR VEIN: ICD-10-CM

## 2023-04-24 DIAGNOSIS — A41.9 SEPSIS, UNSPECIFIED ORGANISM: ICD-10-CM

## 2023-10-15 NOTE — PROGRESS NOTE ADULT - ASSESSMENT
IMPRESSION:    Sepsis present on admission, improving  Acute Hypoxemic Respiratory Failure improved on NC  Bilateral multilobar PNA / Possible aspiration, improving  COPD, not in acute exacerbation  Current smoker  Recent distal left gastrocnemius DVT  Hypomagnesemia, resolved  HO CKD      PLAN:    CNS: No depressants    HEENT: Oral care    PULMONARY: HOB @ 45 degrees.  Aspiration precautions.  Incentive spirometer.  Target POx 88 - 92%.  Wean O2 as tolerated.  Prednisone taper.  c/w home inhalers.  Duoneb q12h & q4h PRN.    CARDIOVASCULAR:  Target MAP > 65.  Keep I = O.  ECHO.    GASTROENTEROLOGY: GI prophylaxis.  Feeding as tolerated.  Bowel regimen if needed.    RENAL:  FU lytes.  Correct as necessary.  Monitor UO.    INFECTIOUS:  c/w Cefepime & Levofloxacin total 5-7 days.    Panculture.  FU cultures & sensitivities.    ENDOCRINOLOGY:  FU FS.  Insulin protocol if needed.     HEMATOLOGY:  Eliquis.  LE-VDUS reviewed unchanged from prior.    MUSCULOSKELETAL: OOB to chair, PT/OT    CODE STATUS: DNR DNI    Poor overall prognosis  DISPO: DGTF No IMPRESSION:    Sepsis present on admission  Acute Hypoxemic Respiratory Failure improved on NC  Bilateral multilobar PNA / Possible aspiration, improving  COPD, not in acute exacerbation  Current smoker  Recent distal left gastrocnemius DVT  Hypomagnesemia, resolved  Acute on chronic renal failure      PLAN:    CNS: No depressants    HEENT: Oral care    PULMONARY: HOB @ 45 degrees.  Aspiration precautions.  Incentive spirometer.  Target POx 88 - 92%.  Wean O2 as tolerated.  Predniso 20 for 5 days.  c/w home inhalers.  Duoneb q12h & q4h PRN.    CARDIOVASCULAR:  Target MAP > 65.  Keep I = O.  ECHO.    GASTROENTEROLOGY: GI prophylaxis.  Feeding as tolerated.  Bowel regimen if needed.    RENAL:  FU lytes.  Correct as necessary.  Monitor UO.    INFECTIOUS:  Finish course ofn abx    ENDOCRINOLOGY:  FU FS.  Insulin protocol if needed.     HEMATOLOGY:  Eliquis.  LE-VDUS reviewed unchanged from prior.    MUSCULOSKELETAL: OOB to chair, PT/OT    CODE STATUS: DNR DNI    Poor overall prognosis  DISPO: DGTF

## 2024-01-25 NOTE — ED PROVIDER NOTE - CHIEF COMPLAINT
Your rapid strep test was negative.  Someone will call you to prescribe antibiotics if the lab test is positive.  Take Tylenol/ibuprofen as needed for pain.  Make sure to stay hydrated.  
The patient is a 89y Female complaining of abdominal pain.